# Patient Record
Sex: MALE | Race: BLACK OR AFRICAN AMERICAN | NOT HISPANIC OR LATINO | URBAN - METROPOLITAN AREA
[De-identification: names, ages, dates, MRNs, and addresses within clinical notes are randomized per-mention and may not be internally consistent; named-entity substitution may affect disease eponyms.]

---

## 2014-08-14 RX ORDER — FAMOTIDINE 10 MG/ML
1 INJECTION INTRAVENOUS
Qty: 0 | Refills: 0 | DISCHARGE
Start: 2014-08-14

## 2015-05-05 RX ORDER — ASPIRIN/CALCIUM CARB/MAGNESIUM 324 MG
1 TABLET ORAL
Qty: 0 | Refills: 0 | DISCHARGE
Start: 2015-05-05

## 2017-03-08 ENCOUNTER — EMERGENCY (EMERGENCY)
Facility: HOSPITAL | Age: 72
LOS: 1 days | Discharge: HOME | End: 2017-03-08
Attending: EMERGENCY MEDICINE
Payer: COMMERCIAL

## 2017-03-08 VITALS
RESPIRATION RATE: 16 BRPM | SYSTOLIC BLOOD PRESSURE: 158 MMHG | TEMPERATURE: 98 F | HEIGHT: 76 IN | DIASTOLIC BLOOD PRESSURE: 71 MMHG | HEART RATE: 81 BPM | WEIGHT: 190.04 LBS | OXYGEN SATURATION: 99 %

## 2017-03-08 VITALS
RESPIRATION RATE: 18 BRPM | HEART RATE: 88 BPM | OXYGEN SATURATION: 98 % | DIASTOLIC BLOOD PRESSURE: 87 MMHG | TEMPERATURE: 98 F | SYSTOLIC BLOOD PRESSURE: 162 MMHG

## 2017-03-08 DIAGNOSIS — Z86.711 PERSONAL HISTORY OF PULMONARY EMBOLISM: ICD-10-CM

## 2017-03-08 DIAGNOSIS — J44.9 CHRONIC OBSTRUCTIVE PULMONARY DISEASE, UNSPECIFIED: ICD-10-CM

## 2017-03-08 DIAGNOSIS — N18.6 END STAGE RENAL DISEASE: ICD-10-CM

## 2017-03-08 DIAGNOSIS — K21.0 GASTRO-ESOPHAGEAL REFLUX DISEASE WITH ESOPHAGITIS: ICD-10-CM

## 2017-03-08 DIAGNOSIS — Z87.891 PERSONAL HISTORY OF NICOTINE DEPENDENCE: ICD-10-CM

## 2017-03-08 DIAGNOSIS — E78.5 HYPERLIPIDEMIA, UNSPECIFIED: ICD-10-CM

## 2017-03-08 DIAGNOSIS — I12.0 HYPERTENSIVE CHRONIC KIDNEY DISEASE WITH STAGE 5 CHRONIC KIDNEY DISEASE OR END STAGE RENAL DISEASE: ICD-10-CM

## 2017-03-08 LAB
ALBUMIN SERPL ELPH-MCNC: 3.5 G/DL — SIGNIFICANT CHANGE UP (ref 3.5–5)
ALP SERPL-CCNC: 85 U/L — SIGNIFICANT CHANGE UP (ref 40–120)
ALT FLD-CCNC: 21 U/L DA — SIGNIFICANT CHANGE UP (ref 10–60)
ANION GAP SERPL CALC-SCNC: 7 MMOL/L — SIGNIFICANT CHANGE UP (ref 5–17)
AST SERPL-CCNC: 29 U/L — SIGNIFICANT CHANGE UP (ref 10–40)
BASOPHILS # BLD AUTO: 0 K/UL — SIGNIFICANT CHANGE UP (ref 0–0.2)
BASOPHILS NFR BLD AUTO: 1 % — SIGNIFICANT CHANGE UP (ref 0–2)
BILIRUB SERPL-MCNC: 0.4 MG/DL — SIGNIFICANT CHANGE UP (ref 0.2–1.2)
BUN SERPL-MCNC: 22 MG/DL — HIGH (ref 7–18)
CALCIUM SERPL-MCNC: 8.9 MG/DL — SIGNIFICANT CHANGE UP (ref 8.4–10.5)
CHLORIDE SERPL-SCNC: 101 MMOL/L — SIGNIFICANT CHANGE UP (ref 96–108)
CO2 SERPL-SCNC: 33 MMOL/L — HIGH (ref 22–31)
CREAT SERPL-MCNC: 9.49 MG/DL — HIGH (ref 0.5–1.3)
EOSINOPHIL # BLD AUTO: 0.2 K/UL — SIGNIFICANT CHANGE UP (ref 0–0.5)
EOSINOPHIL NFR BLD AUTO: 3.5 % — SIGNIFICANT CHANGE UP (ref 0–6)
GLUCOSE SERPL-MCNC: 73 MG/DL — SIGNIFICANT CHANGE UP (ref 70–99)
HCT VFR BLD CALC: 35 % — LOW (ref 39–50)
HGB BLD-MCNC: 10.7 G/DL — LOW (ref 13–17)
LIDOCAIN IGE QN: 85 U/L — SIGNIFICANT CHANGE UP (ref 73–393)
LYMPHOCYTES # BLD AUTO: 1.4 K/UL — SIGNIFICANT CHANGE UP (ref 1–3.3)
LYMPHOCYTES # BLD AUTO: 27.8 % — SIGNIFICANT CHANGE UP (ref 13–44)
MCHC RBC-ENTMCNC: 25 PG — LOW (ref 27–34)
MCHC RBC-ENTMCNC: 30.6 GM/DL — LOW (ref 32–36)
MCV RBC AUTO: 81.8 FL — SIGNIFICANT CHANGE UP (ref 80–100)
MONOCYTES # BLD AUTO: 0.8 K/UL — SIGNIFICANT CHANGE UP (ref 0–0.9)
MONOCYTES NFR BLD AUTO: 16.6 % — HIGH (ref 2–14)
NEUTROPHILS # BLD AUTO: 2.5 K/UL — SIGNIFICANT CHANGE UP (ref 1.8–7.4)
NEUTROPHILS NFR BLD AUTO: 51.2 % — SIGNIFICANT CHANGE UP (ref 43–77)
PLATELET # BLD AUTO: 138 K/UL — LOW (ref 150–400)
POTASSIUM SERPL-MCNC: 4.1 MMOL/L — SIGNIFICANT CHANGE UP (ref 3.5–5.3)
POTASSIUM SERPL-SCNC: 4.1 MMOL/L — SIGNIFICANT CHANGE UP (ref 3.5–5.3)
PROT SERPL-MCNC: 7.9 G/DL — SIGNIFICANT CHANGE UP (ref 6–8.3)
RBC # BLD: 4.27 M/UL — SIGNIFICANT CHANGE UP (ref 4.2–5.8)
RBC # FLD: 16.5 % — HIGH (ref 10.3–14.5)
SODIUM SERPL-SCNC: 141 MMOL/L — SIGNIFICANT CHANGE UP (ref 135–145)
WBC # BLD: 4.9 K/UL — SIGNIFICANT CHANGE UP (ref 3.8–10.5)
WBC # FLD AUTO: 4.9 K/UL — SIGNIFICANT CHANGE UP (ref 3.8–10.5)

## 2017-03-08 PROCEDURE — 83690 ASSAY OF LIPASE: CPT

## 2017-03-08 PROCEDURE — 99284 EMERGENCY DEPT VISIT MOD MDM: CPT

## 2017-03-08 PROCEDURE — 74176 CT ABD & PELVIS W/O CONTRAST: CPT

## 2017-03-08 PROCEDURE — 80053 COMPREHEN METABOLIC PANEL: CPT

## 2017-03-08 PROCEDURE — 74176 CT ABD & PELVIS W/O CONTRAST: CPT | Mod: 26

## 2017-03-08 PROCEDURE — 85027 COMPLETE CBC AUTOMATED: CPT

## 2017-03-08 PROCEDURE — 36000 PLACE NEEDLE IN VEIN: CPT

## 2017-03-08 RX ORDER — SODIUM CHLORIDE 9 MG/ML
1000 INJECTION INTRAMUSCULAR; INTRAVENOUS; SUBCUTANEOUS ONCE
Qty: 0 | Refills: 0 | Status: COMPLETED | OUTPATIENT
Start: 2017-03-08 | End: 2017-03-08

## 2017-03-08 RX ADMIN — SODIUM CHLORIDE 1000 MILLILITER(S): 9 INJECTION INTRAMUSCULAR; INTRAVENOUS; SUBCUTANEOUS at 14:52

## 2017-03-08 NOTE — ED ADULT NURSE REASSESSMENT NOTE - NS ED NURSE REASSESS COMMENT FT1
Pt. is a&ox3, stable, and in no acute distress. Pt. is awaiting discharge. Pt. offers no complaints. Pt. is interactive.

## 2017-03-08 NOTE — ED PROVIDER NOTE - ATTENDING CONTRIBUTION TO CARE
I, Sarah Padilla, performed the initial face to face bedside interview with this patient regarding history of present illness, review of symptoms and relevant past medical, social and family history.  I completed an independent physical examination.  I was the initial provider who evaluated this patient. I have signed out the follow up of any pending tests (i.e. labs, radiological studies) to the ACP.  I have communicated the patient’s plan of care and disposition with the ACP.  The history, relevant review of systems, past medical and surgical history, medical decision making, and physical examination was documented by the scribe in my presence and I attest to the accuracy of the documentation.

## 2017-03-08 NOTE — ED PROVIDER NOTE - PMH
Aortic valve disorder    Arterial insufficiency of lower extremity  RLE  AV fistula infection, sequela    BPH (benign prostatic hyperplasia)    COPD (chronic obstructive pulmonary disease)    Diverticulitis of colon    Diverticulitis of colon    Dizziness    Emphysema/COPD    ESRD (end stage renal disease)    History of smoking 2 pac for 25 yrs , quit 7 yrs ago    HLD (hyperlipidemia)    HTN (hypertension)    PE (pulmonary embolism)    Tobacco abuse  Quit in 2005, Smoked 2.5 PPD for 15 years

## 2017-03-08 NOTE — ED ADULT NURSE NOTE - OBJECTIVE STATEMENT
pt a&ox3, here with c/o abdominal pain. pt states epigastric pain when eating with vomiting. denies nausea, fever, chills, diarrhea, constipation. epigastric pain with vomiting x2 weeks only after eating. pt a&ox3, here with c/o abdominal pain. pt states epigastric pain when eating with vomiting. denies nausea, fever, chills, diarrhea, constipation. epigastric pain with vomiting x2 weeks only after eating. pt with right upper arm dialysis shunt, dialysis on tues/thurs/sat.

## 2017-03-08 NOTE — ED PROVIDER NOTE - OBJECTIVE STATEMENT
70 y/o M pt w/ PMHx of COPD, BPH, diverticulitis and ESRD presents to the ED c/o vomiting and diarrhea x2 weeks. Pt states that he has lost 15 pounds because he keeps on vomiting and having diarrhea. Pt denies fever, chills or any other complaints. NKDA.

## 2017-05-15 ENCOUNTER — RESULT REVIEW (OUTPATIENT)
Age: 72
End: 2017-05-15

## 2017-05-16 ENCOUNTER — EMERGENCY (EMERGENCY)
Facility: HOSPITAL | Age: 72
LOS: 1 days | Discharge: HOME | End: 2017-05-16
Attending: EMERGENCY MEDICINE
Payer: COMMERCIAL

## 2017-05-16 VITALS
DIASTOLIC BLOOD PRESSURE: 66 MMHG | OXYGEN SATURATION: 97 % | HEART RATE: 88 BPM | SYSTOLIC BLOOD PRESSURE: 139 MMHG | RESPIRATION RATE: 18 BRPM | HEIGHT: 76 IN | WEIGHT: 190.04 LBS | TEMPERATURE: 99 F

## 2017-05-16 VITALS
RESPIRATION RATE: 17 BRPM | SYSTOLIC BLOOD PRESSURE: 170 MMHG | HEART RATE: 87 BPM | DIASTOLIC BLOOD PRESSURE: 67 MMHG | TEMPERATURE: 98 F | OXYGEN SATURATION: 98 %

## 2017-05-16 DIAGNOSIS — I12.0 HYPERTENSIVE CHRONIC KIDNEY DISEASE WITH STAGE 5 CHRONIC KIDNEY DISEASE OR END STAGE RENAL DISEASE: ICD-10-CM

## 2017-05-16 DIAGNOSIS — N18.6 END STAGE RENAL DISEASE: ICD-10-CM

## 2017-05-16 DIAGNOSIS — R31.9 HEMATURIA, UNSPECIFIED: ICD-10-CM

## 2017-05-16 DIAGNOSIS — R42 DIZZINESS AND GIDDINESS: ICD-10-CM

## 2017-05-16 DIAGNOSIS — Z99.2 DEPENDENCE ON RENAL DIALYSIS: ICD-10-CM

## 2017-05-16 DIAGNOSIS — E78.5 HYPERLIPIDEMIA, UNSPECIFIED: ICD-10-CM

## 2017-05-16 LAB
ALBUMIN SERPL ELPH-MCNC: 3.6 G/DL — SIGNIFICANT CHANGE UP (ref 3.5–5)
ALP SERPL-CCNC: 99 U/L — SIGNIFICANT CHANGE UP (ref 40–120)
ALT FLD-CCNC: 24 U/L DA — SIGNIFICANT CHANGE UP (ref 10–60)
ANION GAP SERPL CALC-SCNC: 7 MMOL/L — SIGNIFICANT CHANGE UP (ref 5–17)
APPEARANCE UR: ABNORMAL
APTT BLD: 31.1 SEC — SIGNIFICANT CHANGE UP (ref 27.5–37.4)
AST SERPL-CCNC: 22 U/L — SIGNIFICANT CHANGE UP (ref 10–40)
BASOPHILS # BLD AUTO: 0.1 K/UL — SIGNIFICANT CHANGE UP (ref 0–0.2)
BASOPHILS NFR BLD AUTO: 1.3 % — SIGNIFICANT CHANGE UP (ref 0–2)
BILIRUB SERPL-MCNC: 0.5 MG/DL — SIGNIFICANT CHANGE UP (ref 0.2–1.2)
BILIRUB UR-MCNC: NEGATIVE — SIGNIFICANT CHANGE UP
BUN SERPL-MCNC: 33 MG/DL — HIGH (ref 7–18)
CALCIUM SERPL-MCNC: 9.6 MG/DL — SIGNIFICANT CHANGE UP (ref 8.4–10.5)
CHLORIDE SERPL-SCNC: 99 MMOL/L — SIGNIFICANT CHANGE UP (ref 96–108)
CO2 SERPL-SCNC: 33 MMOL/L — HIGH (ref 22–31)
COLOR SPEC: ABNORMAL
CREAT SERPL-MCNC: 7.62 MG/DL — HIGH (ref 0.5–1.3)
DIFF PNL FLD: ABNORMAL
EOSINOPHIL # BLD AUTO: 0.3 K/UL — SIGNIFICANT CHANGE UP (ref 0–0.5)
EOSINOPHIL NFR BLD AUTO: 4.7 % — SIGNIFICANT CHANGE UP (ref 0–6)
GLUCOSE SERPL-MCNC: 86 MG/DL — SIGNIFICANT CHANGE UP (ref 70–99)
GLUCOSE UR QL: NEGATIVE — SIGNIFICANT CHANGE UP
HCT VFR BLD CALC: 37.3 % — LOW (ref 39–50)
HGB BLD-MCNC: 11.4 G/DL — LOW (ref 13–17)
INR BLD: 1.01 RATIO — SIGNIFICANT CHANGE UP (ref 0.88–1.16)
KETONES UR-MCNC: NEGATIVE — SIGNIFICANT CHANGE UP
LEUKOCYTE ESTERASE UR-ACNC: NEGATIVE — SIGNIFICANT CHANGE UP
LYMPHOCYTES # BLD AUTO: 1.7 K/UL — SIGNIFICANT CHANGE UP (ref 1–3.3)
LYMPHOCYTES # BLD AUTO: 28.2 % — SIGNIFICANT CHANGE UP (ref 13–44)
MCHC RBC-ENTMCNC: 25.2 PG — LOW (ref 27–34)
MCHC RBC-ENTMCNC: 30.6 GM/DL — LOW (ref 32–36)
MCV RBC AUTO: 82.5 FL — SIGNIFICANT CHANGE UP (ref 80–100)
MONOCYTES # BLD AUTO: 1 K/UL — HIGH (ref 0–0.9)
MONOCYTES NFR BLD AUTO: 16.6 % — HIGH (ref 2–14)
NEUTROPHILS # BLD AUTO: 3 K/UL — SIGNIFICANT CHANGE UP (ref 1.8–7.4)
NEUTROPHILS NFR BLD AUTO: 49.2 % — SIGNIFICANT CHANGE UP (ref 43–77)
NITRITE UR-MCNC: NEGATIVE — SIGNIFICANT CHANGE UP
PH UR: 8 — SIGNIFICANT CHANGE UP (ref 5–8)
PLATELET # BLD AUTO: 98 K/UL — LOW (ref 150–400)
POTASSIUM SERPL-MCNC: 4.4 MMOL/L — SIGNIFICANT CHANGE UP (ref 3.5–5.3)
POTASSIUM SERPL-SCNC: 4.4 MMOL/L — SIGNIFICANT CHANGE UP (ref 3.5–5.3)
PROT SERPL-MCNC: 8.3 G/DL — SIGNIFICANT CHANGE UP (ref 6–8.3)
PROT UR-MCNC: 100
PROTHROM AB SERPL-ACNC: 11 SEC — SIGNIFICANT CHANGE UP (ref 9.8–12.7)
RBC # BLD: 4.52 M/UL — SIGNIFICANT CHANGE UP (ref 4.2–5.8)
RBC # FLD: 15.8 % — HIGH (ref 10.3–14.5)
SODIUM SERPL-SCNC: 139 MMOL/L — SIGNIFICANT CHANGE UP (ref 135–145)
SP GR SPEC: 1.01 — SIGNIFICANT CHANGE UP (ref 1.01–1.02)
UROBILINOGEN FLD QL: NEGATIVE — SIGNIFICANT CHANGE UP
WBC # BLD: 6 K/UL — SIGNIFICANT CHANGE UP (ref 3.8–10.5)
WBC # FLD AUTO: 6 K/UL — SIGNIFICANT CHANGE UP (ref 3.8–10.5)

## 2017-05-16 PROCEDURE — 86901 BLOOD TYPING SEROLOGIC RH(D): CPT

## 2017-05-16 PROCEDURE — 51703 INSERT BLADDER CATH COMPLEX: CPT

## 2017-05-16 PROCEDURE — 86900 BLOOD TYPING SEROLOGIC ABO: CPT

## 2017-05-16 PROCEDURE — 86850 RBC ANTIBODY SCREEN: CPT

## 2017-05-16 PROCEDURE — 85610 PROTHROMBIN TIME: CPT

## 2017-05-16 PROCEDURE — 36000 PLACE NEEDLE IN VEIN: CPT

## 2017-05-16 PROCEDURE — 87086 URINE CULTURE/COLONY COUNT: CPT

## 2017-05-16 PROCEDURE — 81001 URINALYSIS AUTO W/SCOPE: CPT

## 2017-05-16 PROCEDURE — 80053 COMPREHEN METABOLIC PANEL: CPT

## 2017-05-16 PROCEDURE — 99284 EMERGENCY DEPT VISIT MOD MDM: CPT | Mod: 25

## 2017-05-16 PROCEDURE — 85027 COMPLETE CBC AUTOMATED: CPT

## 2017-05-16 PROCEDURE — 85730 THROMBOPLASTIN TIME PARTIAL: CPT

## 2017-05-16 NOTE — ED PROVIDER NOTE - PHYSICAL EXAMINATION
PE: CONSTITUTIONAL: Well appearing, well nourished, in no apparent distress. ENMT: Airway patent, nasal mucosa clear, mouth with normal mucosa. HEAD: NCAT EYES: PERRL, EOMI CARDIAC: RRR, no m/r/g, no pedal edema RESPIRATORY: CTA b/l, no adventitious sounds GI: Abdomen non-distended, non-tender MSK: Spine appears normal, range of motion is not limited, no muscle/joint tenderness NEURO: CNII-XII grossly intact, 5/5 strength, full sensation all extremities, gait intact SKIN: No rash, R AV fistula intact

## 2017-05-16 NOTE — ED PROVIDER NOTE - OBJECTIVE STATEMENT
72m pmhx ESRD on HD tues/thurs/sat (still makes urine), BPH, COPD, HLD, HTN, PE (not on A/C per pt) p/w hematuria. started monday, was seen by PMD who rx'd Abx. pt was able to started first 2 doses today, states voiding more blood now, denies clots or retention. has never occurred in past, not on any A/C. a/w lightheadedness, denies fever/chills, CP/SOB, abdo pain, nausea/vomiting.

## 2017-05-16 NOTE — ED PROVIDER NOTE - PROGRESS NOTE DETAILS
s/p wheatley clamp for ~25-30 min, urine still clear. hgb stable, will d/c home. place name in [facility] for urology f/up.

## 2017-05-16 NOTE — ED ADULT TRIAGE NOTE - CHIEF COMPLAINT QUOTE
BIBA c/o blood in urine x 3 days, pt states he was prescribed cipro for uti even though its not confirmed yet by UA, pt is dialysis pt, had dialysis today

## 2017-05-16 NOTE — ED PROVIDER NOTE - NS ED ROS FT
ROS: GENERAL: no fevers, no chills HEENT: no epistaxis, no eye pain, no ear, no throat pain CARDIAC: no chest pain, no shortness of breath PULM: no cough, no shortness of breath GI: no nausea, no vomiting, no diarrhea,  no abdominal pain, no hematemesis, no bright red blood per rectum : no dysuria, no hematuria EXTREMITIES: no arm pain, no leg pain, no back pain SKIN: no purpura, no petechiae NEURO: no headache, no neck pain, no loss of strength/sensation, +lightheaded HEME: no easy bruising, no easy bleeding

## 2017-05-16 NOTE — ED ADULT NURSE NOTE - OBJECTIVE STATEMENT
pt. came in via ems with c/o blood in the urine pt states that he has blood in his urine x 3 days alreadypt states he is a dialysis pt denies any pain at this time last fialysis was this am

## 2017-05-16 NOTE — ED PROVIDER NOTE - MEDICAL DECISION MAKING DETAILS
72m pmhx ESRD on HD tues/thurs/sat (still makes urine), BPH, COPD, HLD, HTN, PE (not on A/C per pt) p/w hematuria. started monday, was seen by PMD who rx'd Abx. pt was able to started first 2 doses today, states voiding more blood now, denies clots or retention. has never occurred in past, not on any A/C. a/w lightheadedness, denies fever/chills, CP/SOB, abdo pain, nausea/vomiting. on PE, VSS, in no distress, RRR, CTA b/l, abdo soft, non-TTP. will obtain basics, T&S, UA/UCx

## 2017-05-17 ENCOUNTER — EMERGENCY (EMERGENCY)
Facility: HOSPITAL | Age: 72
LOS: 1 days | Discharge: HOME | End: 2017-05-17
Attending: EMERGENCY MEDICINE
Payer: COMMERCIAL

## 2017-05-17 VITALS
DIASTOLIC BLOOD PRESSURE: 59 MMHG | HEART RATE: 94 BPM | TEMPERATURE: 99 F | RESPIRATION RATE: 18 BRPM | OXYGEN SATURATION: 98 % | SYSTOLIC BLOOD PRESSURE: 193 MMHG

## 2017-05-17 VITALS
OXYGEN SATURATION: 98 % | HEART RATE: 106 BPM | DIASTOLIC BLOOD PRESSURE: 68 MMHG | WEIGHT: 190.04 LBS | SYSTOLIC BLOOD PRESSURE: 155 MMHG | HEIGHT: 76 IN | RESPIRATION RATE: 18 BRPM | TEMPERATURE: 98 F

## 2017-05-17 DIAGNOSIS — R31.9 HEMATURIA, UNSPECIFIED: ICD-10-CM

## 2017-05-17 DIAGNOSIS — I12.0 HYPERTENSIVE CHRONIC KIDNEY DISEASE WITH STAGE 5 CHRONIC KIDNEY DISEASE OR END STAGE RENAL DISEASE: ICD-10-CM

## 2017-05-17 DIAGNOSIS — Z98.890 OTHER SPECIFIED POSTPROCEDURAL STATES: ICD-10-CM

## 2017-05-17 DIAGNOSIS — N18.6 END STAGE RENAL DISEASE: ICD-10-CM

## 2017-05-17 DIAGNOSIS — J44.9 CHRONIC OBSTRUCTIVE PULMONARY DISEASE, UNSPECIFIED: ICD-10-CM

## 2017-05-17 DIAGNOSIS — E78.5 HYPERLIPIDEMIA, UNSPECIFIED: ICD-10-CM

## 2017-05-17 DIAGNOSIS — Z87.891 PERSONAL HISTORY OF NICOTINE DEPENDENCE: ICD-10-CM

## 2017-05-17 DIAGNOSIS — N40.0 BENIGN PROSTATIC HYPERPLASIA WITHOUT LOWER URINARY TRACT SYMPTOMS: ICD-10-CM

## 2017-05-17 LAB
HCT VFR BLD CALC: 38.5 % — LOW (ref 39–50)
HGB BLD-MCNC: 11.7 G/DL — LOW (ref 13–17)
MCHC RBC-ENTMCNC: 25.3 PG — LOW (ref 27–34)
MCHC RBC-ENTMCNC: 30.4 GM/DL — LOW (ref 32–36)
MCV RBC AUTO: 83.2 FL — SIGNIFICANT CHANGE UP (ref 80–100)
PLATELET # BLD AUTO: 118 K/UL — LOW (ref 150–400)
RBC # BLD: 4.63 M/UL — SIGNIFICANT CHANGE UP (ref 4.2–5.8)
RBC # FLD: 15.7 % — HIGH (ref 10.3–14.5)
WBC # BLD: 8.6 K/UL — SIGNIFICANT CHANGE UP (ref 3.8–10.5)
WBC # FLD AUTO: 8.6 K/UL — SIGNIFICANT CHANGE UP (ref 3.8–10.5)

## 2017-05-17 PROCEDURE — 99284 EMERGENCY DEPT VISIT MOD MDM: CPT | Mod: 25

## 2017-05-17 PROCEDURE — 72192 CT PELVIS W/O DYE: CPT | Mod: 26

## 2017-05-17 PROCEDURE — 85027 COMPLETE CBC AUTOMATED: CPT

## 2017-05-17 PROCEDURE — 72192 CT PELVIS W/O DYE: CPT

## 2017-05-17 PROCEDURE — 99285 EMERGENCY DEPT VISIT HI MDM: CPT | Mod: 25

## 2017-05-17 PROCEDURE — 96374 THER/PROPH/DIAG INJ IV PUSH: CPT

## 2017-05-17 RX ORDER — MORPHINE SULFATE 50 MG/1
4 CAPSULE, EXTENDED RELEASE ORAL ONCE
Qty: 0 | Refills: 0 | Status: DISCONTINUED | OUTPATIENT
Start: 2017-05-17 | End: 2017-05-17

## 2017-05-17 RX ORDER — HYDRALAZINE HCL 50 MG
25 TABLET ORAL ONCE
Qty: 0 | Refills: 0 | Status: COMPLETED | OUTPATIENT
Start: 2017-05-17 | End: 2017-05-17

## 2017-05-17 RX ADMIN — Medication 25 MILLIGRAM(S): at 16:14

## 2017-05-17 RX ADMIN — MORPHINE SULFATE 4 MILLIGRAM(S): 50 CAPSULE, EXTENDED RELEASE ORAL at 16:10

## 2017-05-17 RX ADMIN — MORPHINE SULFATE 4 MILLIGRAM(S): 50 CAPSULE, EXTENDED RELEASE ORAL at 12:16

## 2017-05-17 NOTE — ED PROVIDER NOTE - DATA REVIEWED, MDM
old records/nurses notes/vital signs
no redness or swelling noted; catheter intact/IV discontinued, cath removed intact

## 2017-05-17 NOTE — ED PROVIDER NOTE - PHYSICAL EXAMINATION
GENERAL: No acute distress, non toxic  HEAD: Atraumatic, normocephalic  EARS: Externally normal, atraumatic, TMs normal bilaterally  EYES: No jaundice, not injected, no rupture, no foreign bodies  MOUTH: Moist mucous membranes, no open lesion, uvula midline without edema, no exudates, no peritonsilar abscess bilaterally.  NECK: Supple, full range of motion, no swelling, no lymphadenopathy  HEART: Regular rate and rhythm, no murmurs, no rubs, no gallops  LUNGS: Clear to auscultation bilaterally without rhonci, rales, or wheezing  ABDOMEN: Soft and non tender in all 4 quadrants, no signs of trauma, no costovertebral tenderness bilaterally, no suprapubic masses  : Marc in place, draining blood in leg bag, no clots  BACK/SPINE: Non tender spine in cervical/thoracic/lumbar regions, no stepoffs palpable  EXTREMITIES: No gross deformities  VASCULAR: Pulses palpable in all extremities, no pitting edema, capillary refill <2 secs  SKIN: Grossly intact without rash or open wounds  PSYCH: Alert and oriented x 3  GAIT: Normal without need for assistance

## 2017-05-17 NOTE — CONSULT NOTE ADULT - SUBJECTIVE AND OBJECTIVE BOX
This is a 71 y/o male with PMHx of ESRD on HD (Tu-Th-Sat), s/p Kidney transplant, BPH, COPD, HLD, HTN & hx of a PE (not on AC) who presented to the ED for hematuria. He was here last night with hematuria that began 2-3 days ago. Today he began to have pain because of the hematuria. The patient normally doesn’t make urine, he states that if he makes any it is a very small amount. SO when he saw blood coming from his penis that was painful he came to the ED. At first a wheatley & CBI was placed and his urine cleared up and he was sent home with the wheatley. However, a few hours later the patient returned to the ED with painful hematuria again so CBI was restarted and the urine cleared up. He remained in the ED most of the day for observation, a CT pelvis was obtained, as was a repeat CBC to check the H&H. Around 1230pm the CBI stopped flowing and he needed to be irrigated once, one large clot was removed and CBI was restarted. It has remained clear since. The CBI was clamped. 1.5 hours later he was rechecked and there is no hematuria, and patient has no pain. Currently the patient is comfortable, he is complaining of some penile pain, denies CP, SOB, fevers, chills, nausea, and vomiting.     PMHx: as above  SurgHx: Colon resection for diverticulitis, renal trasnplant  Social Hx: 15-20 year smoking history of 2.5 packs per day, had not smoked for 10 years  Allergies: No Known Allergies    Vitals: T(F): 98, Max: 98 (05-17 @ 15:17)  HR: 103 (92 - 106)  BP: 202/90 (155/68 - 202/90)  RR: 16 (16 - 18)  SpO2: 99% (98% - 100%)  Wt(kg): --    Labs:                       11.7   8.6   )-----------( 118      ( 17 May 2017 09:56 )             38.5 (37.3)      139  |  99  |  33<H>  ----------------------------<  86  4.4   |  33<H>  |  7.62<H>    Ca    9.6      16 May 2017 21:16    TPro  8.3  /  Alb  3.6  /  TBili  0.5  /  DBili  x   /  AST  22  /  ALT  24  /  AlkPhos  99     CT: No contrast is noted in the blood vessels or collecting system   due to contrast extravasation.  Interval placement of a Wheatley catheter   which terminates in the urinary bladder. Evaluation of the urinary   bladder is limited by lack of excreted IV contrast and due to   underdistention. The prostateis enlarged measuring 6.2 x 7.4 x 7.5 cm.   Stable right lower quadrant transplant kidney. No evidence for a calculus   in the transplant kidney or ureter. Reflux of air in the transplant renal   pelvis and ureter. Mild pelviectasis of the transplantkidney. In the   renal pelvis of the transplant kidney, there is a dependent 1.4 x 0.7 cm   slightly hyperdense structure; this may represent a blood clot or a   mass/cancer.    Physical Exam  General: AAO x 3, no acute distress  Abdomen: soft, nontender, nondistended, no rebound, no guarding, no tympani, mild suprapubic tenderness  Back:   :  Wheatley: This is a 73 y/o male with PMHx of ESRD on HD (Tu-Th-Sat), s/p Kidney transplant, BPH, COPD, HLD, HTN & hx of a PE (not on AC) who presented to the ED for hematuria. He was here last night with hematuria that began 2-3 days ago. Today he began to have pain because of the hematuria. The patient normally doesn’t make urine, he states that if he makes any it is a very small amount. SO when he saw blood coming from his penis that was painful he came to the ED. At first a wheatley & CBI was placed and his urine cleared up and he was sent home with the wheatley. However, a few hours later the patient returned to the ED with painful hematuria again so CBI was restarted and the urine cleared up. He remained in the ED most of the day for observation, a CT pelvis was obtained, as was a repeat CBC to check the H&H. Around 1230pm the CBI stopped flowing and he needed to be irrigated once, one large clot was removed and CBI was restarted. It has remained clear since. The CBI was clamped. 1.5 hours later he was rechecked and there is no hematuria, and patient has no pain. Currently the patient is comfortable, he is complaining of some penile pain, denies CP, SOB, fevers, chills, nausea, and vomiting.     PMHx: as above  SurgHx: Colon resection for diverticulitis, renal trasnplant  Social Hx: 15-20 year smoking history of 2.5 packs per day, had not smoked for 10 years  Allergies: No Known Allergies    Vitals: T(F): 98, Max: 98 (05-17 @ 15:17)  HR: 103 (92 - 106)  BP: 202/90 (155/68 - 202/90)  RR: 16 (16 - 18)  SpO2: 99% (98% - 100%)  Wt(kg): --    Labs:                       11.7   8.6   )-----------( 118      ( 17 May 2017 09:56 )             38.5 (37.3)      139  |  99  |  33<H>  ----------------------------<  86  4.4   |  33<H>  |  7.62<H>    Ca    9.6      16 May 2017 21:16    TPro  8.3  /  Alb  3.6  /  TBili  0.5  /  DBili  x   /  AST  22  /  ALT  24  /  AlkPhos  99     CT: No contrast is noted in the blood vessels or collecting system   due to contrast extravasation.  Interval placement of a Wheatley catheter   which terminates in the urinary bladder. Evaluation of the urinary   bladder is limited by lack of excreted IV contrast and due to   underdistention. The prostateis enlarged measuring 6.2 x 7.4 x 7.5 cm.   Stable right lower quadrant transplant kidney. No evidence for a calculus   in the transplant kidney or ureter. Reflux of air in the transplant renal   pelvis and ureter. Mild pelviectasis of the transplantkidney. In the   renal pelvis of the transplant kidney, there is a dependent 1.4 x 0.7 cm   slightly hyperdense structure; this may represent a blood clot or a   mass/cancer.    Physical Exam  General: AAO x 3, no acute distress  Abdomen: soft, nontender, nondistended, no rebound, no guarding, no tympani, mild suprapubic tenderness  Back: no CVA tenderness bilaterally  : normal external genitalia, no penile or scrotal edema, both testicles are non tender, penis is tender but likely due to placement of wheatley catheter.  Wheatley: in place, CBI is clamped and wheatley is clear

## 2017-05-17 NOTE — CONSULT NOTE ADULT - ATTENDING COMMENTS
urine now clear   will d/c home and arrange for cystoscopy in office next week  return for continued hematuria

## 2017-05-17 NOTE — ED PROVIDER NOTE - OBJECTIVE STATEMENT
72m pmhx ESRD on HD T/Thu/Sat (still makes urine), BPH, COPD, HLD, HTN, PE (not on A/C per pt) p/w hematuria. started monday, was seen by PMD who rx'd Abx. pt was able to started first 2 doses today, states voiding more blood now, denies clots or retention. Seen earlier tonight with same symptoms but cleared with CBI and no pain. Sent home and now returns as having pain in suprapubic area and blood in wheatley. Not on AC. No trauma, could not sleep so came to ED for treatment. Still noticed draining of blood in urine and wheatley. No chest pain, palpitations, dizziness, paresthesias.

## 2017-05-17 NOTE — ED PROVIDER NOTE - MEDICAL DECISION MAKING DETAILS
71 yo M with multiple medical problems that was recently discharged from [facility] ED for hematuria and placed Marc and leg bag returns with hematuria and pain. Labs reviewed from recent ED visit and H/H stable and CBI showed urine cleared with CBI. Will CBI again and provide pain meds and reassess.

## 2017-05-17 NOTE — CONSULT NOTE ADULT - PROBLEM SELECTOR RECOMMENDATION 9
1. d/c wheatley catheter  2. d/c home  3. Patient will follow up with Dr. Lamas in the office as an outpatient.  4. Instructed to return to the ED if hematuria continues

## 2017-05-17 NOTE — ED PROVIDER NOTE - PSH
h/o  LUE A-V fistula    h/o AV fistula - left upper arm    H/O unilateral nephrectomy  9/2013  History of colon resection - April 2012    S/P kidney transplant  09/2013 in [place]

## 2017-05-17 NOTE — ED ADULT TRIAGE NOTE - CHIEF COMPLAINT QUOTE
pain catheter wheatley cath site,blood in the urine,was discharged last night in ed with wheatley catheter,dialysis pt last dialysis yesterday

## 2017-05-17 NOTE — ED ADULT NURSE NOTE - OBJECTIVE STATEMENT
Patient presents with wheatley catheter to leg bag. Bloody urine since yesterday. Last HD yesterday.

## 2017-05-17 NOTE — ED ADULT NURSE REASSESSMENT NOTE - NS ED NURSE REASSESS COMMENT FT1
1204 - pt hematuria resolved with bladder irrigation , pt tolerated procedure well denies any pain now. clear output noted pt stable no distress dc and cleared home by PMD. pt will go home by ambulette with v/s stable

## 2017-05-18 LAB
CULTURE RESULTS: SIGNIFICANT CHANGE UP
SPECIMEN SOURCE: SIGNIFICANT CHANGE UP

## 2017-05-24 ENCOUNTER — RESULT REVIEW (OUTPATIENT)
Age: 72
End: 2017-05-24

## 2017-07-26 ENCOUNTER — OUTPATIENT (OUTPATIENT)
Dept: OUTPATIENT SERVICES | Facility: HOSPITAL | Age: 72
LOS: 1 days | End: 2017-07-26
Payer: COMMERCIAL

## 2017-07-26 VITALS
HEART RATE: 76 BPM | OXYGEN SATURATION: 98 % | TEMPERATURE: 98 F | DIASTOLIC BLOOD PRESSURE: 68 MMHG | WEIGHT: 179.9 LBS | SYSTOLIC BLOOD PRESSURE: 151 MMHG | HEIGHT: 76 IN | RESPIRATION RATE: 16 BRPM

## 2017-07-26 DIAGNOSIS — Z01.818 ENCOUNTER FOR OTHER PREPROCEDURAL EXAMINATION: ICD-10-CM

## 2017-07-26 DIAGNOSIS — N40.0 BENIGN PROSTATIC HYPERPLASIA WITHOUT LOWER URINARY TRACT SYMPTOMS: ICD-10-CM

## 2017-07-26 PROCEDURE — G0463: CPT

## 2017-07-26 NOTE — H&P PST ADULT - PSH
h/o  LUE A-V fistula    h/o AV fistula - left upper arm    H/O unilateral nephrectomy  9/2013  History of colon resection - April 2012    S/P kidney transplant  09/2013 in [place]

## 2017-07-26 NOTE — H&P PST ADULT - NEGATIVE CARDIOVASCULAR SYMPTOMS
no orthopnea/no peripheral edema/no chest pain/no claudication/no paroxysmal nocturnal dyspnea/no palpitations

## 2017-07-26 NOTE — H&P PST ADULT - NEGATIVE GENERAL SYMPTOMS
no polyphagia/no sweating/no polyuria/no malaise/no chills/no anorexia/no weight gain/no polydipsia/no weight loss/no fever

## 2017-07-26 NOTE — H&P PST ADULT - NSANTHOSAYNRD_GEN_A_CORE
No. LEONIE screening performed.  STOP BANG Legend: 0-2 = LOW Risk; 3-4 = INTERMEDIATE Risk; 5-8 = HIGH Risk

## 2017-07-26 NOTE — H&P PST ADULT - PMH
Aortic valve disorder    Arterial insufficiency of lower extremity  RLE  AV fistula infection, sequela    BPH (benign prostatic hyperplasia)    COPD (chronic obstructive pulmonary disease)    Diverticulitis of colon    Diverticulitis of colon    Dizziness    Emphysema/COPD    Enlarged prostate without lower urinary tract symptoms (luts)    ESRD (end stage renal disease)    History of smoking 2 pac for 25 yrs , quit 7 yrs ago    HLD (hyperlipidemia)    HTN (hypertension)    PE (pulmonary embolism)    Tobacco abuse  Quit in 2005, Smoked 2.5 PPD for 15 years

## 2017-07-26 NOTE — H&P PST ADULT - HISTORY OF PRESENT ILLNESS
71 y/o AA male with PMH of hypertension, HLD, COPD, CKD, GERD, ESRD on hemodialysis (2005), s/p kidney transplant failure (2014) is here for presurgical evaluation prior to surgery. He was diagnosed with enlarged prostate without LUTS and is scheduled for cystoscopy, bladder biopsy with bilateral retrograde pyelogram on 7/31/2017 73 y/o AA male with PMH of hypertension, PE (2013, off coumadin), HLD, COPD, CKD, GERD, s/p kidney transplant failure (2014), ESRD on hemodialysis (2005) is here for presurgical evaluation prior to surgery. He was diagnosed with enlarged prostate without LUTS and is scheduled for cystoscopy, bladder biopsy with bilateral retrograde pyelogram on 7/31/2017

## 2017-07-26 NOTE — H&P PST ADULT - ASSESSMENT
71 y/o AA male with PMH of hypertension, HLD, CKD, GERD, COPD, ESRD on hemodialysis (2005), s/p kidney transplant failure (2014) is here for presurgical evaluation prior to surgery. He was diagnosed with enlarged prostate without LUTS and is scheduled for cystoscopy, bladder biopsy with bilateral retrograde pyelogram on 7/31/2017 71 y/o AA male with PMH of hypertension, PE (2013), HLD, CKD, GERD, COPD, s/p kidney transplant failure (2014), ESRD on hemodialysis (2005), diagnosed with enlarged prostate without LUTS and scheduled for cystoscopy, bladder biopsy with bilateral retrograde pyelogram on 7/31/2017. Patient is at moderate risk for planned surgery

## 2017-07-26 NOTE — H&P PST ADULT - PROBLEM SELECTOR PLAN 1
Scheduled for cystoscopy, bladder biopsy with bilateral retrograde pyelogram on 7/31/2017. Preoperative instructions discussed and given to patient. Verbalized understanding of same

## 2017-07-26 NOTE — H&P PST ADULT - LAST ECHOCARDIOGRAM
12/21/2015: LVEF 62%, grade 1 diastolic dysfunction, mild LA enlargement, mild aortic, mild mitral, mild tricuspid and pulmonic regurg, mild pulmonary hypertension - PA systolic pressure 34mmHg

## 2017-07-26 NOTE — H&P PST ADULT - RS GEN PE MLT RESP DETAILS PC
normal/no intercostal retractions/respirations non-labored/good air movement/no rales/airway patent/no wheezes/no chest wall tenderness/clear to auscultation bilaterally/no rhonchi/no subcutaneous emphysema

## 2017-07-28 DIAGNOSIS — N40.0 BENIGN PROSTATIC HYPERPLASIA WITHOUT LOWER URINARY TRACT SYMPTOMS: ICD-10-CM

## 2017-07-28 RX ORDER — SODIUM CHLORIDE 9 MG/ML
3 INJECTION INTRAMUSCULAR; INTRAVENOUS; SUBCUTANEOUS EVERY 8 HOURS
Qty: 0 | Refills: 0 | Status: DISCONTINUED | OUTPATIENT
Start: 2017-07-31 | End: 2017-08-08

## 2017-07-31 ENCOUNTER — RESULT REVIEW (OUTPATIENT)
Age: 72
End: 2017-07-31

## 2017-07-31 ENCOUNTER — TRANSCRIPTION ENCOUNTER (OUTPATIENT)
Age: 72
End: 2017-07-31

## 2017-07-31 ENCOUNTER — OUTPATIENT (OUTPATIENT)
Dept: OUTPATIENT SERVICES | Facility: HOSPITAL | Age: 72
LOS: 1 days | Discharge: HOME | End: 2017-07-31
Payer: COMMERCIAL

## 2017-07-31 VITALS
WEIGHT: 179.9 LBS | RESPIRATION RATE: 16 BRPM | SYSTOLIC BLOOD PRESSURE: 151 MMHG | TEMPERATURE: 98 F | HEIGHT: 76 IN | OXYGEN SATURATION: 100 % | HEART RATE: 85 BPM | DIASTOLIC BLOOD PRESSURE: 68 MMHG

## 2017-07-31 VITALS
TEMPERATURE: 97 F | DIASTOLIC BLOOD PRESSURE: 55 MMHG | HEART RATE: 91 BPM | OXYGEN SATURATION: 98 % | SYSTOLIC BLOOD PRESSURE: 156 MMHG | RESPIRATION RATE: 20 BRPM

## 2017-07-31 DIAGNOSIS — N40.0 BENIGN PROSTATIC HYPERPLASIA WITHOUT LOWER URINARY TRACT SYMPTOMS: ICD-10-CM

## 2017-07-31 DIAGNOSIS — Z01.818 ENCOUNTER FOR OTHER PREPROCEDURAL EXAMINATION: ICD-10-CM

## 2017-07-31 LAB
ANION GAP SERPL CALC-SCNC: 8 MMOL/L — SIGNIFICANT CHANGE UP (ref 5–17)
BUN SERPL-MCNC: 66 MG/DL — HIGH (ref 7–18)
CALCIUM SERPL-MCNC: 9.6 MG/DL — SIGNIFICANT CHANGE UP (ref 8.4–10.5)
CHLORIDE SERPL-SCNC: 101 MMOL/L — SIGNIFICANT CHANGE UP (ref 96–108)
CO2 SERPL-SCNC: 30 MMOL/L — SIGNIFICANT CHANGE UP (ref 22–31)
CREAT SERPL-MCNC: 10.4 MG/DL — HIGH (ref 0.5–1.3)
GLUCOSE SERPL-MCNC: 105 MG/DL — HIGH (ref 70–99)
POTASSIUM SERPL-MCNC: 5.1 MMOL/L — SIGNIFICANT CHANGE UP (ref 3.5–5.3)
POTASSIUM SERPL-SCNC: 5.1 MMOL/L — SIGNIFICANT CHANGE UP (ref 3.5–5.3)
SODIUM SERPL-SCNC: 139 MMOL/L — SIGNIFICANT CHANGE UP (ref 135–145)

## 2017-07-31 PROCEDURE — 36415 COLL VENOUS BLD VENIPUNCTURE: CPT

## 2017-07-31 PROCEDURE — 88305 TISSUE EXAM BY PATHOLOGIST: CPT | Mod: 26

## 2017-07-31 PROCEDURE — 80048 BASIC METABOLIC PNL TOTAL CA: CPT

## 2017-07-31 PROCEDURE — 88305 TISSUE EXAM BY PATHOLOGIST: CPT

## 2017-07-31 PROCEDURE — 76000 FLUOROSCOPY <1 HR PHYS/QHP: CPT

## 2017-07-31 PROCEDURE — 52204 CYSTOSCOPY W/BIOPSY(S): CPT

## 2017-07-31 RX ORDER — MOXIFLOXACIN HYDROCHLORIDE TABLETS, 400 MG 400 MG/1
1 TABLET, FILM COATED ORAL
Qty: 6 | Refills: 0 | OUTPATIENT
Start: 2017-07-31 | End: 2017-08-03

## 2017-07-31 RX ORDER — SODIUM CHLORIDE 9 MG/ML
1000 INJECTION INTRAMUSCULAR; INTRAVENOUS; SUBCUTANEOUS
Qty: 0 | Refills: 0 | Status: DISCONTINUED | OUTPATIENT
Start: 2017-07-31 | End: 2017-07-31

## 2017-07-31 RX ORDER — OXYCODONE AND ACETAMINOPHEN 5; 325 MG/1; MG/1
1 TABLET ORAL EVERY 6 HOURS
Qty: 0 | Refills: 0 | Status: DISCONTINUED | OUTPATIENT
Start: 2017-07-31 | End: 2017-07-31

## 2017-07-31 RX ORDER — HYDRALAZINE HCL 50 MG
10 TABLET ORAL ONCE
Qty: 0 | Refills: 0 | Status: DISCONTINUED | OUTPATIENT
Start: 2017-07-31 | End: 2017-07-31

## 2017-07-31 RX ORDER — OXYCODONE HYDROCHLORIDE 5 MG/1
1 TABLET ORAL
Qty: 12 | Refills: 0
Start: 2017-07-31 | End: 2017-08-03

## 2017-07-31 RX ORDER — MORPHINE SULFATE 50 MG/1
2 CAPSULE, EXTENDED RELEASE ORAL
Qty: 0 | Refills: 0 | Status: DISCONTINUED | OUTPATIENT
Start: 2017-07-31 | End: 2017-07-31

## 2017-07-31 RX ADMIN — MORPHINE SULFATE 2 MILLIGRAM(S): 50 CAPSULE, EXTENDED RELEASE ORAL at 09:30

## 2017-07-31 RX ADMIN — SODIUM CHLORIDE 3 MILLILITER(S): 9 INJECTION INTRAMUSCULAR; INTRAVENOUS; SUBCUTANEOUS at 06:54

## 2017-07-31 NOTE — ASU DISCHARGE PLAN (ADULT/PEDIATRIC). - MEDICATION SUMMARY - MEDICATIONS TO TAKE
I will START or STAY ON the medications listed below when I get home from the hospital:    aspirin 81 mg oral tablet, chewable  -- 1 tab(s) by mouth once a day  -- Indication: For as directed     acetaminophen-oxycodone 325 mg-5 mg oral tablet  -- 1 tab(s) by mouth every 6 hours, As needed, Moderate Pain (4 - 6) MDD:4  -- Indication: For pain     Symbicort 160 mcg-4.5 mcg/inh inhalation aerosol  -- 2 puff(s) inhaled 2 times a day  -- Indication: For as directed     famotidine 20 mg oral tablet  -- 1 tab(s) by mouth once a day  -- Indication: For as directed     docusate sodium 100 mg oral capsule  -- 1 cap(s) by mouth 2 times a day, As needed, Constipation  -- Indication: For as directed     Sensipar 30 mg oral tablet  -- 1 tab(s) by mouth once a day  -- Indication: For as directed     Cipro 500 mg oral tablet  -- 1 tab(s) by mouth every 12 hours  -- Avoid prolonged or excessive exposure to direct and/or artificial sunlight while taking this medication.  Check with your doctor before becoming pregnant.  Do not take dairy products, antacids, or iron preparations within one hour of this medication.  Finish all this medication unless otherwise directed by prescriber.  Medication should be taken with plenty of water.    -- Indication: For infection ppx

## 2017-07-31 NOTE — ASU PATIENT PROFILE, ADULT - PSH
h/o  LUE A-V fistula    h/o AV fistula - left upper arm    H/O unilateral nephrectomy  9/2013  History of colon resection - April 2012    S/P kidney transplant  09/2013 in [place]

## 2017-08-02 DIAGNOSIS — I26.99 OTHER PULMONARY EMBOLISM WITHOUT ACUTE COR PULMONALE: ICD-10-CM

## 2017-08-02 DIAGNOSIS — R31.9 HEMATURIA, UNSPECIFIED: ICD-10-CM

## 2017-08-02 DIAGNOSIS — N40.0 BENIGN PROSTATIC HYPERPLASIA WITHOUT LOWER URINARY TRACT SYMPTOMS: ICD-10-CM

## 2017-08-02 DIAGNOSIS — I77.1 STRICTURE OF ARTERY: ICD-10-CM

## 2017-08-02 DIAGNOSIS — J44.9 CHRONIC OBSTRUCTIVE PULMONARY DISEASE, UNSPECIFIED: ICD-10-CM

## 2017-08-02 DIAGNOSIS — Z94.0 KIDNEY TRANSPLANT STATUS: ICD-10-CM

## 2017-08-02 DIAGNOSIS — E78.5 HYPERLIPIDEMIA, UNSPECIFIED: ICD-10-CM

## 2017-08-02 DIAGNOSIS — Z79.82 LONG TERM (CURRENT) USE OF ASPIRIN: ICD-10-CM

## 2017-08-02 DIAGNOSIS — I10 ESSENTIAL (PRIMARY) HYPERTENSION: ICD-10-CM

## 2017-08-02 DIAGNOSIS — Z87.891 PERSONAL HISTORY OF NICOTINE DEPENDENCE: ICD-10-CM

## 2017-08-02 DIAGNOSIS — K21.9 GASTRO-ESOPHAGEAL REFLUX DISEASE WITHOUT ESOPHAGITIS: ICD-10-CM

## 2018-10-05 PROBLEM — N40.0 BENIGN PROSTATIC HYPERPLASIA WITHOUT LOWER URINARY TRACT SYMPTOMS: Chronic | Status: ACTIVE | Noted: 2017-07-26

## 2018-10-17 ENCOUNTER — APPOINTMENT (OUTPATIENT)
Dept: ORTHOPEDIC SURGERY | Facility: CLINIC | Age: 73
End: 2018-10-17
Payer: MEDICARE

## 2018-10-17 VITALS
HEIGHT: 76 IN | BODY MASS INDEX: 21.92 KG/M2 | WEIGHT: 180 LBS | SYSTOLIC BLOOD PRESSURE: 201 MMHG | DIASTOLIC BLOOD PRESSURE: 80 MMHG | HEART RATE: 114 BPM

## 2018-10-17 DIAGNOSIS — F17.200 NICOTINE DEPENDENCE, UNSPECIFIED, UNCOMPLICATED: ICD-10-CM

## 2018-10-17 DIAGNOSIS — M75.101 UNSPECIFIED ROTATOR CUFF TEAR OR RUPTURE OF RIGHT SHOULDER, NOT SPECIFIED AS TRAUMATIC: ICD-10-CM

## 2018-10-17 DIAGNOSIS — M75.102 UNSPECIFIED ROTATOR CUFF TEAR OR RUPTURE OF LEFT SHOULDER, NOT SPECIFIED AS TRAUMATIC: ICD-10-CM

## 2018-10-17 DIAGNOSIS — Z78.9 OTHER SPECIFIED HEALTH STATUS: ICD-10-CM

## 2018-10-17 PROCEDURE — 99203 OFFICE O/P NEW LOW 30 MIN: CPT

## 2018-10-17 PROCEDURE — 73030 X-RAY EXAM OF SHOULDER: CPT | Mod: RT

## 2018-10-22 ENCOUNTER — INPATIENT (INPATIENT)
Facility: HOSPITAL | Age: 73
LOS: 2 days | Discharge: HOME | End: 2018-10-25
Attending: INTERNAL MEDICINE | Admitting: INTERNAL MEDICINE
Payer: COMMERCIAL

## 2018-10-22 VITALS
WEIGHT: 179.9 LBS | DIASTOLIC BLOOD PRESSURE: 103 MMHG | HEART RATE: 98 BPM | OXYGEN SATURATION: 100 % | TEMPERATURE: 97 F | HEIGHT: 76 IN | SYSTOLIC BLOOD PRESSURE: 220 MMHG | RESPIRATION RATE: 18 BRPM

## 2018-10-22 DIAGNOSIS — N18.6 END STAGE RENAL DISEASE: ICD-10-CM

## 2018-10-22 DIAGNOSIS — I10 ESSENTIAL (PRIMARY) HYPERTENSION: ICD-10-CM

## 2018-10-22 DIAGNOSIS — J44.1 CHRONIC OBSTRUCTIVE PULMONARY DISEASE WITH (ACUTE) EXACERBATION: ICD-10-CM

## 2018-10-22 LAB
ALBUMIN SERPL ELPH-MCNC: 3.3 G/DL — SIGNIFICANT CHANGE UP (ref 3.3–5)
ALP SERPL-CCNC: 72 U/L — SIGNIFICANT CHANGE UP (ref 40–120)
ALT FLD-CCNC: 8 U/L — LOW (ref 12–78)
ANION GAP SERPL CALC-SCNC: 11 MMOL/L — SIGNIFICANT CHANGE UP (ref 5–17)
APTT BLD: 32.2 SEC — SIGNIFICANT CHANGE UP (ref 27.5–37.4)
AST SERPL-CCNC: 8 U/L — LOW (ref 15–37)
BILIRUB SERPL-MCNC: 0.5 MG/DL — SIGNIFICANT CHANGE UP (ref 0.2–1.2)
BUN SERPL-MCNC: 39 MG/DL — HIGH (ref 7–23)
CALCIUM SERPL-MCNC: 8.9 MG/DL — SIGNIFICANT CHANGE UP (ref 8.5–10.1)
CHLORIDE SERPL-SCNC: 101 MMOL/L — SIGNIFICANT CHANGE UP (ref 96–108)
CK SERPL-CCNC: 53 U/L — SIGNIFICANT CHANGE UP (ref 26–308)
CO2 SERPL-SCNC: 29 MMOL/L — SIGNIFICANT CHANGE UP (ref 22–31)
CREAT SERPL-MCNC: 9.3 MG/DL — HIGH (ref 0.5–1.3)
GLUCOSE SERPL-MCNC: 92 MG/DL — SIGNIFICANT CHANGE UP (ref 70–99)
HCT VFR BLD CALC: 36.7 % — LOW (ref 39–50)
HGB BLD-MCNC: 11 G/DL — LOW (ref 13–17)
INR BLD: 1.04 RATIO — SIGNIFICANT CHANGE UP (ref 0.88–1.16)
MCHC RBC-ENTMCNC: 24.2 PG — LOW (ref 27–34)
MCHC RBC-ENTMCNC: 30 GM/DL — LOW (ref 32–36)
MCV RBC AUTO: 80.8 FL — SIGNIFICANT CHANGE UP (ref 80–100)
NRBC # BLD: 0 /100 WBCS — SIGNIFICANT CHANGE UP (ref 0–0)
NT-PROBNP SERPL-SCNC: HIGH PG/ML (ref 0–125)
PLATELET # BLD AUTO: 113 K/UL — LOW (ref 150–400)
POTASSIUM SERPL-MCNC: 4 MMOL/L — SIGNIFICANT CHANGE UP (ref 3.5–5.3)
POTASSIUM SERPL-SCNC: 4 MMOL/L — SIGNIFICANT CHANGE UP (ref 3.5–5.3)
PROT SERPL-MCNC: 7.7 GM/DL — SIGNIFICANT CHANGE UP (ref 6–8.3)
PROTHROM AB SERPL-ACNC: 11.4 SEC — SIGNIFICANT CHANGE UP (ref 9.8–12.7)
RBC # BLD: 4.54 M/UL — SIGNIFICANT CHANGE UP (ref 4.2–5.8)
RBC # FLD: 16.4 % — HIGH (ref 10.3–14.5)
SODIUM SERPL-SCNC: 141 MMOL/L — SIGNIFICANT CHANGE UP (ref 135–145)
TROPONIN I SERPL-MCNC: 0.03 NG/ML — SIGNIFICANT CHANGE UP (ref 0.01–0.04)
WBC # BLD: 7.02 K/UL — SIGNIFICANT CHANGE UP (ref 3.8–10.5)
WBC # FLD AUTO: 7.02 K/UL — SIGNIFICANT CHANGE UP (ref 3.8–10.5)

## 2018-10-22 PROCEDURE — 99285 EMERGENCY DEPT VISIT HI MDM: CPT | Mod: 25

## 2018-10-22 PROCEDURE — 93010 ELECTROCARDIOGRAM REPORT: CPT

## 2018-10-22 PROCEDURE — 71275 CT ANGIOGRAPHY CHEST: CPT | Mod: 26

## 2018-10-22 PROCEDURE — 71045 X-RAY EXAM CHEST 1 VIEW: CPT | Mod: 26

## 2018-10-22 PROCEDURE — 99223 1ST HOSP IP/OBS HIGH 75: CPT

## 2018-10-22 RX ORDER — POLYETHYLENE GLYCOL 3350 17 G/17G
17 POWDER, FOR SOLUTION ORAL DAILY
Qty: 0 | Refills: 0 | Status: DISCONTINUED | OUTPATIENT
Start: 2018-10-22 | End: 2018-10-25

## 2018-10-22 RX ORDER — IPRATROPIUM/ALBUTEROL SULFATE 18-103MCG
3 AEROSOL WITH ADAPTER (GRAM) INHALATION EVERY 6 HOURS
Qty: 0 | Refills: 0 | Status: DISCONTINUED | OUTPATIENT
Start: 2018-10-22 | End: 2018-10-25

## 2018-10-22 RX ORDER — CINACALCET 30 MG/1
30 TABLET, FILM COATED ORAL DAILY
Qty: 0 | Refills: 0 | Status: DISCONTINUED | OUTPATIENT
Start: 2018-10-22 | End: 2018-10-25

## 2018-10-22 RX ORDER — FAMOTIDINE 10 MG/ML
20 INJECTION INTRAVENOUS
Qty: 0 | Refills: 0 | Status: DISCONTINUED | OUTPATIENT
Start: 2018-10-22 | End: 2018-10-25

## 2018-10-22 RX ORDER — DOCUSATE SODIUM 100 MG
100 CAPSULE ORAL
Qty: 0 | Refills: 0 | Status: DISCONTINUED | OUTPATIENT
Start: 2018-10-22 | End: 2018-10-22

## 2018-10-22 RX ORDER — AZITHROMYCIN 500 MG/1
50 TABLET, FILM COATED ORAL ONCE
Qty: 0 | Refills: 0 | Status: DISCONTINUED | OUTPATIENT
Start: 2018-10-22 | End: 2018-10-22

## 2018-10-22 RX ORDER — FAMOTIDINE 10 MG/ML
20 INJECTION INTRAVENOUS DAILY
Qty: 0 | Refills: 0 | Status: DISCONTINUED | OUTPATIENT
Start: 2018-10-22 | End: 2018-10-22

## 2018-10-22 RX ORDER — AZITHROMYCIN 500 MG/1
500 TABLET, FILM COATED ORAL ONCE
Qty: 0 | Refills: 0 | Status: COMPLETED | OUTPATIENT
Start: 2018-10-22 | End: 2018-10-22

## 2018-10-22 RX ORDER — IPRATROPIUM/ALBUTEROL SULFATE 18-103MCG
3 AEROSOL WITH ADAPTER (GRAM) INHALATION ONCE
Qty: 0 | Refills: 0 | Status: COMPLETED | OUTPATIENT
Start: 2018-10-22 | End: 2018-10-22

## 2018-10-22 RX ORDER — OXYCODONE AND ACETAMINOPHEN 5; 325 MG/1; MG/1
1 TABLET ORAL EVERY 6 HOURS
Qty: 0 | Refills: 0 | Status: DISCONTINUED | OUTPATIENT
Start: 2018-10-22 | End: 2018-10-25

## 2018-10-22 RX ORDER — DOCUSATE SODIUM 100 MG
100 CAPSULE ORAL
Qty: 0 | Refills: 0 | Status: DISCONTINUED | OUTPATIENT
Start: 2018-10-22 | End: 2018-10-25

## 2018-10-22 RX ORDER — ALBUTEROL 90 UG/1
2.5 AEROSOL, METERED ORAL ONCE
Qty: 0 | Refills: 0 | Status: COMPLETED | OUTPATIENT
Start: 2018-10-22 | End: 2018-10-22

## 2018-10-22 RX ORDER — ASPIRIN/CALCIUM CARB/MAGNESIUM 324 MG
81 TABLET ORAL DAILY
Qty: 0 | Refills: 0 | Status: DISCONTINUED | OUTPATIENT
Start: 2018-10-22 | End: 2018-10-25

## 2018-10-22 RX ORDER — CEFTRIAXONE 500 MG/1
2 INJECTION, POWDER, FOR SOLUTION INTRAMUSCULAR; INTRAVENOUS ONCE
Qty: 0 | Refills: 0 | Status: COMPLETED | OUTPATIENT
Start: 2018-10-22 | End: 2018-10-22

## 2018-10-22 RX ADMIN — Medication 40 MILLIGRAM(S): at 17:25

## 2018-10-22 RX ADMIN — CINACALCET 30 MILLIGRAM(S): 30 TABLET, FILM COATED ORAL at 17:02

## 2018-10-22 RX ADMIN — Medication 0.1 MILLIGRAM(S): at 17:25

## 2018-10-22 RX ADMIN — Medication 3 MILLILITER(S): at 11:00

## 2018-10-22 RX ADMIN — FAMOTIDINE 20 MILLIGRAM(S): 10 INJECTION INTRAVENOUS at 14:32

## 2018-10-22 RX ADMIN — Medication 40 MILLIGRAM(S): at 23:57

## 2018-10-22 RX ADMIN — Medication 125 MILLIGRAM(S): at 10:41

## 2018-10-22 RX ADMIN — OXYCODONE AND ACETAMINOPHEN 1 TABLET(S): 5; 325 TABLET ORAL at 16:57

## 2018-10-22 RX ADMIN — AZITHROMYCIN 255 MILLIGRAM(S): 500 TABLET, FILM COATED ORAL at 10:46

## 2018-10-22 RX ADMIN — Medication 3 MILLILITER(S): at 17:35

## 2018-10-22 RX ADMIN — Medication 100 MILLIGRAM(S): at 17:26

## 2018-10-22 RX ADMIN — CEFTRIAXONE 100 GRAM(S): 500 INJECTION, POWDER, FOR SOLUTION INTRAMUSCULAR; INTRAVENOUS at 14:31

## 2018-10-22 RX ADMIN — Medication 3 MILLILITER(S): at 11:17

## 2018-10-22 RX ADMIN — ALBUTEROL 2.5 MILLIGRAM(S): 90 AEROSOL, METERED ORAL at 10:42

## 2018-10-22 RX ADMIN — Medication 81 MILLIGRAM(S): at 14:37

## 2018-10-22 RX ADMIN — OXYCODONE AND ACETAMINOPHEN 1 TABLET(S): 5; 325 TABLET ORAL at 17:57

## 2018-10-22 NOTE — ED ADULT TRIAGE NOTE - NS ED NURSE DIRECT TO ROOM YN
No Patient admitted with progressive weakness and shortness of breath.  Initial Chest X-ray apparently clear but marked leukocytosis and subsequent staph in blood cultures indicated sepsis.  Initial EKG demonstrated ST depression and troponins were mildly positive.  He is currently intubated and appears reasonably comfortable - he denies chest pain.      PE-  /51  HR 67 regular  O2 sat on respirator 95%  NAD but intubated  Lungs decrease breath sounds   Heart soft S1, ST2  H. zoster across left chest at ~T5    Admission EKG deep ST depression in precordial leads suggesting ischemia - previous EKG mild ST depression only.  No significatn T inversions

## 2018-10-22 NOTE — H&P ADULT - NSHPREVIEWOFSYSTEMS_GEN_ALL_CORE

## 2018-10-22 NOTE — ED PROVIDER NOTE - OBJECTIVE STATEMENT
72yo male with pmh HTN, HL, ESRD on dialysis (TTS), COPD presents with sob and occasional cough x 2 weeks but worse last night. denies cp, fever, palpitaitons.     ROS: No fever/chills. No photophobia/eye pain/changes in vision, No ear pain/sore throat/dysphagia, No chest pain/palpitations. + SOB/cough, no stridor. No abdominal pain, N/V/D, no black/bloody bm. No dysuria/frequency/discharge, No headache. No Dizziness.  No rash.  No numbness/tingling/weakness.

## 2018-10-22 NOTE — ED PROVIDER NOTE - PSH
h/o  LUE A-V fistula    h/o AV fistula - left upper arm    H/O unilateral nephrectomy  9/2013  History of colon resection - April 2012    S/P kidney transplant  09/2013 in [place]

## 2018-10-22 NOTE — ED PROVIDER NOTE - MEDICAL DECISION MAKING DETAILS
Pt with copd exacerbation. ct with contrast wit no pe, but will need dialyisis. d/w dr. dietz foradmission.

## 2018-10-22 NOTE — H&P ADULT - PSH
h/o  LUE A-V fistula    h/o AV fistula - left upper arm    H/O unilateral nephrectomy  9/2013  History of colon resection - April 2012    S/P kidney transplant  09/2013 in [place]

## 2018-10-22 NOTE — ED ADULT NURSE NOTE - PSH
h/o  LUE A-V fistula    h/o AV fistula - left upper arm    H/O unilateral nephrectomy  9/2013  History of colon resection - April 2012    S/P kidney transplant  09/2013 in [place]

## 2018-10-22 NOTE — H&P ADULT - NSHPLABSRESULTS_GEN_ALL_CORE
11.0   7.02  )-----------( 113      ( 22 Oct 2018 08:01 )             36.7   10-22    141  |  101  |  39<H>  ----------------------------<  92  4.0   |  29  |  9.30<H>    Ca    8.9      22 Oct 2018 08:01    TPro  7.7  /  Alb  3.3  /  TBili  0.5  /  DBili  x   /  AST  8<L>  /  ALT  8<L>  /  AlkPhos  72  10-22  < from: CT Angio Chest w/ IV Cont (10.22.18 @ 10:16) >    Negative for pulmonary embolism.  Emphysema.    < from: Xray Chest 1 View AP/PA. (10.22.18 @ 08:47) >    IMPRESSION: Extensive right-sided vascular stenting as above. Possible   COPD.

## 2018-10-22 NOTE — ED ADULT NURSE NOTE - NSIMPLEMENTINTERV_GEN_ALL_ED
Implemented All Universal Safety Interventions:  [place] to call system. Call bell, personal items and telephone within reach. Instruct patient to call for assistance. Room bathroom lighting operational. Non-slip footwear when patient is off stretcher. Physically safe environment: no spills, clutter or unnecessary equipment. Stretcher in lowest position, wheels locked, appropriate side rails in place.

## 2018-10-22 NOTE — ED ADULT NURSE NOTE - NSSISCREENINGQ1_ED_A_ED
Progress Note    Client Name: Tami Rai  Date: 6/26/2018           Service Type: Individual      Session Start Time: 10:05am client arrived late  Session End Time: 10:55am      Session Length: 50 minutes     Session #: 10     Attendees: Client and Mother      PHQ-9 / FRANKLYN-7 : declined  Answers for HPI/ROS submitted by the patient on 5/14/2018   If you checked off any problems, how difficult have these problems made it for you to do your work, take care of things at home, or get along with other people?: Very difficult  PHQ9 TOTAL SCORE: 16     DATA      Progress Since Last Session (Related to Symptoms / Goals / Homework):   Symptoms: depression, anxiety    Homework: Achieved / completed to satisfaction      Episode of Care Goals: Satisfactory progress - ACTION (Actively working towards change); Intervened by reinforcing change plan / affirming steps taken     Current / Ongoing Stressors and Concerns:   academics, family, legal     Treatment Objective(s) Addressed in This Session:   identify at least 1-2 triggers for anxiety  Decrease frequency and intensity of feeling down, depressed, hopeless  Feel less tired and more energy during the day        Intervention:   CBT, solution focused  Client applied and received a job offer from [employer] this past week. They have promised her 20+ hours a week. She reports she gave notice at [employer] and her boss handled her resignation poorly. Processed related anxiety. Client and family were able to talk to sister about how her sleep schedule is effecting them, and sister is working on being more consciously aware of this. Client reports she had a good time at [employer] but feels she did not get to see and do everything she would like as one of her friends seemed to dictate what the group was doing and disregard their imput. Discussed how client may talk with friend about this. Client plans to talk with friend with sister.  Processed anxiety related to planning to move into an apartment in a year with this same friend, and friend wanting another friend of theirs to move in that client does not feel comfortable around. Encouraged client to talk with friend about this sticking to the facts of what makes her uncomfortable around this other friend. Discussed that client could choose to not live with her friend as well, and find another room mate. Client has been working on getting up in the morning rather than sleeping late, and has not been going outside much or using her happy light. Encouraged client to consistently use happy light 2 times a day.           ASSESSMENT: Current Emotional / Mental Status (status of significant symptoms):   Risk status (Self / Other harm or suicidal ideation)   Client denies current fears or concerns for personal safety.   Client denies current or recent suicidal ideation or behaviors.   Client denies current or recent homicidal ideation or behaviors.   Client denies current or recent self injurious behavior or ideation.   Client denies other safety concerns.   A safety and risk management plan has not been developed at this time, however client was given the after-hours number / 911 should there be a change in any of these risk factors.     Appearance:   Appropriate    Eye Contact:   Good    Psychomotor Behavior: Normal    Attitude:   Cooperative    Orientation:   All   Speech    Rate / Production: Normal     Volume:  Normal    Mood:    Anxious  Depressed    Affect:    Appropriate    Thought Content:  Clear    Thought Form:  Coherent  Logical    Insight:    Fair      Medication Review:   No changes to current psychiatric medication(s)   Current Outpatient Prescriptions   Medication     cholecalciferol (VITAMIN D) 400 UNITS tablet     ferrous gluconate (FERGON) 324 (38 FE) MG tablet     MELATONIN PO     norgestimate-ethinyl estradiol (ORTHO-CYCLEN, SPRINTEC) 0.25-35 MG-MCG per tablet     vortioxetine  (TRINTELLIX) 5 MG tablet     No current facility-administered medications for this visit.           Medication Compliance:   Yes     Changes in Health Issues:   None reported     Chemical Use Review:   Substance Use: Chemical use reviewed, no active concerns identified      Tobacco Use: No current tobacco use.       Collateral Reports Completed:   Not Applicable    PLAN: (Client Tasks / Therapist Tasks / Other)  Continue working on sleep hygiene, sleep training, use of melatonin, use of happy light, and using the following anxiety reduction techniques to improve sleep: journaling, progressive muscle relaxation, deep breathing, visualization/guided imagery.Talk with friend about concerns.     Dianna Reeves                                                    Treatment Plan    Client's Name: Tami Rai  YOB: 2002    Date: 4/5/2018    Diagnoses:            296.33 (F33.2) Major Depressive Disorder, Recurrent Episode, Severe  With melancholic features and With seasonal pattern   Rule out 300.23 (F40.10) Social Anxiety Disorder  300.02 (F41.1) Generalized Anxiety Disorder  Cluster C traits: Avoidant traits  Psychosocial & Contextual Factors: academics, family, legal    Referral / Collaboration:   Collaboration was initiated with PCP MD      Anticipated number of session or this episode of care: 16-20      MeasurableTreatment Goal(s) related to diagnosis / functional impairment(s)  Goal 1: Client will decrease depressed mood and anxiety as evidenced by PHQ9 and GAD7 scores 4 and Under within the next 3 months. Initial scoring: 3/22/2018:  GAD7:14,  PHQ9: 17.     I will know I've met my goal when my depression and anxiety symptoms are more managed.        Objective #A (Client Action)   Continued - Date(s): 6/26/2018  Client will identify 1-2 initial signs or symptoms of anxiety and utilize anxiety reduction techniques daily to decrease anxiety over the next month.Current Baseline is sporadic use.  Client will ID triggers for depression and anxiety and work towards decreasing reactivity to or eliminating stressor if possible. Client will develop more effective coping skills to manage depression and anxiety symptoms, will develop healthy cognitive patterns and beliefs, will increase ability to function adaptively and will continue to take medications as prescribed / participate in supportive activities. Client will improve communication with family, and share thoughts, feelings, and wants with others. Client will be able to maintain school attendance.    Intervention(s)   Therapist will teach client how to ID body cues for anxiety, anxiety reduction techniques, how to ID triggers for depression and anxiety- decrease reactivity/eliminate, lifestyle changes to reduce depression and anxiety, communication skills, family counseling, explore cognitive beliefs and help client to develop healthy cognitive patterns and beliefs.    Client and Parent / Guardian has reviewed and agreed to the above plan.      Dianna Reeves  April 5, 2018                        No

## 2018-10-22 NOTE — H&P ADULT - ASSESSMENT
73m with emphysema and end stage renal disease with short of breath and cough       IMPROVE VTE Individual Risk Assessment          RISK                                                          Points  [  ] Previous VTE                                                3  [  ] Thrombophilia                                             2  [  ] Lower limb paralysis                                   2        (unable to hold up >15 seconds)    [  ] Current Cancer                                             2         (within 6 months)  [  ] Immobilization > 24 hrs                              1  [  ] ICU/CCU stay > 24 hours                             1  [  ] Age > 60                                                         1    IMPROVE VTE Score: 2

## 2018-10-22 NOTE — CONSULT NOTE ADULT - SUBJECTIVE AND OBJECTIVE BOX
Information from chart:  "Patient is a 73y old  Male who presents with a chief complaint of short of breath (22 Oct 2018 11:13)    HPI:  72yo male with pmh HTN, HL, ESRD on dialysis (TTS), COPD presents with sob and occasional cough x 2 weeks but worse last night. denies cp, fever, palpitaitons. - patient last had hemodialysis yesterday but no help with the short of breath - asociated with productive cough whitish in color with  no chest or fever (22 Oct 2018 11:13)   "  Patient feeling better, in bed no distress;       PAST MEDICAL & SURGICAL HISTORY:  Enlarged prostate without lower urinary tract symptoms (luts)  Emphysema/COPD  Diverticulitis of colon  Aortic valve disorder  AV fistula infection, sequela  Arterial insufficiency of lower extremity: RLE  Dizziness  HLD (hyperlipidemia)  Tobacco abuse: Quit in 2005, Smoked 2.5 PPD for 15 years  COPD (chronic obstructive pulmonary disease)  ESRD (end stage renal disease)  BPH (benign prostatic hyperplasia)  PE (pulmonary embolism)  History of smoking 2 pac for 25 yrs , quit 7 yrs ago  Diverticulitis of colon  HTN (hypertension)  H/O unilateral nephrectomy: 9/2013  S/P kidney transplant: 09/2013 in [place]  h/o AV fistula - left upper arm  History of colon resection - April 2012  h/o  LUE A-V fistula    FAMILY HISTORY:  No pertinent family history in first degree relatives    Allergies    No Known Allergies    Intolerances      Home Medications:  aspirin 81 mg oral tablet, chewable: 1 tab(s) orally once a day (31 Jul 2017 07:00)  docusate sodium 100 mg oral capsule: 1 cap(s) orally 2 times a day, As needed, Constipation (31 Jul 2017 07:00)  famotidine 20 mg oral tablet: 1 tab(s) orally once a day (31 Jul 2017 07:00)  Sensipar 30 mg oral tablet: 1 tab(s) orally once a day (31 Jul 2017 07:00)  Symbicort 160 mcg-4.5 mcg/inh inhalation aerosol: 2 puff(s) inhaled 2 times a day (31 Jul 2017 07:00)    MEDICATIONS  (STANDING):  ALBUTerol/ipratropium for Nebulization 3 milliLiter(s) Nebulizer every 6 hours  aspirin  chewable 81 milliGRAM(s) Oral daily  cinacalcet 30 milliGRAM(s) Oral daily  cloNIDine 0.1 milliGRAM(s) Oral every 12 hours  docusate sodium 100 milliGRAM(s) Oral two times a day  famotidine    Tablet 20 milliGRAM(s) Oral every 48 hours  methylPREDNISolone sodium succinate Injectable 40 milliGRAM(s) IV Push every 6 hours    MEDICATIONS  (PRN):  oxyCODONE    5 mG/acetaminophen 325 mG 1 Tablet(s) Oral every 6 hours PRN Moderate Pain (4 - 6)  polyethylene glycol 3350 17 Gram(s) Oral daily PRN Constipation    Vital Signs Last 24 Hrs  T(C): 35.8 (22 Oct 2018 14:20), Max: 36.9 (22 Oct 2018 07:41)  T(F): 96.5 (22 Oct 2018 14:20), Max: 98.4 (22 Oct 2018 07:41)  HR: 96 (22 Oct 2018 14:20) (93 - 100)  BP: 122/75 (22 Oct 2018 14:20) (122/75 - 220/103)  BP(mean): --  RR: 18 (22 Oct 2018 14:20) (18 - 23)  SpO2: 95% (22 Oct 2018 14:20) (95% - 100%)    Daily Height in cm: 193.04 (22 Oct 2018 14:20)    Daily     10-22-18 @ 07:01  -  10-22-18 @ 16:33  --------------------------------------------------------  IN: 50 mL / OUT: 0 mL / NET: 50 mL      CAPILLARY BLOOD GLUCOSE        PHYSICAL EXAM:      T(C): 35.8 (10-22-18 @ 14:20), Max: 36.9 (10-22-18 @ 07:41)  HR: 96 (10-22-18 @ 14:20) (93 - 100)  BP: 122/75 (10-22-18 @ 14:20) (122/75 - 220/103)  RR: 18 (10-22-18 @ 14:20) (18 - 23)  SpO2: 95% (10-22-18 @ 14:20) (95% - 100%)  Wt(kg): --  Respiratory: clear anteriorly, decreased BS at bases right base  Cardiovascular: S1 S2  Gastrointestinal: soft NT ND +BS  Extremities:  1 edema              10-22    141  |  101  |  39<H>  ----------------------------<  92  4.0   |  29  |  9.30<H>    Ca    8.9      22 Oct 2018 08:01    TPro  7.7  /  Alb  3.3  /  TBili  0.5  /  DBili  x   /  AST  8<L>  /  ALT  8<L>  /  AlkPhos  72  10-22                          11.0   7.02  )-----------( 113      ( 22 Oct 2018 08:01 )             36.7       Assessment and Plan:    Suspected COPD exacerbation; clinically stable;   Will arrange HD for AM.

## 2018-10-22 NOTE — ED PROVIDER NOTE - PHYSICAL EXAMINATION
Gen: Alert, Well appearing. NAD    Head: NC, AT, PERRL, EOMI, normal lids/conjunctiva   ENT: Bilateral TM WNL, normal hearing, patent oropharynx without erythema/exudate, uvula midline  Neck: supple, no tenderness/meningismus/JVD   Pulm: Bilateral clear BS, normal resp effort, no wheeze/stridor/retractions  CV: RRR, no M/R/G, +dist pulses   Abd: soft, NT/ND, +BS, no guarding/rebound tenderness  Mskel: no edema/erythema/cyanosis   Skin: no rash   Neuro: AAOx3, no sensory/motor deficits, CN 2-12 intact Gen: Alert, Well appearing. NAD    Head: NC, AT, PERRL, EOMI, normal lids/conjunctiva   ENT: Bilateral TM WNL, normal hearing, patent oropharynx without erythema/exudate, uvula midline  Neck: supple, no tenderness/meningismus/JVD   Pulm: diminished  CV: RRR, no M/R/G, +dist pulses   Abd: soft, NT/ND, +BS, no guarding/rebound tenderness  Mskel: no edema/erythema/cyanosis   Skin: no rash   Neuro: AAOx3, no sensory/motor deficits, CN 2-12 intact

## 2018-10-22 NOTE — H&P ADULT - HISTORY OF PRESENT ILLNESS
72yo male with pmh HTN, HL, ESRD on dialysis (TTS), COPD presents with sob and occasional cough x 2 weeks but worse last night. denies cp, fever, palpitaitons. 74yo male with pmh HTN, HL, ESRD on dialysis (TTS), COPD presents with sob and occasional cough x 2 weeks but worse last night. denies cp, fever, palpitaitons. - patient last had hemodialysis yesterday but no help with the short of breath - asociated with productive cough whitish in color with  no chest o 74yo male with pmh HTN, HL, ESRD on dialysis (TTS), COPD presents with sob and occasional cough x 2 weeks but worse last night. denies cp, fever, palpitaitons. - patient last had hemodialysis yesterday but no help with the short of breath - asociated with productive cough whitish in color with  no chest or fever

## 2018-10-23 LAB
ANION GAP SERPL CALC-SCNC: 14 MMOL/L — SIGNIFICANT CHANGE UP (ref 5–17)
BUN SERPL-MCNC: 67 MG/DL — HIGH (ref 7–23)
CALCIUM SERPL-MCNC: 9 MG/DL — SIGNIFICANT CHANGE UP (ref 8.5–10.1)
CHLORIDE SERPL-SCNC: 95 MMOL/L — LOW (ref 96–108)
CO2 SERPL-SCNC: 26 MMOL/L — SIGNIFICANT CHANGE UP (ref 22–31)
CREAT SERPL-MCNC: 11.8 MG/DL — HIGH (ref 0.5–1.3)
GLUCOSE SERPL-MCNC: 143 MG/DL — HIGH (ref 70–99)
HBV CORE AB SER-ACNC: SIGNIFICANT CHANGE UP
HBV SURFACE AB SER-ACNC: <3 MIU/ML — LOW
HBV SURFACE AG SER-ACNC: SIGNIFICANT CHANGE UP
HCT VFR BLD CALC: 32.7 % — LOW (ref 39–50)
HCV AB S/CO SERPL IA: 0.13 S/CO — SIGNIFICANT CHANGE UP
HCV AB SERPL-IMP: SIGNIFICANT CHANGE UP
HGB BLD-MCNC: 10 G/DL — LOW (ref 13–17)
MCHC RBC-ENTMCNC: 24 PG — LOW (ref 27–34)
MCHC RBC-ENTMCNC: 30.6 GM/DL — LOW (ref 32–36)
MCV RBC AUTO: 78.4 FL — LOW (ref 80–100)
NRBC # BLD: 0 /100 WBCS — SIGNIFICANT CHANGE UP (ref 0–0)
PLATELET # BLD AUTO: 141 K/UL — LOW (ref 150–400)
POTASSIUM SERPL-MCNC: 5.1 MMOL/L — SIGNIFICANT CHANGE UP (ref 3.5–5.3)
POTASSIUM SERPL-SCNC: 5.1 MMOL/L — SIGNIFICANT CHANGE UP (ref 3.5–5.3)
RBC # BLD: 4.17 M/UL — LOW (ref 4.2–5.8)
RBC # FLD: 16.4 % — HIGH (ref 10.3–14.5)
SODIUM SERPL-SCNC: 135 MMOL/L — SIGNIFICANT CHANGE UP (ref 135–145)
WBC # BLD: 7.05 K/UL — SIGNIFICANT CHANGE UP (ref 3.8–10.5)
WBC # FLD AUTO: 7.05 K/UL — SIGNIFICANT CHANGE UP (ref 3.8–10.5)

## 2018-10-23 PROCEDURE — 99233 SBSQ HOSP IP/OBS HIGH 50: CPT

## 2018-10-23 RX ORDER — HEPARIN SODIUM 5000 [USP'U]/ML
5000 INJECTION INTRAVENOUS; SUBCUTANEOUS EVERY 12 HOURS
Qty: 0 | Refills: 0 | Status: DISCONTINUED | OUTPATIENT
Start: 2018-10-23 | End: 2018-10-23

## 2018-10-23 RX ORDER — HEPARIN SODIUM 5000 [USP'U]/ML
5000 INJECTION INTRAVENOUS; SUBCUTANEOUS EVERY 8 HOURS
Qty: 0 | Refills: 0 | Status: DISCONTINUED | OUTPATIENT
Start: 2018-10-23 | End: 2018-10-25

## 2018-10-23 RX ORDER — BUDESONIDE AND FORMOTEROL FUMARATE DIHYDRATE 160; 4.5 UG/1; UG/1
2 AEROSOL RESPIRATORY (INHALATION)
Qty: 0 | Refills: 0 | Status: DISCONTINUED | OUTPATIENT
Start: 2018-10-23 | End: 2018-10-25

## 2018-10-23 RX ADMIN — Medication 0.1 MILLIGRAM(S): at 06:29

## 2018-10-23 RX ADMIN — HEPARIN SODIUM 5000 UNIT(S): 5000 INJECTION INTRAVENOUS; SUBCUTANEOUS at 22:25

## 2018-10-23 RX ADMIN — BUDESONIDE AND FORMOTEROL FUMARATE DIHYDRATE 2 PUFF(S): 160; 4.5 AEROSOL RESPIRATORY (INHALATION) at 18:04

## 2018-10-23 RX ADMIN — Medication 40 MILLIGRAM(S): at 22:26

## 2018-10-23 RX ADMIN — Medication 81 MILLIGRAM(S): at 16:01

## 2018-10-23 RX ADMIN — Medication 40 MILLIGRAM(S): at 16:00

## 2018-10-23 RX ADMIN — CINACALCET 30 MILLIGRAM(S): 30 TABLET, FILM COATED ORAL at 16:02

## 2018-10-23 RX ADMIN — Medication 100 MILLIGRAM(S): at 18:04

## 2018-10-23 RX ADMIN — Medication 0.1 MILLIGRAM(S): at 18:05

## 2018-10-23 RX ADMIN — Medication 3 MILLILITER(S): at 05:37

## 2018-10-23 RX ADMIN — Medication 3 MILLILITER(S): at 00:57

## 2018-10-23 RX ADMIN — Medication 40 MILLIGRAM(S): at 06:29

## 2018-10-23 RX ADMIN — Medication 100 MILLIGRAM(S): at 06:29

## 2018-10-23 RX ADMIN — Medication 3 MILLILITER(S): at 17:44

## 2018-10-23 RX ADMIN — HEPARIN SODIUM 5000 UNIT(S): 5000 INJECTION INTRAVENOUS; SUBCUTANEOUS at 16:01

## 2018-10-23 NOTE — PROGRESS NOTE ADULT - SUBJECTIVE AND OBJECTIVE BOX
Patient is a 73y old  Male who presents with a chief complaint of short of breath (22 Oct 2018 16:33)      INTERVAL HPI/OVERNIGHT EVENTS: no evens     MEDICATIONS  (STANDING):  ALBUTerol/ipratropium for Nebulization 3 milliLiter(s) Nebulizer every 6 hours  aspirin  chewable 81 milliGRAM(s) Oral daily  cinacalcet 30 milliGRAM(s) Oral daily  cloNIDine 0.1 milliGRAM(s) Oral every 12 hours  docusate sodium 100 milliGRAM(s) Oral two times a day  famotidine    Tablet 20 milliGRAM(s) Oral every 48 hours  heparin  Injectable 5000 Unit(s) SubCutaneous every 8 hours  methylPREDNISolone sodium succinate Injectable 40 milliGRAM(s) IV Push every 6 hours    MEDICATIONS  (PRN):  oxyCODONE    5 mG/acetaminophen 325 mG 1 Tablet(s) Oral every 6 hours PRN Moderate Pain (4 - 6)  polyethylene glycol 3350 17 Gram(s) Oral daily PRN Constipation      Allergies    No Known Allergies    Intolerances        REVIEW OF SYSTEMS:  CONSTITUTIONAL: No fever, weight loss, or fatigue  EYES: No eye pain, visual disturbances, or discharge  ENMT:  No difficulty hearing, tinnitus, vertigo; No sinus or throat pain  NECK: No pain or stiffness  BREASTS: No pain, masses, or nipple discharge  RESPIRATORY: No cough, wheezing, chills or hemoptysis; No shortness of breath  CARDIOVASCULAR: No chest pain, palpitations, dizziness, or leg swelling  GASTROINTESTINAL: No abdominal or epigastric pain. No nausea, vomiting, or hematemesis; No diarrhea or constipation. No melena or hematochezia.  GENITOURINARY: No dysuria, frequency, hematuria, or incontinence  NEUROLOGICAL: No headaches, memory loss, loss of strength, numbness, or tremors  SKIN: No itching, burning, rashes, or lesions   LYMPH NODES: No enlarged glands  ENDOCRINE: No heat or cold intolerance; No hair loss  MUSCULOSKELETAL: No joint pain or swelling; No muscle, back, or extremity pain  PSYCHIATRIC: No depression, anxiety, mood swings, or difficulty sleeping  HEME/LYMPH: No easy bruising, or bleeding gums  ALLERGY AND IMMUNOLOGIC: No hives or eczema    Vital Signs Last 24 Hrs  T(C): 35 (23 Oct 2018 09:32), Max: 36.5 (22 Oct 2018 17:00)  T(F): 95 (23 Oct 2018 09:32), Max: 97.7 (22 Oct 2018 17:00)  HR: 98 (23 Oct 2018 09:32) (78 - 103)  BP: 165/82 (23 Oct 2018 09:32) (122/75 - 177/86)  BP(mean): --  RR: 19 (23 Oct 2018 09:32) (18 - 22)  SpO2: 99% (23 Oct 2018 09:32) (95% - 99%)    PHYSICAL EXAM:  GENERAL: NAD, well-groomed, well-developed  HEAD:  Atraumatic, Normocephalic  EYES: EOMI, PERRLA, conjunctiva and sclera clear  ENMT: No tonsillar erythema, exudates, or enlargement; Moist mucous membranes, Good dentition, No lesions  NECK: Supple, No JVD, Normal thyroid  NERVOUS SYSTEM:  Alert & Oriented X3, Good concentration; Motor Strength 5/5 B/L upper and lower extremities; DTRs 2+ intact and symmetric  CHEST/LUNG:  b/l rhonci scattered wheezes   HEART: Regular rate and rhythm; No murmurs, rubs, or gallops  ABDOMEN: Soft, Nontender, Nondistended; Bowel sounds present  EXTREMITIES:  2+ Peripheral Pulses, No clubbing, cyanosis, or edema  LYMPH: No lymphadenopathy noted  SKIN: No rashes or lesions    LABS:                        10.0   7.05  )-----------( 141      ( 23 Oct 2018 11:32 )             32.7     10-23    135  |  95<L>  |  67<H>  ----------------------------<  143<H>  5.1   |  26  |  11.80<H>    Ca    9.0      23 Oct 2018 11:32    TPro  7.7  /  Alb  3.3  /  TBili  0.5  /  DBili  x   /  AST  8<L>  /  ALT  8<L>  /  AlkPhos  72  10-22    PT/INR - ( 22 Oct 2018 08:01 )   PT: 11.4 sec;   INR: 1.04 ratio         PTT - ( 22 Oct 2018 08:01 )  PTT:32.2 sec    CAPILLARY BLOOD GLUCOSE          RADIOLOGY & ADDITIONAL TESTS:    Imaging Personally Reviewed:  [ X] YES  [ ] NO    Consultant(s) Notes Reviewed:  [ X] YES  [ ] NO    Care Discussed with Consultants/Other Providers [X ] YES  [ ] NO

## 2018-10-23 NOTE — CONSULT NOTE ADULT - SUBJECTIVE AND OBJECTIVE BOX
Patient is a 73y old  Male who presents with a chief complaint of short of breath (23 Oct 2018 15:25)    HPI:  72yo male with  HTN, HLD, ESRD on dialysis (TTS), COPD and BPH,  presents with sob and occasional cough x 2 weeks but worse last night. denies cp, fever, palpitaitons. - patient last had hemodialysis yesterday but no help with the short of breath - asociated with productive cough whitish in color with  no chest or fever . Admitted with COPD exacerbation.  Smoked close to 40 years, quit 20 years ago.    PAST MEDICAL & SURGICAL HISTORY:  Enlarged prostate without lower urinary tract symptoms (luts)  Emphysema/COPD  Diverticulitis of colon  Aortic valve disorder  AV fistula infection, sequela  Arterial insufficiency of lower extremity: RLE  Dizziness  HLD (hyperlipidemia)  Tobacco abuse: Quit in 2005, Smoked 2.5 PPD for 15 years  COPD (chronic obstructive pulmonary disease)  ESRD (end stage renal disease)  BPH (benign prostatic hyperplasia)  PE (pulmonary embolism)  History of smoking 2 pac for 25 yrs , quit 7 yrs ago  Diverticulitis of colon  HTN (hypertension)  H/O unilateral nephrectomy: 9/2013  S/P kidney transplant: 09/2013 in [place]  h/o AV fistula - left upper arm  History of colon resection - April 2012  h/o  LUE A-V fistula    FAMILY HISTORY:  No pertinent family history in first degree relatives    SOCIAL HISTORY: smoked 40 years, quit 20 years ago.    Allergies  No Known Allergies    MEDICATIONS  (STANDING):  ALBUTerol/ipratropium for Nebulization 3 milliLiter(s) Nebulizer every 6 hours  aspirin  chewable 81 milliGRAM(s) Oral daily  cinacalcet 30 milliGRAM(s) Oral daily  cloNIDine 0.1 milliGRAM(s) Oral every 12 hours  docusate sodium 100 milliGRAM(s) Oral two times a day  famotidine    Tablet 20 milliGRAM(s) Oral every 48 hours  heparin  Injectable 5000 Unit(s) SubCutaneous every 8 hours  methylPREDNISolone sodium succinate Injectable 40 milliGRAM(s) IV Push every 6 hours    MEDICATIONS  (PRN):  oxyCODONE    5 mG/acetaminophen 325 mG 1 Tablet(s) Oral every 6 hours PRN Moderate Pain (4 - 6)  polyethylene glycol 3350 17 Gram(s) Oral daily PRN Constipation    REVIEW OF SYSTEMS:    Constitutional:            No fever, weight loss or fatigue  HEENT:                      No difficulty hearing, tinnitus, vertigo; No sinus or throat pain  Respiratory:               Cough, SOB.  Cardiovascular:           No chest pain, palpitations  Gastrointestinal:        No abdominal or epigastric pain. No N/V/diarrhea or hematemesis  Genitourinary:            No dysuria, frequency, hematuria or incontinence  SKIN:                         no rash  Musculoskeletal:        No joint pain or swelling  Extremities:                No swelling  Neurological:              No headaches  Psychiatric:                 No depression, anxiety    MACRA & MIPS:   Vaccines - Influenza: yes and Pneumovax: no  BMI:  normal  Tobacco:  ex smoker  Blood Pressure Screening / Control of: 177/86  Current Medications Reviewed:  yes    Vital Signs Last 24 Hrs  T(C): 36.7 (23 Oct 2018 14:51), Max: 36.7 (23 Oct 2018 14:51)  T(F): 98 (23 Oct 2018 14:51), Max: 98 (23 Oct 2018 14:51)  HR: 92 (23 Oct 2018 14:51) (78 - 103)  BP: 147/67 (23 Oct 2018 14:51) (147/67 - 177/86)  BP(mean): --  RR: 18 (23 Oct 2018 10:10) (18 - 22)  SpO2: 100% (23 Oct 2018 10:10) (95% - 100%)    PHYSICAL EXAM:  GEN:         Awake, responsive and comfortable.  HEENT:    Normal.    RESP:       decreased air entry  CVS:          Regular rate and rhythm.   ABD:         Soft, non-tender, non-distended;   :             No costovertebral angle tenderness  SKIN:           Warm and dry.  EXTR:            No clubbing, cyanosis or edema  CNS:              Intact sensory and motor function.  PSYCH:        cooperative, no anxiety or depression    LABS:                        10.0   7.05  )-----------( 141      ( 23 Oct 2018 11:32 )             32.7     10-23    135  |  95<L>  |  67<H>  ----------------------------<  143<H>  5.1   |  26  |  11.80<H>    Ca    9.0      23 Oct 2018 11:32    TPro  7.7  /  Alb  3.3  /  TBili  0.5  /  DBili  x   /  AST  8<L>  /  ALT  8<L>  /  AlkPhos  72  10-22    PT/INR - ( 22 Oct 2018 08:01 )   PT: 11.4 sec;   INR: 1.04 ratio      PTT - ( 22 Oct 2018 08:01 )  PTT:32.2 sec    EKG: sinus rhythm    RADIOLOGY & ADDITIONAL STUDIES:  < from: CT Angio Chest w/ IV Cont (10.22.18 @ 10:16) >    EXAM:  CT ANGIO CHEST (W)AW IC                          PROCEDURE DATE:  10/22/2018      INTERPRETATION:  CLINICAL INFORMATION: Shortness of breath    COMPARISON: Abdominal CT dated 3/8/2017    PROCEDURE:   CT Angiography of the Chest.  75 mlof Omnipaque 350 was injected intravenously. 25 ml were discarded.  Sagittal and coronal reformats were performed as well as 3D (MIP)   reconstructions.    FINDINGS:    CHEST:     LUNGS AND LARGE AIRWAYS: Patent central airways.  Emphysema. Bilateral   dependent and basilar atelectasis.  PLEURA: Trace bilateral pleural effusions.  VESSELS: Atherosclerotic changes of thoracic aorta and coronary arteries.   No thoracic aortic aneurysm or dissection. Superior vena cava, right   subclavian/axillaryand right upper extremity venous stents. Multiple   images are degraded by respiratory motion. Excluding regions with motion   artifact, there are no pulmonary arterial filling defects identified.  HEART: Enlarged. No pericardial effusion.  MEDIASTINUM AND SEBASTIAN: No lymphadenopathy.  CHEST WALL AND LOWER NECK: Fluid distended bursa in the right shoulder.  VISUALIZED UPPER ABDOMEN: Hiatal hernia. Bilateral renal cysts. Adrenal   thickening.  BONES: Spinal degenerative changes.    IMPRESSION:     Negative for pulmonary embolism.  Emphysema.    SONIA OSPINA M.D., ATTENDING RADIOLOGIST  This document has been electronically signed. Oct 22 2018 10:35AM      ASSESSMENT AND PLAN:  ·	SOB.  ·	Acute COPD exacerbation.  ·	ESRD.  ·	HTN  ·	Anemia.  ·	HLD.  ·	BPH.    Continue O2, nebulizer.  Will reduce steroids, add Symbicort.  PFT out pt.

## 2018-10-23 NOTE — PROGRESS NOTE ADULT - SUBJECTIVE AND OBJECTIVE BOX
Patient feels well, SOB earlier responded to medications;     MEDICATIONS  (STANDING):  ALBUTerol/ipratropium for Nebulization 3 milliLiter(s) Nebulizer every 6 hours  aspirin  chewable 81 milliGRAM(s) Oral daily  cinacalcet 30 milliGRAM(s) Oral daily  cloNIDine 0.1 milliGRAM(s) Oral every 12 hours  docusate sodium 100 milliGRAM(s) Oral two times a day  famotidine    Tablet 20 milliGRAM(s) Oral every 48 hours  heparin  Injectable 5000 Unit(s) SubCutaneous every 8 hours  methylPREDNISolone sodium succinate Injectable 40 milliGRAM(s) IV Push every 6 hours    MEDICATIONS  (PRN):  oxyCODONE    5 mG/acetaminophen 325 mG 1 Tablet(s) Oral every 6 hours PRN Moderate Pain (4 - 6)  polyethylene glycol 3350 17 Gram(s) Oral daily PRN Constipation      10-22-18 @ 07:01  -  10-23-18 @ 07:00  --------------------------------------------------------  IN: 50 mL / OUT: 0 mL / NET: 50 mL      PHYSICAL EXAM:      T(C): 36.7 (10-23-18 @ 14:51), Max: 36.7 (10-23-18 @ 14:51)  HR: 92 (10-23-18 @ 14:51) (78 - 103)  BP: 147/67 (10-23-18 @ 14:51) (147/67 - 177/86)  RR: 18 (10-23-18 @ 10:10) (18 - 22)  SpO2: 100% (10-23-18 @ 10:10) (95% - 100%)  Wt(kg): --  Respiratory: clear anteriorly, decreased BS at bases no rales  Cardiovascular: S1 S2  Gastrointestinal: soft NT ND +BS  Extremities:  tr  edema                                    10.0   7.05  )-----------( 141      ( 23 Oct 2018 11:32 )             32.7     10-23    135  |  95<L>  |  67<H>  ----------------------------<  143<H>  5.1   |  26  |  11.80<H>    Ca    9.0      23 Oct 2018 11:32    TPro  7.7  /  Alb  3.3  /  TBili  0.5  /  DBili  x   /  AST  8<L>  /  ALT  8<L>  /  AlkPhos  72  10-22      LIVER FUNCTIONS - ( 22 Oct 2018 08:01 )  Alb: 3.3 g/dL / Pro: 7.7 gm/dL / ALK PHOS: 72 U/L / ALT: 8 U/L / AST: 8 U/L / GGT: x             Assessment and Plan:  Maintenance HD  UF as tolerated;   Will follow.

## 2018-10-23 NOTE — PHYSICAL THERAPY INITIAL EVALUATION ADULT - PERTINENT HX OF CURRENT PROBLEM, REHAB EVAL
Patient admitted with SOB and occasional cough x 2 weeks, worst last night, found to have COPD exacerbation. CT angio negative for PE, shows emphysema.

## 2018-10-23 NOTE — PHYSICAL THERAPY INITIAL EVALUATION ADULT - ADDITIONAL COMMENTS
Patient reports that he lives alone in an apartment with no stairs to get in, +elevator. He reports he owns a cane but does not use it.

## 2018-10-24 ENCOUNTER — TRANSCRIPTION ENCOUNTER (OUTPATIENT)
Age: 73
End: 2018-10-24

## 2018-10-24 DIAGNOSIS — Z71.89 OTHER SPECIFIED COUNSELING: ICD-10-CM

## 2018-10-24 LAB
ANION GAP SERPL CALC-SCNC: 11 MMOL/L — SIGNIFICANT CHANGE UP (ref 5–17)
BUN SERPL-MCNC: 41 MG/DL — HIGH (ref 7–23)
CALCIUM SERPL-MCNC: 8.9 MG/DL — SIGNIFICANT CHANGE UP (ref 8.5–10.1)
CHLORIDE SERPL-SCNC: 103 MMOL/L — SIGNIFICANT CHANGE UP (ref 96–108)
CO2 SERPL-SCNC: 27 MMOL/L — SIGNIFICANT CHANGE UP (ref 22–31)
CREAT SERPL-MCNC: 7.8 MG/DL — HIGH (ref 0.5–1.3)
GLUCOSE SERPL-MCNC: 116 MG/DL — HIGH (ref 70–99)
HCT VFR BLD CALC: 33.7 % — LOW (ref 39–50)
HGB BLD-MCNC: 10 G/DL — LOW (ref 13–17)
MCHC RBC-ENTMCNC: 23.6 PG — LOW (ref 27–34)
MCHC RBC-ENTMCNC: 29.7 GM/DL — LOW (ref 32–36)
MCV RBC AUTO: 79.7 FL — LOW (ref 80–100)
NRBC # BLD: 0 /100 WBCS — SIGNIFICANT CHANGE UP (ref 0–0)
PLATELET # BLD AUTO: 152 K/UL — SIGNIFICANT CHANGE UP (ref 150–400)
POTASSIUM SERPL-MCNC: 4.7 MMOL/L — SIGNIFICANT CHANGE UP (ref 3.5–5.3)
POTASSIUM SERPL-SCNC: 4.7 MMOL/L — SIGNIFICANT CHANGE UP (ref 3.5–5.3)
RBC # BLD: 4.23 M/UL — SIGNIFICANT CHANGE UP (ref 4.2–5.8)
RBC # FLD: 16.3 % — HIGH (ref 10.3–14.5)
SODIUM SERPL-SCNC: 141 MMOL/L — SIGNIFICANT CHANGE UP (ref 135–145)
WBC # BLD: 6.52 K/UL — SIGNIFICANT CHANGE UP (ref 3.8–10.5)
WBC # FLD AUTO: 6.52 K/UL — SIGNIFICANT CHANGE UP (ref 3.8–10.5)

## 2018-10-24 PROCEDURE — 99233 SBSQ HOSP IP/OBS HIGH 50: CPT

## 2018-10-24 PROCEDURE — 99498 ADVNCD CARE PLAN ADDL 30 MIN: CPT

## 2018-10-24 PROCEDURE — 99497 ADVNCD CARE PLAN 30 MIN: CPT

## 2018-10-24 RX ADMIN — Medication 3 MILLILITER(S): at 23:16

## 2018-10-24 RX ADMIN — Medication 0.1 MILLIGRAM(S): at 05:57

## 2018-10-24 RX ADMIN — Medication 20 MILLIGRAM(S): at 22:13

## 2018-10-24 RX ADMIN — Medication 3 MILLILITER(S): at 11:54

## 2018-10-24 RX ADMIN — Medication 3 MILLILITER(S): at 06:05

## 2018-10-24 RX ADMIN — CINACALCET 30 MILLIGRAM(S): 30 TABLET, FILM COATED ORAL at 11:36

## 2018-10-24 RX ADMIN — Medication 0.1 MILLIGRAM(S): at 17:32

## 2018-10-24 RX ADMIN — BUDESONIDE AND FORMOTEROL FUMARATE DIHYDRATE 2 PUFF(S): 160; 4.5 AEROSOL RESPIRATORY (INHALATION) at 05:57

## 2018-10-24 RX ADMIN — Medication 40 MILLIGRAM(S): at 13:21

## 2018-10-24 RX ADMIN — Medication 3 MILLILITER(S): at 18:01

## 2018-10-24 RX ADMIN — Medication 40 MILLIGRAM(S): at 05:55

## 2018-10-24 RX ADMIN — HEPARIN SODIUM 5000 UNIT(S): 5000 INJECTION INTRAVENOUS; SUBCUTANEOUS at 05:57

## 2018-10-24 RX ADMIN — HEPARIN SODIUM 5000 UNIT(S): 5000 INJECTION INTRAVENOUS; SUBCUTANEOUS at 22:13

## 2018-10-24 RX ADMIN — Medication 100 MILLIGRAM(S): at 17:32

## 2018-10-24 RX ADMIN — Medication 100 MILLIGRAM(S): at 05:57

## 2018-10-24 RX ADMIN — HEPARIN SODIUM 5000 UNIT(S): 5000 INJECTION INTRAVENOUS; SUBCUTANEOUS at 13:21

## 2018-10-24 RX ADMIN — Medication 3 MILLILITER(S): at 00:05

## 2018-10-24 RX ADMIN — Medication 81 MILLIGRAM(S): at 11:24

## 2018-10-24 RX ADMIN — FAMOTIDINE 20 MILLIGRAM(S): 10 INJECTION INTRAVENOUS at 14:34

## 2018-10-24 NOTE — DISCHARGE NOTE ADULT - MEDICATION SUMMARY - MEDICATIONS TO TAKE
I will START or STAY ON the medications listed below when I get home from the hospital:    Physical Therapy  -- Evaluate and Treat TIW    R26.81 Unsteadiness on feet  -- Indication: For preventive    acetaminophen-oxycodone 325 mg-5 mg oral tablet  -- 1 tab(s) by mouth every 6 hours, As needed, Moderate Pain (4 - 6) MDD:4  -- Indication: For preventive    aspirin 81 mg oral tablet, chewable  -- 1 tab(s) by mouth once a day  -- Indication: For preventive    cloNIDine 0.1 mg oral tablet  -- 1 tab(s) by mouth every 12 hours  -- Indication: For Essential hypertension    Symbicort 160 mcg-4.5 mcg/inh inhalation aerosol  -- 2 puff(s) inhaled 2 times a day  -- Indication: For COPD EXACERBATION    albuterol 90 mcg/inh inhalation aerosol  -- 2 puff(s) inhaled every 6 hours  -- Indication: For COPD EXACERBATION    Sensipar 30 mg oral tablet  -- 1 tab(s) by mouth once a day  -- Indication: For preventive    famotidine 20 mg oral tablet  -- 1 tab(s) by mouth once a day  -- Indication: For preventive    docusate sodium 100 mg oral capsule  -- 1 cap(s) by mouth 2 times a day, As needed, Constipation  -- Indication: For preventive

## 2018-10-24 NOTE — DISCHARGE NOTE ADULT - PATIENT PORTAL LINK FT
You can access the [facility], offered by [facility], by registering with the following website: [email or web address]

## 2018-10-24 NOTE — PHARMACOTHERAPY INTERVENTION NOTE - COMMENTS
RECCOMMENDED TO RENALLY ADJUST DOSE OF FAMOTIDINE 20MG QD TO 20MG Q48H BASED ON PTS CR.CLEARANCE OF 8.

## 2018-10-24 NOTE — CONSULT NOTE ADULT - ASSESSMENT
Family meeting on: DATE: 10/24/18 Met and spoke to patient who is alert and oriented x3 and discussed goals of care and advance care planning. Educated on MOLST Form and patient stated that he don't wanted to be resuscitated but reluctant to sign the MOLST form. He stated that his grand daughter is his HCP (Yemi yemi White). Plan is to continue aggressive treatment and when medically stable D/C back home.  RECOMMENDATIONS: CONSIDER DNR/ DNI.

## 2018-10-24 NOTE — DISCHARGE NOTE ADULT - CARE PLAN
Principal Discharge DX:	COPD exacerbation  Goal:	Avoid future exacerbations  Assessment and plan of treatment:	Take medications as prescribed  Follow up with PMD, pulmonologist  Secondary Diagnosis:	ESRD (end stage renal disease)  Assessment and plan of treatment:	Continue maintenance HD  Follow up with nephrologist  Secondary Diagnosis:	Essential hypertension  Assessment and plan of treatment:	Continue medications as prescribed  Follow up with PMD  Low-sodium diet  AHA Recipes - [email or web address]  Secondary Diagnosis:	BPH (benign prostatic hyperplasia)  Assessment and plan of treatment:	Stable  Continue medications as prescribed

## 2018-10-24 NOTE — CONSULT NOTE ADULT - SUBJECTIVE AND OBJECTIVE BOX
HPI:  72yo male with pmh HTN, HL, ESRD on dialysis (TTS), COPD presents with sob and occasional cough x 2 weeks but worse last night. denies cp, fever, palpitaitons. - patient last had hemodialysis yesterday but no help with the short of breath - associated with productive cough whitish in color with  no chest or fever. Palliative care consult called for goals of care conversation and advance care planning.    PERTINENT PMH REVIEWED:  [x ] YES [ ] NO           SOCIAL HISTORY:  Significant other/partner:  [ ] YES  [ ] NO            Children:  [x ] YES  [ ] NO                   Restorationist/Spirituality:  Substance hx:  [ ] YES   [ ] NO           Tobacco hx:  [x ] YES  [ ] NO             Alcohol hx: [ ] YES  [ ] NO        Home Opioid hx:  [ ] YES  [ ] NO   Living Situation: [X ] Home  [ ] Long term care  [ ] Rehab    FAMILY HISTORY:  No pertinent family history in first degree relatives    [x ] Family history non contributory     BASELINE ADLs (prior to admission):  Independent [X ] moderately [ ] fully   Dependent   [ ] moderately [ ] fully    Code Status:   FULL CODE                   MOLST  [ ] YES [x ] NO    Living Will  [ ] YES [x ] NO    Health Care Proxy [ ] YES  [x ] NO      [ ] Surrogate  [X ] HCP  [ ] Guardian:    Yemi Bragg White                                                               Phone#:    Allergies  No Known Allergies    Intolerances    MEDICATIONS  (STANDING):  ALBUTerol/ipratropium for Nebulization 3 milliLiter(s) Nebulizer every 6 hours  aspirin  chewable 81 milliGRAM(s) Oral daily  buDESOnide 160 MICROgram(s)/formoterol 4.5 MICROgram(s) Inhaler 2 Puff(s) Inhalation two times a day  cinacalcet 30 milliGRAM(s) Oral daily  cloNIDine 0.1 milliGRAM(s) Oral every 12 hours  docusate sodium 100 milliGRAM(s) Oral two times a day  famotidine    Tablet 20 milliGRAM(s) Oral every 48 hours  heparin  Injectable 5000 Unit(s) SubCutaneous every 8 hours  methylPREDNISolone sodium succinate Injectable 40 milliGRAM(s) IV Push every 8 hours    MEDICATIONS  (PRN):  oxyCODONE    5 mG/acetaminophen 325 mG 1 Tablet(s) Oral every 6 hours PRN Moderate Pain (4 - 6)  polyethylene glycol 3350 17 Gram(s) Oral daily PRN Constipation    PRESENT SYMPTOMS:  Source: [ ] Patient   [X ] Family   [ ] Team     Pain: [ ] YES [X ] NO  Onset:                    Location:                          Duration:                 Character:            Aggravating factors:                        Relieving factors:    Radiation:              Timing:                             Severity:      Dyspnea: [X ] YES [ ] NO - Mild [ ]  Moderate [ ]  Severe [ ]    Anxiety: [X ] YES [ ] NO  Fatigue: [X ] YES [ ] NO   Nausea: [ ] YES [X ] NO  Loss of appetite: [ ] YES [X ] NO   Constipation: [ ] YES [X ] NO     Other Symptoms:  [X ] All other review of systems negative   [ ] Unable to obtain due to poor mentation     Does patient meet criteria for Severe Protein Calorie Malnutrition?  Yes [ ]  No [X ]  PPSV 30% or below [X ]  Anasarca [ ]  Albumin < 2 [ ] Catabolic State [ ] Poor nutritional intake [ ] Significant weight loss [ ]      Palliative Performance Status Version 2:  30 %  ECOG - 4     Vital Signs Last 24 Hrs  T(C): 36 (24 Oct 2018 11:12), Max: 36.7 (23 Oct 2018 13:45)  T(F): 96.8 (24 Oct 2018 11:12), Max: 98 (23 Oct 2018 13:45)  HR: 91 (24 Oct 2018 11:12) (82 - 98)  BP: 160/72 (24 Oct 2018 11:12) (144/75 - 160/72)  BP(mean): --  RR: 22 (24 Oct 2018 11:12) (15 - 22)  SpO2: 98% (24 Oct 2018 11:12) (94% - 100%)    Physical Exam:    General: [ X] Alert,  A&O x 3    [X ] lethargic   [ ] Agitated   [ ] Cachexia   HEENT: [ ] Normal   [X ] Dry mouth   [ ] ET Tube    [ ] Trach   Lungs: [ ] Clear [X ] Rhonchi  [ ] Crackles [ ] Wheezing [ ] Tachypnea  [ ] Audible excessive secretions    Cardiovascular:  [ ] Regular rate and rhythm  [ ] Irregular [X ] Tachycardia   [ ] Bradycardia   Abdomen: [ X] Soft  [ ] Distended  [X ] +BS  [X ] Non tender [ ] Tender  [ ]PEG   [ ] NGT   Last BM:     Genitourinary: [X ] Normal [ ] Incontinent   [ ] Oliguria/Anuria   [ ] Marc  Musculoskeletal:  [ ] Normal   [X ] Generalized weakness  [ ] Bedbound  [ ] Edema   Neurological: [X ] No focal deficits  [ ] Cognitive impairment     Skin: [X ] Normal   [ ] Pressure ulcers     LABS:                        10.0   6.52  )-----------( 152      ( 24 Oct 2018 06:37 )             33.7     10-24    141  |  103  |  41<H>  ----------------------------<  116<H>  4.7   |  27  |  7.80<H>    Ca    8.9      24 Oct 2018 06:37    I&O's Summary    23 Oct 2018 07:01  -  24 Oct 2018 07:00  --------------------------------------------------------  IN: 240 mL / OUT: 2500 mL / NET: -2260 mL    RADIOLOGY & ADDITIONAL STUDIES:    Referrals:  Hospice [ ]   Chaplaincy [X ]    Child Life [ ]   Social Work [X ]   Case Management [ ]   Holistic Therapy [ ]

## 2018-10-24 NOTE — PROGRESS NOTE ADULT - SUBJECTIVE AND OBJECTIVE BOX
Patient is a 73y old  Male who presents with a chief complaint of short of breath (24 Oct 2018 11:32)      INTERVAL HPI/OVERNIGHT EVENTS: no events, feels better    MEDICATIONS  (STANDING):  ALBUTerol/ipratropium for Nebulization 3 milliLiter(s) Nebulizer every 6 hours  aspirin  chewable 81 milliGRAM(s) Oral daily  buDESOnide 160 MICROgram(s)/formoterol 4.5 MICROgram(s) Inhaler 2 Puff(s) Inhalation two times a day  cinacalcet 30 milliGRAM(s) Oral daily  cloNIDine 0.1 milliGRAM(s) Oral every 12 hours  docusate sodium 100 milliGRAM(s) Oral two times a day  famotidine    Tablet 20 milliGRAM(s) Oral every 48 hours  heparin  Injectable 5000 Unit(s) SubCutaneous every 8 hours  methylPREDNISolone sodium succinate Injectable 40 milliGRAM(s) IV Push every 8 hours    MEDICATIONS  (PRN):  oxyCODONE    5 mG/acetaminophen 325 mG 1 Tablet(s) Oral every 6 hours PRN Moderate Pain (4 - 6)  polyethylene glycol 3350 17 Gram(s) Oral daily PRN Constipation      Allergies    No Known Allergies    Intolerances        REVIEW OF SYSTEMS:  CONSTITUTIONAL: No fever, weight loss, or fatigue  EYES: No eye pain, visual disturbances, or discharge  ENMT:  No difficulty hearing, tinnitus, vertigo; No sinus or throat pain  NECK: No pain or stiffness  BREASTS: No pain, masses, or nipple discharge  RESPIRATORY: No cough, wheezing, chills or hemoptysis; No shortness of breath  CARDIOVASCULAR: No chest pain, palpitations, dizziness, or leg swelling  GASTROINTESTINAL: No abdominal or epigastric pain. No nausea, vomiting, or hematemesis; No diarrhea or constipation. No melena or hematochezia.  GENITOURINARY: No dysuria, frequency, hematuria, or incontinence  NEUROLOGICAL: No headaches, memory loss, loss of strength, numbness, or tremors  SKIN: No itching, burning, rashes, or lesions   LYMPH NODES: No enlarged glands  ENDOCRINE: No heat or cold intolerance; No hair loss  MUSCULOSKELETAL: No joint pain or swelling; No muscle, back, or extremity pain  PSYCHIATRIC: No depression, anxiety, mood swings, or difficulty sleeping  HEME/LYMPH: No easy bruising, or bleeding gums  ALLERGY AND IMMUNOLOGIC: No hives or eczema    Vital Signs Last 24 Hrs  T(C): 36 (24 Oct 2018 11:12), Max: 36.7 (23 Oct 2018 13:45)  T(F): 96.8 (24 Oct 2018 11:12), Max: 98 (23 Oct 2018 13:45)  HR: 85 (24 Oct 2018 11:57) (82 - 98)  BP: 160/72 (24 Oct 2018 11:12) (144/75 - 160/72)  BP(mean): --  RR: 22 (24 Oct 2018 11:12) (15 - 22)  SpO2: 96% (24 Oct 2018 11:57) (94% - 100%)    PHYSICAL EXAM:  GENERAL: NAD, well-groomed, well-developed  HEAD:  Atraumatic, Normocephalic  EYES: EOMI, PERRLA, conjunctiva and sclera clear  ENMT: No tonsillar erythema, exudates, or enlargement; Moist mucous membranes, Good dentition, No lesions  NECK: Supple, No JVD, Normal thyroid  NERVOUS SYSTEM:  Alert & Oriented X3, Good concentration; Motor Strength 5/5 B/L upper and lower extremities; DTRs 2+ intact and symmetric  CHEST/LUNG: Clear to percussion bilaterally; No rales, rhonchi, wheezing, or rubs  HEART: Regular rate and rhythm; No murmurs, rubs, or gallops  ABDOMEN: Soft, Nontender, Nondistended; Bowel sounds present  EXTREMITIES:  2+ Peripheral Pulses, No clubbing, cyanosis, or edema  LYMPH: No lymphadenopathy noted  SKIN: No rashes or lesions    LABS:                        10.0   6.52  )-----------( 152      ( 24 Oct 2018 06:37 )             33.7     10-24    141  |  103  |  41<H>  ----------------------------<  116<H>  4.7   |  27  |  7.80<H>    Ca    8.9      24 Oct 2018 06:37          CAPILLARY BLOOD GLUCOSE          RADIOLOGY & ADDITIONAL TESTS:    Imaging Personally Reviewed:  [ X] YES  [ ] NO    Consultant(s) Notes Reviewed:  [ X] YES  [ ] NO    Care Discussed with Consultants/Other Providers [X ] YES  [ ] NO

## 2018-10-24 NOTE — DISCHARGE NOTE ADULT - PLAN OF CARE
Avoid future exacerbations Take medications as prescribed  Follow up with PMD, pulmonologist Continue maintenance HD  Follow up with nephrologist Continue medications as prescribed  Follow up with PMD  Low-sodium diet  AHA Recipes - [email or web address] Stable  Continue medications as prescribed

## 2018-10-24 NOTE — PROGRESS NOTE ADULT - SUBJECTIVE AND OBJECTIVE BOX
No SOB or chest pain;     MEDICATIONS  (STANDING):  ALBUTerol/ipratropium for Nebulization 3 milliLiter(s) Nebulizer every 6 hours  aspirin  chewable 81 milliGRAM(s) Oral daily  buDESOnide 160 MICROgram(s)/formoterol 4.5 MICROgram(s) Inhaler 2 Puff(s) Inhalation two times a day  cinacalcet 30 milliGRAM(s) Oral daily  cloNIDine 0.1 milliGRAM(s) Oral every 12 hours  docusate sodium 100 milliGRAM(s) Oral two times a day  famotidine    Tablet 20 milliGRAM(s) Oral every 48 hours  heparin  Injectable 5000 Unit(s) SubCutaneous every 8 hours  methylPREDNISolone sodium succinate Injectable 40 milliGRAM(s) IV Push every 8 hours    MEDICATIONS  (PRN):  oxyCODONE    5 mG/acetaminophen 325 mG 1 Tablet(s) Oral every 6 hours PRN Moderate Pain (4 - 6)  polyethylene glycol 3350 17 Gram(s) Oral daily PRN Constipation      10-23-18 @ 07:01  -  10-24-18 @ 07:00  --------------------------------------------------------  IN: 240 mL / OUT: 2500 mL / NET: -2260 mL      PHYSICAL EXAM:      T(C): 36 (10-24-18 @ 11:12), Max: 36.1 (10-23-18 @ 23:40)  HR: 85 (10-24-18 @ 11:57) (85 - 98)  BP: 160/72 (10-24-18 @ 11:12) (144/75 - 160/72)  RR: 22 (10-24-18 @ 11:12) (15 - 22)  SpO2: 96% (10-24-18 @ 11:57) (94% - 99%)  Wt(kg): --  Respiratory: clear anteriorly, decreased BS at bases  Cardiovascular: S1 S2  Gastrointestinal: soft NT ND +BS  Extremities:   1 edema                              10.0   6.52  )-----------( 152      ( 24 Oct 2018 06:37 )             33.7     10-24    141  |  103  |  41<H>  ----------------------------<  116<H>  4.7   |  27  |  7.80<H>    Ca    8.9      24 Oct 2018 06:37            Assessment and Plan:    ESRD; respiratory status stable;  HD for tomorrow;

## 2018-10-24 NOTE — DISCHARGE NOTE ADULT - CARE PROVIDER_API CALL
Dr. Allen,   PMD  Phone: (   )    -  Fax: (   )    -    Maria Luisa Walker (MD), Medicine  [address] [facility]  [address] [place] [address]  Phone: [phone]  Fax: [phone]

## 2018-10-25 VITALS
SYSTOLIC BLOOD PRESSURE: 160 MMHG | DIASTOLIC BLOOD PRESSURE: 69 MMHG | RESPIRATION RATE: 16 BRPM | TEMPERATURE: 98 F | OXYGEN SATURATION: 95 % | HEART RATE: 87 BPM

## 2018-10-25 LAB
ANION GAP SERPL CALC-SCNC: 14 MMOL/L — SIGNIFICANT CHANGE UP (ref 5–17)
BUN SERPL-MCNC: 68 MG/DL — HIGH (ref 7–23)
CALCIUM SERPL-MCNC: 8.1 MG/DL — LOW (ref 8.5–10.1)
CHLORIDE SERPL-SCNC: 99 MMOL/L — SIGNIFICANT CHANGE UP (ref 96–108)
CO2 SERPL-SCNC: 26 MMOL/L — SIGNIFICANT CHANGE UP (ref 22–31)
CREAT SERPL-MCNC: 10.5 MG/DL — HIGH (ref 0.5–1.3)
GLUCOSE SERPL-MCNC: 177 MG/DL — HIGH (ref 70–99)
HCT VFR BLD CALC: 31.5 % — LOW (ref 39–50)
HGB BLD-MCNC: 9.5 G/DL — LOW (ref 13–17)
MCHC RBC-ENTMCNC: 23.8 PG — LOW (ref 27–34)
MCHC RBC-ENTMCNC: 30.2 GM/DL — LOW (ref 32–36)
MCV RBC AUTO: 78.9 FL — LOW (ref 80–100)
NRBC # BLD: 0 /100 WBCS — SIGNIFICANT CHANGE UP (ref 0–0)
PLATELET # BLD AUTO: 161 K/UL — SIGNIFICANT CHANGE UP (ref 150–400)
POTASSIUM SERPL-MCNC: 4.1 MMOL/L — SIGNIFICANT CHANGE UP (ref 3.5–5.3)
POTASSIUM SERPL-SCNC: 4.1 MMOL/L — SIGNIFICANT CHANGE UP (ref 3.5–5.3)
RBC # BLD: 3.99 M/UL — LOW (ref 4.2–5.8)
RBC # FLD: 16.4 % — HIGH (ref 10.3–14.5)
SODIUM SERPL-SCNC: 139 MMOL/L — SIGNIFICANT CHANGE UP (ref 135–145)
WBC # BLD: 5.49 K/UL — SIGNIFICANT CHANGE UP (ref 3.8–10.5)
WBC # FLD AUTO: 5.49 K/UL — SIGNIFICANT CHANGE UP (ref 3.8–10.5)

## 2018-10-25 PROCEDURE — 99239 HOSP IP/OBS DSCHRG MGMT >30: CPT

## 2018-10-25 RX ORDER — ALBUTEROL 90 UG/1
2 AEROSOL, METERED ORAL
Qty: 1 | Refills: 0
Start: 2018-10-25 | End: 2018-11-23

## 2018-10-25 RX ORDER — ALBUTEROL 90 UG/1
2 AEROSOL, METERED ORAL EVERY 6 HOURS
Qty: 0 | Refills: 0 | Status: COMPLETED | OUTPATIENT
Start: 2018-10-25 | End: 2019-09-23

## 2018-10-25 RX ORDER — TIOTROPIUM BROMIDE 18 UG/1
1 CAPSULE ORAL; RESPIRATORY (INHALATION) DAILY
Qty: 0 | Refills: 0 | Status: DISCONTINUED | OUTPATIENT
Start: 2018-10-25 | End: 2018-10-25

## 2018-10-25 RX ORDER — ALBUTEROL 90 UG/1
2 AEROSOL, METERED ORAL EVERY 6 HOURS
Qty: 0 | Refills: 0 | Status: DISCONTINUED | OUTPATIENT
Start: 2018-10-25 | End: 2018-10-25

## 2018-10-25 RX ADMIN — HEPARIN SODIUM 5000 UNIT(S): 5000 INJECTION INTRAVENOUS; SUBCUTANEOUS at 05:56

## 2018-10-25 RX ADMIN — Medication 100 MILLIGRAM(S): at 05:57

## 2018-10-25 RX ADMIN — Medication 20 MILLIGRAM(S): at 05:56

## 2018-10-25 RX ADMIN — Medication 3 MILLILITER(S): at 05:07

## 2018-10-25 RX ADMIN — BUDESONIDE AND FORMOTEROL FUMARATE DIHYDRATE 2 PUFF(S): 160; 4.5 AEROSOL RESPIRATORY (INHALATION) at 05:57

## 2018-10-25 NOTE — PROGRESS NOTE ADULT - REASON FOR ADMISSION
short of breath

## 2018-10-25 NOTE — PROGRESS NOTE ADULT - SUBJECTIVE AND OBJECTIVE BOX
· Subjective and Objective: 	  HPI:  74yo male with pmh HTN, HL, ESRD on dialysis (TTS), COPD presents with sob and occasional cough x 2 weeks but worse last night. denies cp, fever, palpitaitons. - patient last had hemodialysis yesterday but no help with the short of breath - associated with productive cough whitish in color with  no chest or fever. Palliative care consult called for goals of care conversation and advance care planning.    PERTINENT PMH REVIEWED:  [x ] YES [ ] NO           SOCIAL HISTORY:  Significant other/partner:  [ ] YES  [ ] NO            Children:  [x ] YES  [ ] NO                   Alevism/Spirituality:  Substance hx:  [ ] YES   [ ] NO           Tobacco hx:  [x ] YES  [ ] NO             Alcohol hx: [ ] YES  [ ] NO        Home Opioid hx:  [ ] YES  [ ] NO   Living Situation: [X ] Home  [ ] Long term care  [ ] Rehab    FAMILY HISTORY:  No pertinent family history in first degree relatives    [x ] Family history non contributory     BASELINE ADLs (prior to admission):  Independent [X ] moderately [ ] fully   Dependent   [ ] moderately [ ] fully    Code Status:   FULL CODE                   MOLST  [ ] YES [x ] NO    Living Will  [ ] YES [x ] NO    Health Care Proxy [ ] YES  [x ] NO      [ ] Surrogate  [X ] HCP  [ ] Guardian:    Yemi Bragg White                                                               Phone#:    Allergies  No Known Allergies    Intolerances    MEDICATIONS  (STANDING):  ALBUTerol/ipratropium for Nebulization 3 milliLiter(s) Nebulizer every 6 hours  aspirin  chewable 81 milliGRAM(s) Oral daily  buDESOnide 160 MICROgram(s)/formoterol 4.5 MICROgram(s) Inhaler 2 Puff(s) Inhalation two times a day  cinacalcet 30 milliGRAM(s) Oral daily  cloNIDine 0.1 milliGRAM(s) Oral every 12 hours  docusate sodium 100 milliGRAM(s) Oral two times a day  famotidine    Tablet 20 milliGRAM(s) Oral every 48 hours  heparin  Injectable 5000 Unit(s) SubCutaneous every 8 hours  methylPREDNISolone sodium succinate Injectable 40 milliGRAM(s) IV Push every 8 hours    MEDICATIONS  (PRN):  oxyCODONE    5 mG/acetaminophen 325 mG 1 Tablet(s) Oral every 6 hours PRN Moderate Pain (4 - 6)  polyethylene glycol 3350 17 Gram(s) Oral daily PRN Constipation    PRESENT SYMPTOMS:  Source: [ ] Patient   [X ] Family   [ ] Team     Pain: [ ] YES [X ] NO  Onset:                    Location:                          Duration:                 Character:            Aggravating factors:                        Relieving factors:    Radiation:              Timing:                             Severity:      Dyspnea: [X ] YES [ ] NO - Mild [ ]  Moderate [ ]  Severe [ ]    Anxiety: [X ] YES [ ] NO  Fatigue: [X ] YES [ ] NO   Nausea: [ ] YES [X ] NO  Loss of appetite: [ ] YES [X ] NO   Constipation: [ ] YES [X ] NO     Other Symptoms:  [X ] All other review of systems negative   [ ] Unable to obtain due to poor mentation     Does patient meet criteria for Severe Protein Calorie Malnutrition?  Yes [ ]  No [X ]  PPSV 30% or below [X ]  Anasarca [ ]  Albumin < 2 [ ] Catabolic State [ ] Poor nutritional intake [ ] Significant weight loss [ ]      Palliative Performance Status Version 2:  30 %  ECOG - 4     Vital Signs Last 24 Hrs  T(C): 36 (24 Oct 2018 11:12), Max: 36.7 (23 Oct 2018 13:45)  T(F): 96.8 (24 Oct 2018 11:12), Max: 98 (23 Oct 2018 13:45)  HR: 91 (24 Oct 2018 11:12) (82 - 98)  BP: 160/72 (24 Oct 2018 11:12) (144/75 - 160/72)  BP(mean): --  RR: 22 (24 Oct 2018 11:12) (15 - 22)  SpO2: 98% (24 Oct 2018 11:12) (94% - 100%)    Physical Exam:    General: [ X] Alert,  A&O x 3    [X ] lethargic   [ ] Agitated   [ ] Cachexia   HEENT: [ ] Normal   [X ] Dry mouth   [ ] ET Tube    [ ] Trach   Lungs: [ ] Clear [X ] Rhonchi  [ ] Crackles [ ] Wheezing [ ] Tachypnea  [ ] Audible excessive secretions    Cardiovascular:  [ ] Regular rate and rhythm  [ ] Irregular [X ] Tachycardia   [ ] Bradycardia   Abdomen: [ X] Soft  [ ] Distended  [X ] +BS  [X ] Non tender [ ] Tender  [ ]PEG   [ ] NGT   Last BM:     Genitourinary: [X ] Normal [ ] Incontinent   [ ] Oliguria/Anuria   [ ] Marc  Musculoskeletal:  [ ] Normal   [X ] Generalized weakness  [ ] Bedbound  [ ] Edema   Neurological: [X ] No focal deficits  [ ] Cognitive impairment     Skin: [X ] Normal   [ ] Pressure ulcers     LABS:                        10.0   6.52  )-----------( 152      ( 24 Oct 2018 06:37 )             33.7     10-24    141  |  103  |  41<H>  ----------------------------<  116<H>  4.7   |  27  |  7.80<H>    Ca    8.9      24 Oct 2018 06:37    I&O's Summary    23 Oct 2018 07:01  -  24 Oct 2018 07:00  --------------------------------------------------------  IN: 240 mL / OUT: 2500 mL / NET: -2260 mL    RADIOLOGY & ADDITIONAL STUDIES:    Referrals:  Hospice [ ]   Chaplaincy [X ]    Child Life [ ]   Social Work [X ]   Case Management [ ]   Holistic Therapy [ ]     a/p suggest dnr/dni and sx bisi   son disagrees

## 2018-10-25 NOTE — PROGRESS NOTE ADULT - SUBJECTIVE AND OBJECTIVE BOX
Subjective: seen on HD. No complaints.       MEDICATIONS  (STANDING):  ALBUTerol    90 MICROgram(s) HFA Inhaler 2 Puff(s) Inhalation every 6 hours  aspirin  chewable 81 milliGRAM(s) Oral daily  buDESOnide 160 MICROgram(s)/formoterol 4.5 MICROgram(s) Inhaler 2 Puff(s) Inhalation two times a day  cinacalcet 30 milliGRAM(s) Oral daily  cloNIDine 0.1 milliGRAM(s) Oral every 12 hours  docusate sodium 100 milliGRAM(s) Oral two times a day  famotidine    Tablet 20 milliGRAM(s) Oral every 48 hours  heparin  Injectable 5000 Unit(s) SubCutaneous every 8 hours  tiotropium 18 MICROgram(s) Capsule 1 Capsule(s) Inhalation daily    MEDICATIONS  (PRN):  oxyCODONE    5 mG/acetaminophen 325 mG 1 Tablet(s) Oral every 6 hours PRN Moderate Pain (4 - 6)  polyethylene glycol 3350 17 Gram(s) Oral daily PRN Constipation          T(C): 36.7 (10-25-18 @ 09:30), Max: 36.7 (10-25-18 @ 09:30)  HR: 92 (10-25-18 @ 09:30) (83 - 98)  BP: 125/55 (10-25-18 @ 09:30) (114/57 - 154/80)  RR: 16 (10-25-18 @ 09:30) (15 - 18)  SpO2: 99% (10-25-18 @ 09:30) (93% - 99%)  Wt(kg): --        I&O's Detail           PHYSICAL EXAM:    GENERAL: comfortable  EYES: EOMI, PERRLA, conjunctiva and sclera clear  NECK: Supple, no inc in JVP  CHEST/LUNG: Clear  HEART: S1S2  ABDOMEN: Soft, Nontender, Nondistended; Bowel sounds present  EXTREMITIES:  no edema  NEURO: no asterixis      LABS:  CBC Full  -  ( 25 Oct 2018 09:55 )  WBC Count : 5.49 K/uL  Hemoglobin : 9.5 g/dL  Hematocrit : 31.5 %  Platelet Count - Automated : 161 K/uL  Mean Cell Volume : 78.9 fl  Mean Cell Hemoglobin : 23.8 pg  Mean Cell Hemoglobin Concentration : 30.2 gm/dL  Auto Neutrophil # : x  Auto Lymphocyte # : x  Auto Monocyte # : x  Auto Eosinophil # : x  Auto Basophil # : x  Auto Neutrophil % : x  Auto Lymphocyte % : x  Auto Monocyte % : x  Auto Eosinophil % : x  Auto Basophil % : x    10-25    139  |  99  |  68<H>  ----------------------------<  177<H>  4.1   |  26  |  10.50<H>    Ca    8.1<L>      25 Oct 2018 09:55            Impression:  * HD dependent ESRD  * COPD exacerbation      Recommendations:   Stable HD as Rxed. UF goal 2.5-3kg.

## 2018-10-25 NOTE — PROGRESS NOTE ADULT - SUBJECTIVE AND OBJECTIVE BOX
INTERVAL HPI:   74yo male with  HTN, HLD, ESRD on dialysis (TTS), COPD and BPH,  presents with sob and occasional cough x 2 weeks but worse last night. denies cp, fever, palpitaitons. - patient last had hemodialysis yesterday but no help with the short of breath - asociated with productive cough whitish in color with  no chest or fever . Admitted with COPD exacerbation.  Smoked close to 40 years, quit 20 years ago.    OVERNIGHT EVENTS:  Seen in dialysis, awake and comfortable.    Vital Signs Last 24 Hrs  T(C): 36.4 (25 Oct 2018 05:06), Max: 36.4 (25 Oct 2018 05:06)  T(F): 97.5 (25 Oct 2018 05:06), Max: 97.5 (25 Oct 2018 05:06)  HR: 83 (25 Oct 2018 05:09) (83 - 98)  BP: 114/57 (25 Oct 2018 05:06) (114/57 - 160/72)  BP(mean): --  RR: 15 (25 Oct 2018 05:06) (15 - 22)  SpO2: 97% (25 Oct 2018 05:09) (93% - 99%)    PHYSICAL EXAM:  GEN:         Awake, responsive and comfortable.  HEENT:    Normal.    RESP:       no wheezing.  CVS:          Regular rate and rhythm.   ABD:         Soft, non-tender, non-distended;     MEDICATIONS  (STANDING):  ALBUTerol    90 MICROgram(s) HFA Inhaler 2 Puff(s) Inhalation every 6 hours  aspirin  chewable 81 milliGRAM(s) Oral daily  buDESOnide 160 MICROgram(s)/formoterol 4.5 MICROgram(s) Inhaler 2 Puff(s) Inhalation two times a day  cinacalcet 30 milliGRAM(s) Oral daily  cloNIDine 0.1 milliGRAM(s) Oral every 12 hours  docusate sodium 100 milliGRAM(s) Oral two times a day  famotidine    Tablet 20 milliGRAM(s) Oral every 48 hours  heparin  Injectable 5000 Unit(s) SubCutaneous every 8 hours  methylPREDNISolone sodium succinate Injectable 20 milliGRAM(s) IV Push every 8 hours  tiotropium 18 MICROgram(s) Capsule 1 Capsule(s) Inhalation daily    MEDICATIONS  (PRN):  oxyCODONE    5 mG/acetaminophen 325 mG 1 Tablet(s) Oral every 6 hours PRN Moderate Pain (4 - 6)  polyethylene glycol 3350 17 Gram(s) Oral daily PRN Constipation    LABS:                        9.5    5.49  )-----------( 161      ( 25 Oct 2018 09:55 )             31.5     10-25    139  |  99  |  68<H>  ----------------------------<  177<H>  4.1   |  26  |  10.50<H>    Ca    8.1<L>      25 Oct 2018 09:55    ASSESSMENT AND PLAN:  ·	SOB.  ·	Acute COPD exacerbation.  ·	ESRD.  ·	HTN  ·	Anemia.  ·	HLD.  ·	BPH.    Clinically better, will dc steroids.  Continue nebulizer and Symbicort.

## 2018-10-30 DIAGNOSIS — Z51.5 ENCOUNTER FOR PALLIATIVE CARE: ICD-10-CM

## 2018-10-30 DIAGNOSIS — Z79.891 LONG TERM (CURRENT) USE OF OPIATE ANALGESIC: ICD-10-CM

## 2018-10-30 DIAGNOSIS — D64.9 ANEMIA, UNSPECIFIED: ICD-10-CM

## 2018-10-30 DIAGNOSIS — Z79.2 LONG TERM (CURRENT) USE OF ANTIBIOTICS: ICD-10-CM

## 2018-10-30 DIAGNOSIS — Z94.0 KIDNEY TRANSPLANT STATUS: ICD-10-CM

## 2018-10-30 DIAGNOSIS — Z99.2 DEPENDENCE ON RENAL DIALYSIS: ICD-10-CM

## 2018-10-30 DIAGNOSIS — I12.0 HYPERTENSIVE CHRONIC KIDNEY DISEASE WITH STAGE 5 CHRONIC KIDNEY DISEASE OR END STAGE RENAL DISEASE: ICD-10-CM

## 2018-10-30 DIAGNOSIS — R06.02 SHORTNESS OF BREATH: ICD-10-CM

## 2018-10-30 DIAGNOSIS — Z79.51 LONG TERM (CURRENT) USE OF INHALED STEROIDS: ICD-10-CM

## 2018-10-30 DIAGNOSIS — Z28.21 IMMUNIZATION NOT CARRIED OUT BECAUSE OF PATIENT REFUSAL: ICD-10-CM

## 2018-10-30 DIAGNOSIS — Z79.82 LONG TERM (CURRENT) USE OF ASPIRIN: ICD-10-CM

## 2018-10-30 DIAGNOSIS — N40.0 BENIGN PROSTATIC HYPERPLASIA WITHOUT LOWER URINARY TRACT SYMPTOMS: ICD-10-CM

## 2018-10-30 DIAGNOSIS — Z90.49 ACQUIRED ABSENCE OF OTHER SPECIFIED PARTS OF DIGESTIVE TRACT: ICD-10-CM

## 2018-10-30 DIAGNOSIS — J44.1 CHRONIC OBSTRUCTIVE PULMONARY DISEASE WITH (ACUTE) EXACERBATION: ICD-10-CM

## 2018-10-30 DIAGNOSIS — Z79.899 OTHER LONG TERM (CURRENT) DRUG THERAPY: ICD-10-CM

## 2018-10-30 DIAGNOSIS — N18.6 END STAGE RENAL DISEASE: ICD-10-CM

## 2018-10-30 DIAGNOSIS — Z87.891 PERSONAL HISTORY OF NICOTINE DEPENDENCE: ICD-10-CM

## 2018-10-30 DIAGNOSIS — Z79.1 LONG TERM (CURRENT) USE OF NON-STEROIDAL ANTI-INFLAMMATORIES (NSAID): ICD-10-CM

## 2018-10-30 DIAGNOSIS — E78.5 HYPERLIPIDEMIA, UNSPECIFIED: ICD-10-CM

## 2018-11-14 ENCOUNTER — APPOINTMENT (OUTPATIENT)
Dept: PULMONOLOGY | Facility: CLINIC | Age: 73
End: 2018-11-14
Payer: MEDICARE

## 2018-11-14 VITALS
RESPIRATION RATE: 16 BRPM | HEIGHT: 74.02 IN | OXYGEN SATURATION: 100 % | WEIGHT: 170 LBS | HEART RATE: 93 BPM | TEMPERATURE: 98 F | BODY MASS INDEX: 21.82 KG/M2 | SYSTOLIC BLOOD PRESSURE: 179 MMHG | DIASTOLIC BLOOD PRESSURE: 83 MMHG

## 2018-11-14 DIAGNOSIS — I10 ESSENTIAL (PRIMARY) HYPERTENSION: ICD-10-CM

## 2018-11-14 DIAGNOSIS — N18.6 END STAGE RENAL DISEASE: ICD-10-CM

## 2018-11-14 DIAGNOSIS — Z99.2 END STAGE RENAL DISEASE: ICD-10-CM

## 2018-11-14 DIAGNOSIS — J44.9 CHRONIC OBSTRUCTIVE PULMONARY DISEASE, UNSPECIFIED: ICD-10-CM

## 2018-11-14 DIAGNOSIS — E78.5 HYPERLIPIDEMIA, UNSPECIFIED: ICD-10-CM

## 2018-11-14 PROCEDURE — 94726 PLETHYSMOGRAPHY LUNG VOLUMES: CPT

## 2018-11-14 PROCEDURE — 94729 DIFFUSING CAPACITY: CPT

## 2018-11-14 PROCEDURE — 99204 OFFICE O/P NEW MOD 45 MIN: CPT | Mod: 25

## 2018-11-14 PROCEDURE — 94060 EVALUATION OF WHEEZING: CPT

## 2018-11-14 RX ORDER — ALBUTEROL SULFATE 2.5 MG/3ML
(2.5 MG/3ML) SOLUTION RESPIRATORY (INHALATION)
Refills: 0 | Status: ACTIVE | COMMUNITY

## 2018-11-16 RX ORDER — BUDESONIDE AND FORMOTEROL FUMARATE DIHYDRATE 160; 4.5 UG/1; UG/1
160-4.5 AEROSOL RESPIRATORY (INHALATION)
Refills: 0 | Status: DISCONTINUED | COMMUNITY
End: 2018-11-16

## 2018-11-16 RX ORDER — TIOTROPIUM BROMIDE 18 UG/1
18 CAPSULE ORAL; RESPIRATORY (INHALATION) DAILY
Qty: 30 | Refills: 11 | Status: ACTIVE | COMMUNITY
Start: 2018-11-16 | End: 1900-01-01

## 2018-11-16 RX ORDER — BUDESONIDE AND FORMOTEROL FUMARATE DIHYDRATE 160; 4.5 UG/1; UG/1
160-4.5 AEROSOL RESPIRATORY (INHALATION)
Qty: 1 | Refills: 11 | Status: ACTIVE | COMMUNITY
Start: 2018-11-16 | End: 1900-01-01

## 2018-11-16 RX ORDER — FLUTICASONE FUROATE, UMECLIDINIUM BROMIDE AND VILANTEROL TRIFENATATE 100; 62.5; 25 UG/1; UG/1; UG/1
100-62.5-25 POWDER RESPIRATORY (INHALATION) DAILY
Qty: 1 | Refills: 11 | Status: DISCONTINUED | COMMUNITY
Start: 2018-11-14 | End: 2018-11-16

## 2018-11-20 ENCOUNTER — APPOINTMENT (OUTPATIENT)
Dept: PULMONOLOGY | Facility: CLINIC | Age: 73
End: 2018-11-20

## 2019-04-05 NOTE — ED ADULT NURSE NOTE - GASTROINTESTINAL WDL
"Pt reports SI to RN and to MD. States he has a plan to jump off a bridge, and that he climbed up to the top of one and then got down, but still has a plan to follow through. Also reports feeling \"more suicidal than ever.\" Legal Hold initiated. Pt is high on SI scale, 1:1 sitter outside room. Belongings in bags, checked by security. In locker #3    $72, debit card, [place] license and ID (x2 cards) checked into safekeeping by security.  " Abdomen soft, nontender, nondistended, bowel sounds present in all 4 quadrants.

## 2019-06-17 ENCOUNTER — EMERGENCY (EMERGENCY)
Facility: HOSPITAL | Age: 74
LOS: 0 days | Discharge: HOME | End: 2019-06-17
Attending: EMERGENCY MEDICINE
Payer: COMMERCIAL

## 2019-06-17 VITALS
RESPIRATION RATE: 18 BRPM | WEIGHT: 179.9 LBS | SYSTOLIC BLOOD PRESSURE: 145 MMHG | OXYGEN SATURATION: 99 % | TEMPERATURE: 98 F | HEART RATE: 94 BPM | DIASTOLIC BLOOD PRESSURE: 68 MMHG | HEIGHT: 73 IN

## 2019-06-17 VITALS
HEART RATE: 95 BPM | RESPIRATION RATE: 19 BRPM | DIASTOLIC BLOOD PRESSURE: 65 MMHG | TEMPERATURE: 97 F | SYSTOLIC BLOOD PRESSURE: 134 MMHG | OXYGEN SATURATION: 98 %

## 2019-06-17 DIAGNOSIS — R06.02 SHORTNESS OF BREATH: ICD-10-CM

## 2019-06-17 DIAGNOSIS — K57.32 DIVERTICULITIS OF LARGE INTESTINE WITHOUT PERFORATION OR ABSCESS WITHOUT BLEEDING: ICD-10-CM

## 2019-06-17 DIAGNOSIS — R42 DIZZINESS AND GIDDINESS: ICD-10-CM

## 2019-06-17 DIAGNOSIS — N40.0 BENIGN PROSTATIC HYPERPLASIA WITHOUT LOWER URINARY TRACT SYMPTOMS: ICD-10-CM

## 2019-06-17 DIAGNOSIS — Z94.0 KIDNEY TRANSPLANT STATUS: ICD-10-CM

## 2019-06-17 DIAGNOSIS — I12.0 HYPERTENSIVE CHRONIC KIDNEY DISEASE WITH STAGE 5 CHRONIC KIDNEY DISEASE OR END STAGE RENAL DISEASE: ICD-10-CM

## 2019-06-17 DIAGNOSIS — Z79.82 LONG TERM (CURRENT) USE OF ASPIRIN: ICD-10-CM

## 2019-06-17 DIAGNOSIS — I35.9 NONRHEUMATIC AORTIC VALVE DISORDER, UNSPECIFIED: ICD-10-CM

## 2019-06-17 DIAGNOSIS — R05 COUGH: ICD-10-CM

## 2019-06-17 DIAGNOSIS — E78.5 HYPERLIPIDEMIA, UNSPECIFIED: ICD-10-CM

## 2019-06-17 DIAGNOSIS — J44.9 CHRONIC OBSTRUCTIVE PULMONARY DISEASE, UNSPECIFIED: ICD-10-CM

## 2019-06-17 DIAGNOSIS — Z86.711 PERSONAL HISTORY OF PULMONARY EMBOLISM: ICD-10-CM

## 2019-06-17 DIAGNOSIS — N18.6 END STAGE RENAL DISEASE: ICD-10-CM

## 2019-06-17 DIAGNOSIS — Z87.891 PERSONAL HISTORY OF NICOTINE DEPENDENCE: ICD-10-CM

## 2019-06-17 LAB
ALBUMIN SERPL ELPH-MCNC: 3.2 G/DL — LOW (ref 3.3–5)
ALP SERPL-CCNC: 71 U/L — SIGNIFICANT CHANGE UP (ref 40–120)
ALT FLD-CCNC: 8 U/L — LOW (ref 12–78)
ANION GAP SERPL CALC-SCNC: 6 MMOL/L — SIGNIFICANT CHANGE UP (ref 5–17)
APTT BLD: 28.9 SEC — SIGNIFICANT CHANGE UP (ref 27.5–36.3)
AST SERPL-CCNC: 5 U/L — LOW (ref 15–37)
BILIRUB SERPL-MCNC: 0.3 MG/DL — SIGNIFICANT CHANGE UP (ref 0.2–1.2)
BUN SERPL-MCNC: 27 MG/DL — HIGH (ref 7–23)
CALCIUM SERPL-MCNC: 10.3 MG/DL — HIGH (ref 8.5–10.1)
CHLORIDE SERPL-SCNC: 103 MMOL/L — SIGNIFICANT CHANGE UP (ref 96–108)
CO2 SERPL-SCNC: 33 MMOL/L — HIGH (ref 22–31)
CREAT SERPL-MCNC: 9.01 MG/DL — HIGH (ref 0.5–1.3)
D DIMER BLD IA.RAPID-MCNC: 1020 NG/ML DDU — HIGH
GLUCOSE SERPL-MCNC: 99 MG/DL — SIGNIFICANT CHANGE UP (ref 70–99)
HCT VFR BLD CALC: 28.5 % — LOW (ref 39–50)
HGB BLD-MCNC: 8.4 G/DL — LOW (ref 13–17)
INR BLD: 0.98 RATIO — SIGNIFICANT CHANGE UP (ref 0.88–1.16)
LIDOCAIN IGE QN: 49 U/L — LOW (ref 73–393)
MAGNESIUM SERPL-MCNC: 3.4 MG/DL — HIGH (ref 1.6–2.6)
MCHC RBC-ENTMCNC: 25.1 PG — LOW (ref 27–34)
MCHC RBC-ENTMCNC: 29.5 GM/DL — LOW (ref 32–36)
MCV RBC AUTO: 85.1 FL — SIGNIFICANT CHANGE UP (ref 80–100)
NRBC # BLD: 0 /100 WBCS — SIGNIFICANT CHANGE UP (ref 0–0)
NT-PROBNP SERPL-SCNC: HIGH PG/ML (ref 0–450)
PLATELET # BLD AUTO: 166 K/UL — SIGNIFICANT CHANGE UP (ref 150–400)
POTASSIUM SERPL-MCNC: 4.4 MMOL/L — SIGNIFICANT CHANGE UP (ref 3.5–5.3)
POTASSIUM SERPL-SCNC: 4.4 MMOL/L — SIGNIFICANT CHANGE UP (ref 3.5–5.3)
PROT SERPL-MCNC: 7.3 GM/DL — SIGNIFICANT CHANGE UP (ref 6–8.3)
PROTHROM AB SERPL-ACNC: 11 SEC — SIGNIFICANT CHANGE UP (ref 10–12.9)
RBC # BLD: 3.35 M/UL — LOW (ref 4.2–5.8)
RBC # FLD: 17.3 % — HIGH (ref 10.3–14.5)
SODIUM SERPL-SCNC: 142 MMOL/L — SIGNIFICANT CHANGE UP (ref 135–145)
TROPONIN I SERPL-MCNC: 0.02 NG/ML — SIGNIFICANT CHANGE UP (ref 0.01–0.04)
WBC # BLD: 7.08 K/UL — SIGNIFICANT CHANGE UP (ref 3.8–10.5)
WBC # FLD AUTO: 7.08 K/UL — SIGNIFICANT CHANGE UP (ref 3.8–10.5)

## 2019-06-17 PROCEDURE — 99284 EMERGENCY DEPT VISIT MOD MDM: CPT

## 2019-06-17 PROCEDURE — 93010 ELECTROCARDIOGRAM REPORT: CPT

## 2019-06-17 PROCEDURE — 71045 X-RAY EXAM CHEST 1 VIEW: CPT | Mod: 26

## 2019-06-17 PROCEDURE — 71275 CT ANGIOGRAPHY CHEST: CPT | Mod: 26

## 2019-06-17 RX ORDER — FUROSEMIDE 40 MG
40 TABLET ORAL ONCE
Refills: 0 | Status: COMPLETED | OUTPATIENT
Start: 2019-06-17 | End: 2019-06-17

## 2019-06-17 RX ORDER — DIAZEPAM 5 MG
1 TABLET ORAL
Qty: 5 | Refills: 0
Start: 2019-06-17 | End: 2019-06-21

## 2019-06-17 RX ADMIN — Medication 40 MILLIGRAM(S): at 16:09

## 2019-06-17 NOTE — ED ADULT TRIAGE NOTE - CHIEF COMPLAINT QUOTE
pt complaining of shortness of breath times few days. history of copd and chf. History of ESRD on HD tue, thur, sat. pt complaining of shortness of breath times few days. history of copd and chf. History of ESRD on HD tue, thdemertius, sat. duoneb times 2 given by ems.

## 2019-06-17 NOTE — ED ADULT NURSE NOTE - ED STAT RN HANDOFF DETAILS
report received from QUENTIN saleh. no acute distress noted. waiting for ambulette. will continue to monitor.

## 2019-06-17 NOTE — ED ADULT NURSE NOTE - CHIEF COMPLAINT QUOTE
pt complaining of shortness of breath times few days. history of copd and chf. History of ESRD on HD tue, thur, sat.

## 2019-06-17 NOTE — ED PROVIDER NOTE - CARE PLAN
Principal Discharge DX:	SOB (shortness of breath)  Secondary Diagnosis:	ESRD (end stage renal disease)

## 2019-06-17 NOTE — ED PROVIDER NOTE - OBJECTIVE STATEMENT
73 yo male with ESRD on dialysis (Tues, Thurs, sat) presents with sob, and cough for three days. Patient denies chest pain, fever, abdominal pain. Compliant with dialysis.

## 2019-06-17 NOTE — ED ADULT NURSE NOTE - OBJECTIVE STATEMENT
pt a&ox3 complaining of SOB for a few days. history of COPD, HTN, CHF, ESRD( TTHSAT) with right AVF with positive Bruit/Thrill

## 2019-06-17 NOTE — ED PROVIDER NOTE - PSH
h/o  LUE A-V fistula    h/o AV fistula - left upper arm    H/O unilateral nephrectomy  9/2013  History of colon resection - April 2012    S/P kidney transplant  09/2013 in [place]

## 2019-06-17 NOTE — ED ADULT NURSE NOTE - NSIMPLEMENTINTERV_GEN_ALL_ED
Implemented All Universal Safety Interventions:  [place] to call system. Call bell, personal items and telephone within reach. Instruct patient to call for assistance. Room bathroom lighting operational. Non-slip footwear when patient is off stretcher. Physically safe environment: no spills, clutter or unnecessary equipment. Stretcher in lowest position, wheels locked, appropriate side rails in place.

## 2019-06-17 NOTE — ED PROVIDER NOTE - CLINICAL SUMMARY MEDICAL DECISION MAKING FREE TEXT BOX
Ct chest without PE or effusion, patient not hypoxic, will dc with dialysis tomorrow; return instructions provided, results provided to patient; case discussed with Dr. rosas

## 2020-01-08 NOTE — ED PROVIDER NOTE - PROGRESS NOTE DETAILS
Nemo Isbell  [address] [place] [id number]  Phone: [phone]  Fax: (   )    -  Established Patient  Follow Up Time: 2 weeks
Seen and cleared by Urology Dr. Lamas after CBI for many hours, wheatley removed, will f/u in the office. BP improved. Per PA, there was infiltration of the contrast into the arm, will apply warm compress, counselled on elevation.

## 2020-09-21 NOTE — H&P ADULT - NSHPPOAURINARYCATHETER_GEN_ALL_CORE
Message sent to Shawna to follow up on results. Awaiting response. Kezia Arnett RN on 9/21/2020 at 10:17 AM    
no

## 2020-11-17 ENCOUNTER — INPATIENT (INPATIENT)
Facility: HOSPITAL | Age: 75
LOS: 13 days | Discharge: NURSING, REHAB OR LONG-TERM CARE | End: 2020-12-01
Attending: STUDENT IN AN ORGANIZED HEALTH CARE EDUCATION/TRAINING PROGRAM | Admitting: STUDENT IN AN ORGANIZED HEALTH CARE EDUCATION/TRAINING PROGRAM
Payer: MEDICARE

## 2020-11-17 VITALS — HEIGHT: 73 IN

## 2020-11-17 LAB
ALBUMIN SERPL ELPH-MCNC: 3.6 G/DL — SIGNIFICANT CHANGE UP (ref 3.3–5)
ALP SERPL-CCNC: 91 U/L — SIGNIFICANT CHANGE UP (ref 40–120)
ALT FLD-CCNC: 14 U/L — SIGNIFICANT CHANGE UP (ref 4–41)
ANION GAP SERPL CALC-SCNC: 28 MMO/L — HIGH (ref 7–14)
ANISOCYTOSIS BLD QL: SIGNIFICANT CHANGE UP
APTT BLD: 28.4 SEC — SIGNIFICANT CHANGE UP (ref 27–36.3)
AST SERPL-CCNC: 28 U/L — SIGNIFICANT CHANGE UP (ref 4–40)
B PERT DNA SPEC QL NAA+PROBE: SIGNIFICANT CHANGE UP
BASE EXCESS BLDV CALC-SCNC: -0.7 MMOL/L — SIGNIFICANT CHANGE UP
BASOPHILS # BLD AUTO: 0.02 K/UL — SIGNIFICANT CHANGE UP (ref 0–0.2)
BASOPHILS NFR BLD AUTO: 0.2 % — SIGNIFICANT CHANGE UP (ref 0–2)
BASOPHILS NFR SPEC: 0 % — SIGNIFICANT CHANGE UP (ref 0–2)
BILIRUB SERPL-MCNC: 0.5 MG/DL — SIGNIFICANT CHANGE UP (ref 0.2–1.2)
BLOOD GAS VENOUS - CREATININE: 10.7 MG/DL — HIGH (ref 0.5–1.3)
BLOOD GAS VENOUS - FIO2: 21 — SIGNIFICANT CHANGE UP
BUN SERPL-MCNC: 70 MG/DL — HIGH (ref 7–23)
C PNEUM DNA SPEC QL NAA+PROBE: SIGNIFICANT CHANGE UP
CALCIUM SERPL-MCNC: 10.3 MG/DL — SIGNIFICANT CHANGE UP (ref 8.4–10.5)
CHLORIDE BLDV-SCNC: 98 MMOL/L — SIGNIFICANT CHANGE UP (ref 96–108)
CHLORIDE SERPL-SCNC: 90 MMOL/L — LOW (ref 98–107)
CO2 SERPL-SCNC: 21 MMOL/L — LOW (ref 22–31)
CREAT SERPL-MCNC: 9.92 MG/DL — HIGH (ref 0.5–1.3)
ELLIPTOCYTES BLD QL SMEAR: SLIGHT — SIGNIFICANT CHANGE UP
EOSINOPHIL # BLD AUTO: 0.01 K/UL — SIGNIFICANT CHANGE UP (ref 0–0.5)
EOSINOPHIL NFR BLD AUTO: 0.1 % — SIGNIFICANT CHANGE UP (ref 0–6)
EOSINOPHIL NFR FLD: 0 % — SIGNIFICANT CHANGE UP (ref 0–6)
FLUAV H1 2009 PAND RNA SPEC QL NAA+PROBE: SIGNIFICANT CHANGE UP
FLUAV H1 RNA SPEC QL NAA+PROBE: SIGNIFICANT CHANGE UP
FLUAV H3 RNA SPEC QL NAA+PROBE: SIGNIFICANT CHANGE UP
FLUAV SUBTYP SPEC NAA+PROBE: SIGNIFICANT CHANGE UP
FLUBV RNA SPEC QL NAA+PROBE: SIGNIFICANT CHANGE UP
GAS PNL BLDV: 138 MMOL/L — SIGNIFICANT CHANGE UP (ref 136–146)
GIANT PLATELETS BLD QL SMEAR: PRESENT — SIGNIFICANT CHANGE UP
GLUCOSE BLDV-MCNC: 154 MG/DL — HIGH (ref 70–99)
GLUCOSE SERPL-MCNC: 163 MG/DL — HIGH (ref 70–99)
HADV DNA SPEC QL NAA+PROBE: SIGNIFICANT CHANGE UP
HCO3 BLDV-SCNC: 22 MMOL/L — SIGNIFICANT CHANGE UP (ref 20–27)
HCOV PNL SPEC NAA+PROBE: SIGNIFICANT CHANGE UP
HCT VFR BLD CALC: 30.6 % — LOW (ref 39–50)
HCT VFR BLDV CALC: 29.2 % — LOW (ref 39–51)
HGB BLD-MCNC: 8.4 G/DL — LOW (ref 13–17)
HGB BLDV-MCNC: 9.4 G/DL — LOW (ref 13–17)
HMPV RNA SPEC QL NAA+PROBE: SIGNIFICANT CHANGE UP
HPIV1 RNA SPEC QL NAA+PROBE: SIGNIFICANT CHANGE UP
HPIV2 RNA SPEC QL NAA+PROBE: SIGNIFICANT CHANGE UP
HPIV3 RNA SPEC QL NAA+PROBE: SIGNIFICANT CHANGE UP
HPIV4 RNA SPEC QL NAA+PROBE: SIGNIFICANT CHANGE UP
HYPOCHROMIA BLD QL: SIGNIFICANT CHANGE UP
IMM GRANULOCYTES NFR BLD AUTO: 0.6 % — SIGNIFICANT CHANGE UP (ref 0–1.5)
INR BLD: 1.26 — HIGH (ref 0.88–1.16)
LACTATE BLDV-MCNC: 8 MMOL/L — CRITICAL HIGH (ref 0.5–2)
LYMPHOCYTES # BLD AUTO: 0.88 K/UL — LOW (ref 1–3.3)
LYMPHOCYTES # BLD AUTO: 9.5 % — LOW (ref 13–44)
LYMPHOCYTES NFR SPEC AUTO: 6.1 % — LOW (ref 13–44)
MACROCYTES BLD QL: SLIGHT — SIGNIFICANT CHANGE UP
MCHC RBC-ENTMCNC: 23.1 PG — LOW (ref 27–34)
MCHC RBC-ENTMCNC: 27.5 % — LOW (ref 32–36)
MCV RBC AUTO: 84.1 FL — SIGNIFICANT CHANGE UP (ref 80–100)
MONOCYTES # BLD AUTO: 0.8 K/UL — SIGNIFICANT CHANGE UP (ref 0–0.9)
MONOCYTES NFR BLD AUTO: 8.6 % — SIGNIFICANT CHANGE UP (ref 2–14)
MONOCYTES NFR BLD: 11.3 % — HIGH (ref 2–9)
NEUTROPHIL AB SER-ACNC: 78.3 % — HIGH (ref 43–77)
NEUTROPHILS # BLD AUTO: 7.49 K/UL — HIGH (ref 1.8–7.4)
NEUTROPHILS NFR BLD AUTO: 81 % — HIGH (ref 43–77)
NRBC # FLD: 0 K/UL — SIGNIFICANT CHANGE UP (ref 0–0)
OVALOCYTES BLD QL SMEAR: SLIGHT — SIGNIFICANT CHANGE UP
PCO2 BLDV: 54 MMHG — HIGH (ref 41–51)
PH BLDV: 7.29 PH — LOW (ref 7.32–7.43)
PLATELET # BLD AUTO: 221 K/UL — SIGNIFICANT CHANGE UP (ref 150–400)
PLATELET COUNT - ESTIMATE: NORMAL — SIGNIFICANT CHANGE UP
PMV BLD: 10.6 FL — SIGNIFICANT CHANGE UP (ref 7–13)
PO2 BLDV: 15 MMHG — LOW (ref 35–40)
POIKILOCYTOSIS BLD QL AUTO: SLIGHT — SIGNIFICANT CHANGE UP
POLYCHROMASIA BLD QL SMEAR: SLIGHT — SIGNIFICANT CHANGE UP
POTASSIUM BLDV-SCNC: 5.4 MMOL/L — HIGH (ref 3.4–4.5)
POTASSIUM SERPL-MCNC: 6.1 MMOL/L — HIGH (ref 3.5–5.3)
POTASSIUM SERPL-SCNC: 6.1 MMOL/L — HIGH (ref 3.5–5.3)
PROT SERPL-MCNC: 8.1 G/DL — SIGNIFICANT CHANGE UP (ref 6–8.3)
PROTHROM AB SERPL-ACNC: 14.2 SEC — HIGH (ref 10.6–13.6)
RAPID RVP RESULT: SIGNIFICANT CHANGE UP
RBC # BLD: 3.64 M/UL — LOW (ref 4.2–5.8)
RBC # FLD: 24.3 % — HIGH (ref 10.3–14.5)
RSV RNA SPEC QL NAA+PROBE: SIGNIFICANT CHANGE UP
RV+EV RNA SPEC QL NAA+PROBE: SIGNIFICANT CHANGE UP
SAO2 % BLDV: 9.5 % — LOW (ref 60–85)
SARS-COV-2 RNA SPEC QL NAA+PROBE: SIGNIFICANT CHANGE UP
SODIUM SERPL-SCNC: 139 MMOL/L — SIGNIFICANT CHANGE UP (ref 135–145)
TROPONIN T, HIGH SENSITIVITY: 187 NG/L — CRITICAL HIGH (ref ?–14)
VARIANT LYMPHS # BLD: 4.3 % — SIGNIFICANT CHANGE UP
WBC # BLD: 9.26 K/UL — SIGNIFICANT CHANGE UP (ref 3.8–10.5)
WBC # FLD AUTO: 9.26 K/UL — SIGNIFICANT CHANGE UP (ref 3.8–10.5)

## 2020-11-17 PROCEDURE — 74177 CT ABD & PELVIS W/CONTRAST: CPT | Mod: 26

## 2020-11-17 PROCEDURE — 99285 EMERGENCY DEPT VISIT HI MDM: CPT

## 2020-11-17 PROCEDURE — 71045 X-RAY EXAM CHEST 1 VIEW: CPT | Mod: 26

## 2020-11-17 RX ORDER — SODIUM CHLORIDE 9 MG/ML
1000 INJECTION INTRAMUSCULAR; INTRAVENOUS; SUBCUTANEOUS ONCE
Refills: 0 | Status: COMPLETED | OUTPATIENT
Start: 2020-11-17 | End: 2020-11-17

## 2020-11-17 RX ORDER — DEXTROSE 50 % IN WATER 50 %
50 SYRINGE (ML) INTRAVENOUS ONCE
Refills: 0 | Status: COMPLETED | OUTPATIENT
Start: 2020-11-17 | End: 2020-11-17

## 2020-11-17 RX ORDER — INSULIN HUMAN 100 [IU]/ML
4 INJECTION, SOLUTION SUBCUTANEOUS ONCE
Refills: 0 | Status: COMPLETED | OUTPATIENT
Start: 2020-11-17 | End: 2020-11-17

## 2020-11-17 RX ORDER — ACETAMINOPHEN 500 MG
650 TABLET ORAL ONCE
Refills: 0 | Status: COMPLETED | OUTPATIENT
Start: 2020-11-17 | End: 2020-11-17

## 2020-11-17 RX ORDER — SODIUM CHLORIDE 9 MG/ML
1000 INJECTION INTRAMUSCULAR; INTRAVENOUS; SUBCUTANEOUS
Refills: 0 | Status: DISCONTINUED | OUTPATIENT
Start: 2020-11-17 | End: 2020-11-18

## 2020-11-17 RX ORDER — PIPERACILLIN AND TAZOBACTAM 4; .5 G/20ML; G/20ML
3.38 INJECTION, POWDER, LYOPHILIZED, FOR SOLUTION INTRAVENOUS ONCE
Refills: 0 | Status: COMPLETED | OUTPATIENT
Start: 2020-11-17 | End: 2020-11-17

## 2020-11-17 RX ADMIN — SODIUM CHLORIDE 125 MILLILITER(S): 9 INJECTION INTRAMUSCULAR; INTRAVENOUS; SUBCUTANEOUS at 19:28

## 2020-11-17 RX ADMIN — Medication 50 MILLILITER(S): at 21:59

## 2020-11-17 RX ADMIN — PIPERACILLIN AND TAZOBACTAM 3.38 GRAM(S): 4; .5 INJECTION, POWDER, LYOPHILIZED, FOR SOLUTION INTRAVENOUS at 23:39

## 2020-11-17 RX ADMIN — INSULIN HUMAN 4 UNIT(S): 100 INJECTION, SOLUTION SUBCUTANEOUS at 22:00

## 2020-11-17 RX ADMIN — Medication 650 MILLIGRAM(S): at 19:28

## 2020-11-17 RX ADMIN — SODIUM CHLORIDE 1000 MILLILITER(S): 9 INJECTION INTRAMUSCULAR; INTRAVENOUS; SUBCUTANEOUS at 19:57

## 2020-11-17 RX ADMIN — PIPERACILLIN AND TAZOBACTAM 200 GRAM(S): 4; .5 INJECTION, POWDER, LYOPHILIZED, FOR SOLUTION INTRAVENOUS at 19:28

## 2020-11-17 NOTE — ED ADULT NURSE NOTE - NSIMPLEMENTINTERV_GEN_ALL_ED
Pt present in milieu for meals and groups  Reads book to cope  Rates anxiety 7/10 and depression 4/10  Pt Denies SI/HI/AH/VH  Pt states having severe stomach pain this morning and that breakfast upset stomach more  After lunch pain decreased and is "mild" List of acidic food to avoid offered to pt  Dr Dennis Dunbar consulted and aware of consult  Med compliant  No acute behaviors noted  Q 7 min safety checks maintained  Implemented All Fall Risk Interventions:  [place] to call system. Call bell, personal items and telephone within reach. Instruct patient to call for assistance. Room bathroom lighting operational. Non-slip footwear when patient is off stretcher. Physically safe environment: no spills, clutter or unnecessary equipment. Stretcher in lowest position, wheels locked, appropriate side rails in place. Provide visual cue, wrist band, yellow gown, etc. Monitor gait and stability. Monitor for mental status changes and reorient to person, place, and time. Review medications for side effects contributing to fall risk. Reinforce activity limits and safety measures with patient and family.

## 2020-11-17 NOTE — ED PROVIDER NOTE - ATTENDING CONTRIBUTION TO CARE
Attending Statement: I have reviewed and agree with all pertinent clinical information, including history and physical exam and agree with treatment plan of the PA, except as noted.  75 year old man PMHx ESRD on HD (TTS), emphysema, BIBEMS from home after family did not speak w him x one day and he missed HD. Per triage note EMS found pt sitting on couch, unkept, soiled and bedbugs in the home. Pt denies any complaints. no cp/sob no n/v/d no focal weakness or numbness no trauma no sick contact. pt poor historian. On arrival pt take to decon  Vital signs noted. thin male, no sign of head/facial trauma supple neck. mmm. non icteric. no juandice. slight tachycardia   regular, poor inspiratory effort no retractions pulse ox 98-99 RA soft nondistended tender lower abdomen charly on left no guarding or rebound no cvat. no leg swelling no calf pain. Alert to self, place, following commands, poor historian.   plan labs, urine if possible pt states he doesn't urinate but triage ntoed found sitting in urine, cxr, ct head ct abdomen-pelvis, IVF, pain med, covid, tba

## 2020-11-17 NOTE — ED PROVIDER NOTE - PROGRESS NOTE DETAILS
Pt found to be hyperkalemic. Will treat with insulin and D50. Nephrology paged. No indication for emergent dialysis at this time. VSS. Trop 187. Pt ESRD and sepsis which is likely contributing to elevated troponin. No chest pain or SOB. Low suspicion for ACS. Will repeat trop, give asa.

## 2020-11-17 NOTE — ED ADULT TRIAGE NOTE - CHIEF COMPLAINT QUOTE
Pt found by family sitting on his couch, after not hearing from him after a few days. Pt noted to be sitting in urine and covered in bed bugs, Pt directed to [facility] room. RT AV fistula. Missed dialysis today (Tues, TH, Sat)

## 2020-11-17 NOTE — ED PROVIDER NOTE - PSH
h/o  LUE A-V fistula    h/o AV fistula - left upper arm    H/O unilateral nephrectomy  9/2013  History of colon resection - April 2012    S/P kidney transplant  09/2013 in [place]

## 2020-11-17 NOTE — ED PROVIDER NOTE - CLINICAL SUMMARY MEDICAL DECISION MAKING FREE TEXT BOX
74 yo male with ESRD on dialysis ([place], [facility] alej[facility], sat), missed dialysis today presents to ED febrile and tachycardic.   plan   - labs  - blood cx  - cxr  - ctap   - abx  - ivf  - admit

## 2020-11-17 NOTE — ED PROVIDER NOTE - OBJECTIVE STATEMENT
76 yo male with ESRD on dialysis (Tues, Thurs, sat), COPD, HTN, HLD presents to ED after being found by family sitting on couch covered in urine and bed bugs. Pt missed dialysis today. Family states they had not heard from him in a couple of days. Pt states last dialysis was Saturday. Pt has no complaints. Pt found to be febrile, tachycardic in triage. Pt denies chest pain, sob, cough, abd pain, n/v/d, headache, dizziness. Pt makes no urine. No recent travel or known sick contacts.

## 2020-11-17 NOTE — ED ADULT NURSE NOTE - OBJECTIVE STATEMENT
75 male, hx of CKD (dialysis t, ur, sat- last session Saturday, AV fistula to R arm), Kidney CA, HTN, COPD, brought in by EMS after family called for not hearing from pt for several days. As per EMS, family states pt was found sitting on couch covered in bedbugs and urine (pt states he does not make urine, pt was brought to decon room before entering exam room). pt able to answer questions appropriately (name, , place), however confused on exactly why he is here. pt states his family was concerned bc they have not heard from him in several days and that he has not been compliant with meds over last week related to decreased appetite. pt poor historian. pt states abdominal pain x 1 week (tender to LLQ), denies n/v/d, dizziness x 1 week. pt febrile on exam. pt reports chronic cough. pt denies headache, chest pain, SOB. no swelling noted to LE. 20g iv insert to L upper arm. 75 male, hx of CKD (dialysis t, thur, sat- last session Saturday, AV fistula to R arm), Kidney CA, HTN, COPD, brought in by EMS after family called for not hearing from pt for several days. As per EMS, family states pt was found sitting on couch covered in bedbugs and urine (pt states he does not make urine, pt was brought to decon room before entering exam room). pt able to answer questions appropriately (name, , place), however confused on exactly why he is here. pt states his family was concerned bc they have not heard from him in several days and that he has not been compliant with meds over last week related to decreased appetite. pt poor historian. pt states abdominal pain x 1 week (tender to LLQ), denies n/v/d, dizziness x 1 week. pt febrile on exam. pt reports chronic cough. pt denies headache, chest pain, SOB. no swelling noted to LE. 20g iv insert to L upper arm. pt noted to be tremulous

## 2020-11-17 NOTE — ED PROVIDER NOTE - CARE PLAN
Principal Discharge DX:	Renal cell carcinoma of right kidney  Secondary Diagnosis:	Weakness generalized

## 2020-11-17 NOTE — ED ADULT NURSE REASSESSMENT NOTE - NS ED NURSE REASSESS COMMENT FT1
Received report from day shift RN. Pt A&Ox4. Resting comfortably in bed and offers no acute complaints at this time. Fall precautions in place. Will continue to monitor

## 2020-11-17 NOTE — ED ADULT NURSE NOTE - CHIEF COMPLAINT QUOTE
Pt found by family sitting on his couch, after not hearing from him after a few days. Pt noted to be sitting in urine and covered in bed bugs, Pt directed to [facility] room. RT AV fistula. Missed dialysis today (Tues, TH, Sat)

## 2020-11-18 DIAGNOSIS — C64.1 MALIGNANT NEOPLASM OF RIGHT KIDNEY, EXCEPT RENAL PELVIS: ICD-10-CM

## 2020-11-18 DIAGNOSIS — R53.1 WEAKNESS: ICD-10-CM

## 2020-11-18 DIAGNOSIS — Z79.899 OTHER LONG TERM (CURRENT) DRUG THERAPY: ICD-10-CM

## 2020-11-18 DIAGNOSIS — N18.6 END STAGE RENAL DISEASE: ICD-10-CM

## 2020-11-18 DIAGNOSIS — R65.10 SYSTEMIC INFLAMMATORY RESPONSE SYNDROME (SIRS) OF NON-INFECTIOUS ORIGIN WITHOUT ACUTE ORGAN DYSFUNCTION: ICD-10-CM

## 2020-11-18 DIAGNOSIS — Z02.9 ENCOUNTER FOR ADMINISTRATIVE EXAMINATIONS, UNSPECIFIED: ICD-10-CM

## 2020-11-18 DIAGNOSIS — R10.9 UNSPECIFIED ABDOMINAL PAIN: ICD-10-CM

## 2020-11-18 DIAGNOSIS — D64.9 ANEMIA, UNSPECIFIED: ICD-10-CM

## 2020-11-18 DIAGNOSIS — Z29.9 ENCOUNTER FOR PROPHYLACTIC MEASURES, UNSPECIFIED: ICD-10-CM

## 2020-11-18 DIAGNOSIS — G93.40 ENCEPHALOPATHY, UNSPECIFIED: ICD-10-CM

## 2020-11-18 DIAGNOSIS — Z51.5 ENCOUNTER FOR PALLIATIVE CARE: ICD-10-CM

## 2020-11-18 DIAGNOSIS — I10 ESSENTIAL (PRIMARY) HYPERTENSION: ICD-10-CM

## 2020-11-18 LAB
AMMONIA BLD-MCNC: 19 UMOL/L — SIGNIFICANT CHANGE UP (ref 11–55)
ANION GAP SERPL CALC-SCNC: 18 MMO/L — HIGH (ref 7–14)
ANION GAP SERPL CALC-SCNC: 19 MMO/L — HIGH (ref 7–14)
BASE EXCESS BLDV CALC-SCNC: 1.5 MMOL/L — SIGNIFICANT CHANGE UP
BASOPHILS # BLD AUTO: 0 K/UL — SIGNIFICANT CHANGE UP (ref 0–0.2)
BASOPHILS NFR BLD AUTO: 0 % — SIGNIFICANT CHANGE UP (ref 0–2)
BLD GP AB SCN SERPL QL: NEGATIVE — SIGNIFICANT CHANGE UP
BLOOD GAS VENOUS - CREATININE: SIGNIFICANT CHANGE UP MG/DL (ref 0.5–1.3)
BLOOD GAS VENOUS - FIO2: 21 — SIGNIFICANT CHANGE UP
BUN SERPL-MCNC: 70 MG/DL — HIGH (ref 7–23)
BUN SERPL-MCNC: 83 MG/DL — HIGH (ref 7–23)
CALCIUM SERPL-MCNC: 8.8 MG/DL — SIGNIFICANT CHANGE UP (ref 8.4–10.5)
CALCIUM SERPL-MCNC: 9.3 MG/DL — SIGNIFICANT CHANGE UP (ref 8.4–10.5)
CHLORIDE BLDV-SCNC: 97 MMOL/L — SIGNIFICANT CHANGE UP (ref 96–108)
CHLORIDE SERPL-SCNC: 95 MMOL/L — LOW (ref 98–107)
CHLORIDE SERPL-SCNC: 95 MMOL/L — LOW (ref 98–107)
CO2 SERPL-SCNC: 22 MMOL/L — SIGNIFICANT CHANGE UP (ref 22–31)
CO2 SERPL-SCNC: 23 MMOL/L — SIGNIFICANT CHANGE UP (ref 22–31)
CREAT SERPL-MCNC: 10.3 MG/DL — HIGH (ref 0.5–1.3)
CREAT SERPL-MCNC: 9.86 MG/DL — HIGH (ref 0.5–1.3)
EOSINOPHIL # BLD AUTO: 0 K/UL — SIGNIFICANT CHANGE UP (ref 0–0.5)
EOSINOPHIL NFR BLD AUTO: 0 % — SIGNIFICANT CHANGE UP (ref 0–6)
GAS PNL BLDV: 134 MMOL/L — LOW (ref 136–146)
GLUCOSE BLDC GLUCOMTR-MCNC: 93 MG/DL — SIGNIFICANT CHANGE UP (ref 70–99)
GLUCOSE BLDV-MCNC: 89 MG/DL — SIGNIFICANT CHANGE UP (ref 70–99)
GLUCOSE SERPL-MCNC: 127 MG/DL — HIGH (ref 70–99)
GLUCOSE SERPL-MCNC: 87 MG/DL — SIGNIFICANT CHANGE UP (ref 70–99)
HBV SURFACE AG SER-ACNC: NEGATIVE — SIGNIFICANT CHANGE UP
HCO3 BLDV-SCNC: 26 MMOL/L — SIGNIFICANT CHANGE UP (ref 20–27)
HCT VFR BLD CALC: 24.4 % — LOW (ref 39–50)
HCT VFR BLDV CALC: 20.3 % — CRITICAL LOW (ref 39–51)
HGB BLD-MCNC: 7.2 G/DL — LOW (ref 13–17)
HGB BLDV-MCNC: 6.5 G/DL — CRITICAL LOW (ref 13–17)
IMM GRANULOCYTES NFR BLD AUTO: 0.8 % — SIGNIFICANT CHANGE UP (ref 0–1.5)
LACTATE BLDV-MCNC: 1.7 MMOL/L — SIGNIFICANT CHANGE UP (ref 0.5–2)
LYMPHOCYTES # BLD AUTO: 0.8 K/UL — LOW (ref 1–3.3)
LYMPHOCYTES # BLD AUTO: 12.5 % — LOW (ref 13–44)
MAGNESIUM SERPL-MCNC: 2.4 MG/DL — SIGNIFICANT CHANGE UP (ref 1.6–2.6)
MCHC RBC-ENTMCNC: 24.2 PG — LOW (ref 27–34)
MCHC RBC-ENTMCNC: 29.5 % — LOW (ref 32–36)
MCV RBC AUTO: 82.2 FL — SIGNIFICANT CHANGE UP (ref 80–100)
MONOCYTES # BLD AUTO: 0.6 K/UL — SIGNIFICANT CHANGE UP (ref 0–0.9)
MONOCYTES NFR BLD AUTO: 9.3 % — SIGNIFICANT CHANGE UP (ref 2–14)
NEUTROPHILS # BLD AUTO: 4.97 K/UL — SIGNIFICANT CHANGE UP (ref 1.8–7.4)
NEUTROPHILS NFR BLD AUTO: 77.4 % — HIGH (ref 43–77)
NRBC # FLD: 0 K/UL — SIGNIFICANT CHANGE UP (ref 0–0)
PCO2 BLDV: 43 MMHG — SIGNIFICANT CHANGE UP (ref 41–51)
PH BLDV: 7.4 PH — SIGNIFICANT CHANGE UP (ref 7.32–7.43)
PHOSPHATE SERPL-MCNC: 5.5 MG/DL — HIGH (ref 2.5–4.5)
PLATELET # BLD AUTO: 129 K/UL — LOW (ref 150–400)
PMV BLD: 9.2 FL — SIGNIFICANT CHANGE UP (ref 7–13)
PO2 BLDV: 43 MMHG — HIGH (ref 35–40)
POTASSIUM BLDV-SCNC: 4.9 MMOL/L — HIGH (ref 3.4–4.5)
POTASSIUM SERPL-MCNC: 4.9 MMOL/L — SIGNIFICANT CHANGE UP (ref 3.5–5.3)
POTASSIUM SERPL-MCNC: 5.3 MMOL/L — SIGNIFICANT CHANGE UP (ref 3.5–5.3)
POTASSIUM SERPL-SCNC: 4.9 MMOL/L — SIGNIFICANT CHANGE UP (ref 3.5–5.3)
POTASSIUM SERPL-SCNC: 5.3 MMOL/L — SIGNIFICANT CHANGE UP (ref 3.5–5.3)
RBC # BLD: 2.97 M/UL — LOW (ref 4.2–5.8)
RBC # FLD: 23.8 % — HIGH (ref 10.3–14.5)
RH IG SCN BLD-IMP: POSITIVE — SIGNIFICANT CHANGE UP
SAO2 % BLDV: 78.9 % — SIGNIFICANT CHANGE UP (ref 60–85)
SODIUM SERPL-SCNC: 136 MMOL/L — SIGNIFICANT CHANGE UP (ref 135–145)
SODIUM SERPL-SCNC: 136 MMOL/L — SIGNIFICANT CHANGE UP (ref 135–145)
TROPONIN T, HIGH SENSITIVITY: 160 NG/L — CRITICAL HIGH (ref ?–14)
WBC # BLD: 6.42 K/UL — SIGNIFICANT CHANGE UP (ref 3.8–10.5)
WBC # FLD AUTO: 6.42 K/UL — SIGNIFICANT CHANGE UP (ref 3.8–10.5)

## 2020-11-18 PROCEDURE — 99223 1ST HOSP IP/OBS HIGH 75: CPT | Mod: GC

## 2020-11-18 RX ORDER — CHLORHEXIDINE GLUCONATE 213 G/1000ML
1 SOLUTION TOPICAL
Refills: 0 | Status: DISCONTINUED | OUTPATIENT
Start: 2020-11-18 | End: 2020-12-01

## 2020-11-18 RX ORDER — SOD,AMMONIUM,POTASSIUM LACTATE
1 CREAM (GRAM) TOPICAL
Qty: 0 | Refills: 0 | DISCHARGE

## 2020-11-18 RX ORDER — ALBUTEROL 90 UG/1
2 AEROSOL, METERED ORAL
Qty: 0 | Refills: 0 | DISCHARGE

## 2020-11-18 RX ORDER — ERYTHROPOIETIN 10000 [IU]/ML
10000 INJECTION, SOLUTION INTRAVENOUS; SUBCUTANEOUS
Refills: 0 | Status: DISCONTINUED | OUTPATIENT
Start: 2020-11-18 | End: 2020-11-30

## 2020-11-18 RX ORDER — PIPERACILLIN AND TAZOBACTAM 4; .5 G/20ML; G/20ML
3.38 INJECTION, POWDER, LYOPHILIZED, FOR SOLUTION INTRAVENOUS EVERY 12 HOURS
Refills: 0 | Status: DISCONTINUED | OUTPATIENT
Start: 2020-11-18 | End: 2020-11-23

## 2020-11-18 RX ORDER — ACETAMINOPHEN 500 MG
650 TABLET ORAL EVERY 6 HOURS
Refills: 0 | Status: DISCONTINUED | OUTPATIENT
Start: 2020-11-18 | End: 2020-11-19

## 2020-11-18 RX ORDER — ASPIRIN/CALCIUM CARB/MAGNESIUM 324 MG
162 TABLET ORAL ONCE
Refills: 0 | Status: COMPLETED | OUTPATIENT
Start: 2020-11-18 | End: 2020-11-18

## 2020-11-18 RX ORDER — ERGOCALCIFEROL 1.25 MG/1
1 CAPSULE ORAL
Qty: 0 | Refills: 0 | DISCHARGE

## 2020-11-18 RX ORDER — TIOTROPIUM BROMIDE 18 UG/1
1 CAPSULE ORAL; RESPIRATORY (INHALATION)
Qty: 0 | Refills: 0 | DISCHARGE

## 2020-11-18 RX ADMIN — PIPERACILLIN AND TAZOBACTAM 25 GRAM(S): 4; .5 INJECTION, POWDER, LYOPHILIZED, FOR SOLUTION INTRAVENOUS at 18:04

## 2020-11-18 RX ADMIN — ERYTHROPOIETIN 10000 UNIT(S): 10000 INJECTION, SOLUTION INTRAVENOUS; SUBCUTANEOUS at 21:39

## 2020-11-18 RX ADMIN — SODIUM CHLORIDE 1000 MILLILITER(S): 9 INJECTION INTRAMUSCULAR; INTRAVENOUS; SUBCUTANEOUS at 00:15

## 2020-11-18 RX ADMIN — SODIUM CHLORIDE 125 MILLILITER(S): 9 INJECTION INTRAMUSCULAR; INTRAVENOUS; SUBCUTANEOUS at 00:46

## 2020-11-18 RX ADMIN — Medication 162 MILLIGRAM(S): at 00:46

## 2020-11-18 NOTE — H&P ADULT - NSICDXPASTSURGICALHX_GEN_ALL_CORE_FT
PAST SURGICAL HISTORY:  h/o  LUE A-V fistula     h/o AV fistula - left upper arm     H/O unilateral nephrectomy 9/2013    History of colon resection - April 2012     S/P kidney transplant 09/2013 in [place]

## 2020-11-18 NOTE — PROGRESS NOTE ADULT - PROBLEM SELECTOR PLAN 8
Hgb 8.4-> 7.2. Likely anemia of chronic disease given renal failure and malignancy vs blood loss. Noted recent bleeding from penis, s/p cystoscopy with no source. No active bleeding noted  - Maintain active type and screen  - Monitor H/H and transfuse to keep Hgb > 7  - Check iron studies, folate, vitamin B12

## 2020-11-18 NOTE — PROGRESS NOTE ADULT - PROBLEM SELECTOR PLAN 1
T 101.5, , Lactate 8 on admission. Possible source is intraabdominal given severe pain. Possible necrotic lymph node seen on CT  - C/w Zosyn  - F/u blood cultures

## 2020-11-18 NOTE — CONSULT NOTE ADULT - PROBLEM SELECTOR RECOMMENDATION 4
spoke with pt sister Paolo who is his HCP along with pt's granddaughter Emily  sister understands overall medical condition  will readdress goals after pt has received HD

## 2020-11-18 NOTE — H&P ADULT - PROBLEM SELECTOR PLAN 8
Hgb 8.4-> 7.2. Likely anemia of chronic disease vs blood loss. Noted bleeding from penis, recent cystoscopy with source.  - maintain active type and screen  - May need transfusion Hgb 8.4-> 7.2. Likely anemia of chronic disease given renal failure and malignancy vs blood loss. Noted bleeding from penis, recent cystoscopy with source.  - maintain active type and screen  - May need transfusion Hgb 8.4-> 7.2. Likely anemia of chronic disease given renal failure and malignancy vs blood loss. Noted recent bleeding from penis, s/p cystoscopy with source. No active bleed.  - Maintain active type and screen  - Monitor H/H and transfuse to keep Hgb > 7  - Check iron studies, folate, vitamin B12

## 2020-11-18 NOTE — PHARMACOTHERAPY INTERVENTION NOTE - COMMENTS
Medication history is complete. Medication list updated in Outpatient Medication Record (OMR). Medication list/dosages obtained from outpatient pharmacy.

## 2020-11-18 NOTE — H&P ADULT - NSICDXPASTMEDICALHX_GEN_ALL_CORE_FT
PAST MEDICAL HISTORY:  Aortic valve disorder     Arterial insufficiency of lower extremity RLE    AV fistula infection, sequela     BPH (benign prostatic hyperplasia)     COPD (chronic obstructive pulmonary disease)     Diverticulitis of colon     Diverticulitis of colon     Dizziness     Emphysema/COPD     Enlarged prostate without lower urinary tract symptoms (luts)     ESRD (end stage renal disease)     History of smoking 2 pac for 25 yrs , quit 7 yrs ago     HLD (hyperlipidemia)     HTN (hypertension)     PE (pulmonary embolism)     Renal cancer     Tobacco abuse Quit in 2005, Smoked 2.5 PPD for 15 years

## 2020-11-18 NOTE — H&P ADULT - PROBLEM SELECTOR PLAN 6
Diagnosed 3 months ago. Imaging shows metastasis to liver and possible tumor thrombus/necrotic lymph node with mass effect on IVC. On Sunitinib.  - Should discuss patient's treatment plan with Dr. Castillo from [facility] [place]

## 2020-11-18 NOTE — H&P ADULT - PROBLEM SELECTOR PLAN 10
Transitions of Care Status:  1.  Name of PCP:  2.  PCP Contacted on Admission: [ ] Y    [ ] N    3.  PCP contacted at Discharge: [ ] Y    [ ] N    [ ] N/A  4.  Post-Discharge Appointment Date and Location:  5.  Summary of Handoff given to PCP: DVT PPx holding d/t anemia and recent blood loss, but if stable would need given malignancy.  Diet- renal replacement/DASH  Dispo: PT consult

## 2020-11-18 NOTE — PROGRESS NOTE ADULT - PROBLEM SELECTOR PLAN 4
Collateral information obtained from patient's oncologist Dr. 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 He had CT imaging consistent with liver metastases at the time  -Patient was prescribed Sutinib in July 2020, which he had trouble obtaining due to its cost- had poor follow up and was not seen again in the office until September when cost was addressed- did not start Sutinib until October 11th  -Previous imaging showed sclerotic lesion in T8, with possible other bony metastases- an MRI of the spine was ordered, which patient did not get done  -Patient 1 month ago complained of worsening dizziness, MRI head was ordered to r/o metastatic disease, which patient did not get done  -Per Dr. Castillo, palliative care is appropriate, although it is hard to say if this is progression of disease on currently chemotherapy given questionable patient compliance and delay in starting treatment since initial scans were done  -Will order MRI of the head and spine w/wo contrast while inpatient to assess for metastatic disease  -Hold Sutinib in setting of infection

## 2020-11-18 NOTE — H&P ADULT - PROBLEM SELECTOR PLAN 2
Given time course with initiation of Sunitinib, may be 2/2 medication.   - Maalox  - Tylenol as needed  - Consider GI consult and/or Palliative consult  - Discuss with Dr. Castillo from [facility] re: next steps and plan Given time course with initiation of Sunitinib, may be 2/2 medication. Possibly from IVC compression or tumor invasion, may be  - Maalox  - Tylenol as needed  - Consider GI consult and/or Palliative consult  - Discuss with Dr. Castillo from [facility] re: next steps and plan  - BLE duplex US Given time course with initiation of Sunitinib, may be 2/2 medication. Possibly from IVC compression with necrotic lymph node or tumor invasion. Lactate improved from 8 to 1.7. No peritoneal signs on exam.  - Maalox as needed  - Tylenol as needed  - Consider GI consult and/or Palliative consult  - Discuss with Dr. Castillo from [facility] in AM re: next steps and plan  - US b/l LE venous dopplers ordered to r/o DVT given suprapubic/groin pain  - Serial abdominal exams

## 2020-11-18 NOTE — H&P ADULT - NSHPREVIEWOFSYSTEMS_GEN_ALL_CORE
CONSTITUTIONAL: No weakness, fevers or chills  EYES/ENT: No visual changes;  No vertigo or throat pain   NECK: No pain or stiffness  RESPIRATORY: +cough, No wheezing, hemoptysis; No shortness of breath  CARDIOVASCULAR: No chest pain or palpitations  GASTROINTESTINAL: No abdominal or epigastric pain. No nausea, vomiting, or hematemesis; No diarrhea or constipation. No melena or hematochezia. Poor appetite.  GENITOURINARY: No dysuria, frequency or hematuria  NEUROLOGICAL: No numbness or weakness  SKIN: No itching, burning, rashes, or lesions   All other review of systems is negative unless indicated above. CONSTITUTIONAL:+ weakness, no fevers, chills  EYES/ENT:  No throat pain   NECK: No pain or stiffness  RESPIRATORY: +cough, No wheezing, hemoptysis; No shortness of breath  CARDIOVASCULAR: No chest pain or palpitations  GASTROINTESTINAL: +abdominal pain. No nausea, vomiting, or hematemesis; No diarrhea or constipation. No melena or hematochezia. + Poor appetite.  GENITOURINARY: Anuric  NEUROLOGICAL: +Weakness  SKIN: No rashes   All other review of systems is negative unless indicated above. Constitutional: +Generalized weakness. +Fevers.    Eyes: No visual changes, double vision, or eye pain  Ears, Nose, Mouth, Throat: No runny nose, sinus pain, ear pain, tinnitus, sore throat, dysphagia, or odynophagia  Cardiovascular: No chest pain, palpitations, or LE edema  Respiratory: +Chronic cough. No wheezing, hemoptysis, or shortness of breath.  Gastrointestinal: +Abdominal pain. +Poor appetite. No nausea/vomiting, diarrhea/constipation, hematemesis, melena, or BRBPR.  Genitourinary: +Anuric. +Recent penile bleed.   Musculoskeletal: No joint pain, joint swelling, or decreased ROM  Skin: No pruritus or rashes  Neurologic: No seizures, headache, paresthesias, or numbness  Psychiatric: +Confusion (improved). No depression, anxiety, or agitation.  Endocrine: No heat/cold intolerance, mood swings, sweats, polydipsia, or polyuria  Hematologic/lymphatic: No purpura, petechia, or prolonged or excessive bleeding after injury  Allergic/Immunologic: No anaphylaxis or allergic response to materials, foods, animals    Positives and pertinent negatives noted and all other systems negative.

## 2020-11-18 NOTE — H&P ADULT - HISTORY OF PRESENT ILLNESS
Was recently in [facility] for 3 days then 2 days  Diagnosed with kidney cancer 3 months ago. Was put on a medicine but there was a delay. Dr. Castillo started this  Felt weak the past few days  No fevers  No nv. Anuric  Bleeding from penis for past month. No pain. Continuous. Found where it was coming from but didn't know where it came from   Appt to see Dr Castillo on 20th. Discharged multiple times. Most recently about a week ago  Having abdominal pain x 1 week, "that's why I missed dialysis"  Suprapubic pain     75 year old man PMHx ESRD on HD (TTS), emphysema, renal cancer (diagnosed 3 months ago), HTN, HLD presented to ED with sepsis and   Couldn't walk for two days. Was in [facility] for a week, came home Friday, went to HD Saturday, reportedly couldn't walk when he came back. Had been complaining of stomach pains. Has been receiving PO treatment for renal cancer (Sutent).     Was recently in [facility] for 3 days then 2 days  Diagnosed with kidney cancer 3 months ago. Was put on a medicine but there was a delay. Dr. Castillo started this  Felt weak the past few days  No fevers  No nv. Anuric  Bleeding from penis for past month. No pain. Continuous. Found where it was coming from but didn't know where it came from   Appt to see Dr Castillo on 20th. Discharged multiple times. Most recently about a week ago  Having abdominal pain x 1 week, "that's why I missed dialysis"  Suprapubic pain     75 year old man PMHx ESRD on HD (TTS), emphysema, renal cancer (diagnosed 3 months ago), HTN, HLD presented to ED with sepsis and   Couldn't walk for two days. Was in [facility] for a week, came home Friday, went to HD Saturday, reportedly couldn't walk when he came back. Had been complaining of stomach pains. Has been receiving PO treatment for renal cancer (Sutent). Had been seeming more confused, per sister who talks to him almost every day, worsening over past month.     Was recently in [facility] for 3 days then 2 days  Diagnosed with kidney cancer 3 months ago. Was put on a medicine but there was a delay. Dr. Castillo started this  Felt weak the past few days  No fevers  No nv. Anuric  Bleeding from penis for past month. No pain. Continuous. Found where it was coming from but didn't know where it came from   Appt to see Dr Castillo on 20th. Discharged multiple times. Most recently about a week ago  Having abdominal pain x 1 week, "that's why I missed dialysis"  Suprapubic pain  [phone]   75 year old man PMHx ESRD on HD (TTS), emphysema, renal cancer (diagnosed 3 months ago), HTN, HLD presented to ED with sepsis and AMS.  Per conversation with his sister, Paolo, he couldn't walk for two days since last HD session on Saturday due to weakness. Had previously been in [facility] for a week, discharged to home Friday. He had been complaining of stomach pains. He has been receiving PO treatment for renal cancer (Sutent), prescribed by Dr. Castillo. He had been seeming more confused on the phone, per sister who talks to him almost every day, worsening over past month. Yesterday, sister had not heard from him so she called his HD center, where she was told he did not come to his session, so she called 911, who came to the house and reportedly found him on his couch confused and wet, and surrounded by bed bugs.  He became more alert in the ED, and able to share some story, which was consistent with the above, and was complaining of significant abdominal pain.   Denies fevers, chills, nausea, vomiting, diarrhea, melena, hematochezia, chest pain, SOB. Reports bleeding from his penis for the past month. Reportedly underwent cystoscopy, but was not told the source of the bleeding.     In ED, T 101.5, , /84, SpO2 100% on RA. Hyperkalemic to 6.1 (hemolyzed), elevated troponin 187-> 160, BUN/SCr 70/9.92, CT A/P shows R upper pole renal mass, possible tumoral invasion into R renal vein, large right reterocaval mass with mass effect with narrowing of IVC without distinct fat plane, possibly necrotic lymph node. Also noted liver metastasis.       75 year old man PMHx ESRD on HD (TTS), emphysema, renal cancer (diagnosed 3 months ago), HTN, HLD presented to ED with sepsis and AMS.  Per conversation with his sister, Paolo, he couldn't walk for two days since last HD session on Saturday due to weakness. Had previously been in [facility] for a week, discharged to home Friday. He had been complaining of stomach pains. He has been receiving PO treatment for renal cancer (Sutent), prescribed by Dr. Castillo. He had been seeming more confused on the phone, per sister who talks to him almost every day, worsening over past month. Yesterday, sister had not heard from him so she called his HD center, where she was told he did not come to his session, so she called 911, who came to the house and reportedly found him on his couch confused and wet, and surrounded by bed bugs.  He became more alert in the ED, and able to share some story, which was consistent with the above, and was complaining of significant abdominal pain.   Denies fevers, chills, nausea, vomiting, diarrhea, melena, hematochezia, chest pain, SOB. Reports bleeding from his penis for the past month. Reportedly underwent cystoscopy, but was not told the source of the bleeding.     In ED, T 101.5, , /84, SpO2 100% on RA. Hyperkalemic to 6.1 (hemolyzed), elevated troponin 187-> 160, BUN/SCr 70/9.92, CT A/P shows R upper pole renal mass, possible tumoral invasion into R renal vein, large right reterocaval mass with mass effect with narrowing of IVC without distinct fat plane, possibly necrotic lymph node. Also noted liver metastasis.   75 year old man PMHx ESRD on HD (TTS), emphysema, renal cancer (diagnosed 3 months ago) on sunitinib, HTN, HLD presented to ED with sepsis and AMS.  Per conversation with his sister, Paolo, he couldn't walk for two days since last HD session on Saturday due to weakness. Had previously been in [facility] for a week, discharged to home Friday. He had been complaining of stomach pains. He has been receiving PO treatment for renal cancer (Sutent), prescribed by Dr. Castillo. He had been seeming more confused on the phone, per sister who talks to him almost every day, worsening over past month. Yesterday, sister had not heard from him so she called his HD center, where she was told he did not come to his session, so she called 911, who came to the house and reportedly found him on his couch confused and wet, and surrounded by bed bugs.  He became more alert in the ED, and able to share some story, which was consistent with the above, and was complaining of significant abdominal pain.   Denies fevers, chills, nausea, vomiting, diarrhea, melena, hematochezia, chest pain, SOB. Reports bleeding from his penis for the past month. Reportedly underwent cystoscopy, but was not told the source of the bleeding.     In ED, T 101.5, , /84, SpO2 100% on RA. Hyperkalemic to 6.1 (hemolyzed), elevated troponin 187-> 160, BUN/SCr 70/9.92, CT A/P shows R upper pole renal mass, possible tumoral invasion into R renal vein, large right retrocaval mass with mass effect with narrowing of IVC without distinct fat plane, possibly necrotic lymph node. Also noted liver metastasis.   75 year old man PMHx ESRD on HD (TTS), emphysema, renal cancer (diagnosed 3 months ago) on sunitinib, HTN, HLD presented to ED with sepsis and AMS.  Per conversation with his sister, Paolo, he couldn't walk for two days since last HD session on Saturday due to weakness. Had previously been in [facility] for a week, discharged to home Friday. He had been complaining of stomach pains. He has been receiving PO treatment for renal cancer (Sutent), prescribed by Dr. Castillo. He had been seeming more confused on the phone, per sister who talks to him almost every day, worsening over past month. Yesterday, sister had not heard from him so she called his HD center, where she was told he did not come to his session, so she called 911, who came to the house and reportedly found him on his couch confused and wet, and surrounded by bed bugs.  He became more alert in the ED, and able to share some story, which was consistent with the above, and was complaining of significant abdominal pain.   Denies fevers, chills, nausea, vomiting, diarrhea, melena, hematochezia, chest pain, SOB. Reports bleeding from his penis for the past month. Reportedly underwent cystoscopy, but was not told the source of the bleeding.     In ED, T 101.5, , /84, SpO2 100% on RA. Hyperkalemic to 6.1 (hemolyzed), elevated troponin 187-> 160, BUN/SCr 70/9.92, CT A/P shows R upper pole renal mass, possible tumoral invasion into R renal vein, large right retrocaval mass with mass effect with narrowing of IVC without distinct fat plane, possibly necrotic lymph node. Also noted liver metastasis.

## 2020-11-18 NOTE — H&P ADULT - PROBLEM SELECTOR PLAN 1
T 101.5, , Lactate 8 on admission. Possible source is intraabdominal given severe pain. Possible necrotic lymph node seen on CT. Given Zosyn in ED. Will treat given on PO chemotherapy predisposing to infection.  - Vancomycin by level  - Continue Zosyn  - IV Hydration T 101.5, , Lactate 8 on admission. Possible source is intraabdominal given severe pain. Possible necrotic lymph node seen on CT. Given Zosyn in ED. Will treat given on PO chemotherapy predisposing to infection.  - Vancomycin by level  - Continue Zosyn  - F/u BCx T 101.5, , Lactate 8 on admission. Possible source is intraabdominal given severe pain. Possible necrotic lymph node seen on CT. Given Zosyn in ED. Will treat given pt on PO chemotherapy predisposing to infection.  - C/w Vancomycin by level  - C/w Zosyn  - F/u blood cxs

## 2020-11-18 NOTE — H&P ADULT - PROBLEM SELECTOR PLAN 4
Difficulty walking since HD on Saturday. Possibly 2/2 infection vs adverse medication reaction  - HD today  - Antibiotics as above  - PT Consult Difficulty walking since HD on Saturday. Possibly 2/2 infection vs adverse medication reaction (chemo). No focal weakness noted on exam.  - Antibiotics as above  - PT Consult  - Fall risk protocol

## 2020-11-18 NOTE — PROGRESS NOTE ADULT - PROBLEM SELECTOR PLAN 5
Difficulty walking since HD on Saturday. Possibly 2/2 infection vs adverse medication reaction (chemo).  - Antibiotics as above  - PT Consult  - Fall risk protocol

## 2020-11-18 NOTE — H&P ADULT - NSHPSOCIALHISTORY_GEN_ALL_CORE
Lives alone  Has not had alcohol in a year.  Former smoker x15 years 2 ppd quit 10 years ago. Lives alone  Has not had alcohol in a year.  Former smoker x15 years 2 ppd quit 10 years ago.  Denies illicit drug use.

## 2020-11-18 NOTE — H&P ADULT - PROBLEM SELECTOR PLAN 9
DVT PPx holding d/t anemia and blood loss, but if stable would need given malignancy.  Diet- renal replacement/DASH  Dispo: PT consult Pt or his sister not aware of his home medications or current pharmacy.  - Emailed [facility] pharmacist with assistance to obtain current home medications

## 2020-11-18 NOTE — CONSULT NOTE ADULT - ASSESSMENT
[facility]ARM[facility] arranged for tonight 75M PMHx ESRD on HD (TTS), emphysema, renal cancer (diagnosed 3 months ago) on sunitinib, HTN, HLD presented to ED with sepsis and AMS.  Renal consulted for ESRD Mx.     ESRD (end stage renal disease) on dialysis.   Maintenance HD schedule TTS, last dialyzed 11/14 outpt, missed HD yesterday  Electrolytes and volume status acceptable. clinically not in CHF  HTN, bp well controlled. stable. not on meds  Anemia in CKD- Hg <goal  AMS -resolved. pt at baseline now  SIRS-  Possible necrotic lymph node seen on CT. on Zosyn-renally dosed.   Renal cell carcinoma of right kidney. Mx per oncologist    Informed verbal consent for HD obtained from pt.   arranged for HD tonight w/2k bath, uf 1.5kg as tolearted, epo 10k tiw w/hd  renal restrictions in diet  dose all meds for ESRD  check Fe profile  F/u blood cultures. abxs per ID/medicine    labs, rad, chart reviewed  Thanks for consulting. will closely follow up.

## 2020-11-18 NOTE — H&P ADULT - PROBLEM SELECTOR PLAN 7
Need to clarify patient's antihypertensive medications with his pharmacy. Sister trying to figure out which pharmacy he uses, there was apparently a recent issue with delivery of medications Need to clarify patient's antihypertensive medications with his pharmacy. Sister trying to figure out which pharmacy he uses, there was apparently a recent issue with delivery of medications.  - BPs currently in acceptable range

## 2020-11-18 NOTE — CONSULT NOTE ADULT - PROBLEM SELECTOR RECOMMENDATION 9
if able to take po oxy 5mg po q 4 hours prn  If unable to take po, would start dilaudi 0.2mg iv q 2 hours prn

## 2020-11-18 NOTE — H&P ADULT - ASSESSMENT
75M PMHx ESRD on HD (TTS), emphysema, renal cancer (diagnosed 3 months ago), HTN, HLD presented to ED with sepsis and AMS. 75M PMHx ESRD on HD (TTS), emphysema, renal cancer (diagnosed 3 months ago) on sunitinib, HTN, HLD presented to ED with sepsis and AMS.

## 2020-11-18 NOTE — PROGRESS NOTE ADULT - SUBJECTIVE AND OBJECTIVE BOX
Latha Hartman  Division of Hospital Medicine  Pager #[phone]    Patient is a 75y old  Male who presents with a chief complaint of AMS (18 Nov 2020 03:55)      SUBJECTIVE / OVERNIGHT EVENTS: Patient seen and examined at bedside. Patient is a poor historian- not able to state why he is in the hospital, states "stomach pain." Reports that he lives at home. Currently denies having any abdominal pain.     ADDITIONAL REVIEW OF SYSTEMS:    MEDICATIONS  (STANDING):  piperacillin/tazobactam IVPB.. 3.375 Gram(s) IV Intermittent every 12 hours    MEDICATIONS  (PRN):  acetaminophen   Tablet .. 650 milliGRAM(s) Oral every 6 hours PRN Mild Pain (1 - 3), Moderate Pain (4 - 6)  aluminum hydroxide/magnesium hydroxide/simethicone Suspension 30 milliLiter(s) Oral every 4 hours PRN Dyspepsia      CAPILLARY BLOOD GLUCOSE      POCT Blood Glucose.: 93 mg/dL (18 Nov 2020 01:33)    I&O's Summary      PHYSICAL EXAM:    Vital Signs Last 24 Hrs  T(C): 36.6 (18 Nov 2020 14:23), Max: 38.6 (17 Nov 2020 18:47)  T(F): 97.9 (18 Nov 2020 14:23), Max: 101.5 (17 Nov 2020 18:47)  HR: 74 (18 Nov 2020 14:23) (74 - 107)  BP: 132/53 (18 Nov 2020 14:23) (122/47 - 153/84)  BP(mean): --  RR: 17 (18 Nov 2020 14:23) (14 - 18)  SpO2: 95% (18 Nov 2020 14:23) (95% - 100%)    CONSTITUTIONAL: NAD, well-developed, unkempt, thin  EYES: Conjunctiva and sclera clear  RESPIRATORY: Normal respiratory effort; lungs are clear to auscultation bilaterally  CARDIOVASCULAR: Regular rate and rhythm, normal S1 and S2, no murmur/rub/gallop; No lower extremity edema  ABDOMEN: Nontender to palpation, normoactive bowel sounds, no rebound/guarding  MUSCULOSKELETAL: No clubbing or cyanosis of digits  PSYCH: Awake and alert  NEUROLOGY: Fine tremor in b/l upper extremities, intermittent jerking movements noted, +asterixis  SKIN: E AV    LABS:                        7.2    6.42  )-----------( 129      ( 18 Nov 2020 02:30 )             24.4     11-18    136  |  95<L>  |  70<H>  ----------------------------<  87  5.3   |  22  |  9.86<H>    Ca    9.3      18 Nov 2020 00:30    TPro  8.1  /  Alb  3.6  /  TBili  0.5  /  DBili  x   /  AST  28  /  ALT  14  /  AlkPhos  91  11-17    PT/INR - ( 17 Nov 2020 18:40 )   PT: 14.2 SEC;   INR: 1.26          PTT - ( 17 Nov 2020 18:40 )  PTT:28.4 SEC      RADIOLOGY & ADDITIONAL TESTS: No new imaging  Results Reviewed: Yes  Imaging Personally Reviewed:  Electrocardiogram Personally Reviewed:    COORDINATION OF CARE:  Care Discussed with Consultants/Other Providers [Y/N]:  Prior or Outpatient Records Reviewed [Y/N]:

## 2020-11-18 NOTE — PHYSICAL THERAPY INITIAL EVALUATION ADULT - CRITERIA FOR SKILLED THERAPEUTIC INTERVENTIONS
impairments found/therapy frequency/functional limitations in following categories/risk reduction/prevention/rehab potential/anticipated discharge recommendation/predicted duration of therapy intervention

## 2020-11-18 NOTE — H&P ADULT - PROBLEM SELECTOR PLAN 3
Likely metabolic in the setting of infection  - Antibiotics as above  - IV hydration Likely metabolic in the setting of infection. Now resolving, appears to be close to baseline.   - Antibiotics as above  - C/w HD as per Renal to prevent worsening uremia  - Fall risk protocol, aspiration precautions

## 2020-11-18 NOTE — PROGRESS NOTE ADULT - PROBLEM SELECTOR PLAN 2
Given time course with initiation of Sunitinib, may be 2/2 medication (25-39% incidence per UTD). Possibly from IVC compression with necrotic lymph node or tumor invasion. Lactate improved from 8 to 1.7. No peritoneal signs on exam.  - Tylenol as needed  - Palliative care consulted  - US b/l LE venous dopplers ordered to r/o DVT given suprapubic/groin pain

## 2020-11-18 NOTE — CONSULT NOTE ADULT - SUBJECTIVE AND OBJECTIVE BOX
HPI:  75 year old man PMHx ESRD on HD (TTS), emphysema, renal cancer (diagnosed 3 months ago) on sunitinib, HTN, HLD presented to ED with sepsis and AMS.  Per conversation with his sister, Paolo, he couldn't walk for two days since last HD session on Saturday due to weakness. Had previously been in [facility] for a week, discharged to home Friday. He had been complaining of stomach pains. He has been receiving PO treatment for renal cancer (Sutent), prescribed by Dr. Castillo. He had been seeming more confused on the phone, per sister who talks to him almost every day, worsening over past month. Yesterday, sister had not heard from him so she called his HD center, where she was told he did not come to his session, so she called 911, who came to the house and reportedly found him on his couch confused and wet, and surrounded by bed bugs.  He became more alert in the ED, and able to share some story, which was consistent with the above, and was complaining of significant abdominal pain.   Denies fevers, chills, nausea, vomiting, diarrhea, melena, hematochezia, chest pain, SOB. Reports bleeding from his penis for the past month. Reportedly underwent cystoscopy, but was not told the source of the bleeding.     Pt awake, alert.  Able to say he has some abdominal pain.  Ate well this am.  +BM.  Unsure of what happened prior to admission.      PERTINENT PM/SXH:   Renal cancer    Enlarged prostate without lower urinary tract symptoms (luts)    Emphysema/COPD    Diverticulitis of colon    Aortic valve disorder    AV fistula infection, sequela    Arterial insufficiency of lower extremity    Dizziness    HLD (hyperlipidemia)    Tobacco abuse    COPD (chronic obstructive pulmonary disease)    ESRD (end stage renal disease)    BPH (benign prostatic hyperplasia)    PE (pulmonary embolism)    History of smoking 2 pac for 25 yrs , quit 7 yrs ago    Diverticulitis of colon    h/o emphysema    ESRD on hemodialysis    HTN (hypertension)      H/O unilateral nephrectomy    S/P kidney transplant    h/o AV fistula - left upper arm    History of colon resection - April 2012    h/o  LUE A-V fistula      FAMILY HISTORY:  FH: hypertension      ITEMS NOT CHECKED ARE NOT PRESENT    SOCIAL HISTORY:   Significant other/partner:  [ ]  Children:  [ ]  Lutheran/Spirituality:  Substance hx:  [ ]   Tobacco hx:  [ ]   Alcohol hx: [ ]   Home Opioid hx:  [ ] I-Stop Reference No:  Living Situation: [x ]Home  [ ]Long term care  [ ]Rehab [ ]Other    ADVANCE DIRECTIVES:    DNR  MOLST  [ ]  Living Will  [ ]   DECISION MAKER(s):  [ ] Health Care Proxy(s)  [ ] Surrogate(s)  [ ] Guardian           Name(s): Phone Number(s): Paolo Whyte [phone], Emily Ramesh [phone]    BASELINE (I)ADL(s) (prior to admission):  [place]: [ ]Total  [x ] Moderate [ ]Dependent    Allergies    No Known Allergies    Intolerances    MEDICATIONS  (STANDING):  piperacillin/tazobactam IVPB.. 3.375 Gram(s) IV Intermittent every 12 hours    MEDICATIONS  (PRN):  acetaminophen   Tablet .. 650 milliGRAM(s) Oral every 6 hours PRN Mild Pain (1 - 3), Moderate Pain (4 - 6)  aluminum hydroxide/magnesium hydroxide/simethicone Suspension 30 milliLiter(s) Oral every 4 hours PRN Dyspepsia    PRESENT SYMPTOMS: [ ]Unable to obtain due to poor mentation   Source if other than patient:  [ ]Family   [ ]Team     Pain (Impact on QOL):  yes  Location -     abdomen     Minimal acceptable level (0-10 scale):  4/10                   Aggrevating factors - unable to describe  Quality - dull  Radiation -none  Severity (0-10 scale) -  6/10  Timing - comes and goes    PAIN AD Score:     [email or web address] (press ctrl +  left click to view)    Dyspnea:                           [ ]Mild [ ]Moderate [ ]Severe  Anxiety:                             [ ]Mild [ ]Moderate [ ]Severe  Fatigue:                             [ ]Mild [x ]Moderate [ ]Severe  Nausea:                             [ ]Mild [ ]Moderate [ ]Severe  Loss of appetite:              [ ]Mild [ ]Moderate [ ]Severe  Constipation:                    [ ]Mild [ ]Moderate [ ]Severe    Other Symptoms:  [ x]All other review of systems negative     Karnofsky Performance Score/Palliative Performance Status Version 2:    80     %  PHYSICAL EXAM:  Vital Signs Last 24 Hrs  T(C): 36.6 (18 Nov 2020 14:23), Max: 38.6 (17 Nov 2020 18:47)  T(F): 97.9 (18 Nov 2020 14:23), Max: 101.5 (17 Nov 2020 18:47)  HR: 74 (18 Nov 2020 14:23) (74 - 107)  BP: 132/53 (18 Nov 2020 14:23) (122/47 - 153/84)  BP(mean): --  RR: 17 (18 Nov 2020 14:23) (14 - 18)  SpO2: 95% (18 Nov 2020 14:23) (95% - 100%) I&O's Summary  GENERAL:  [x ]Alert  [ x]Oriented x3   [ ]Lethargic  [ ]Cachexia  [ ]Unarousable  [ ]Verbal  [ ]Non-Verbal  Behavioral:   [ ] Anxiety  [ ] Delirium [ ] Agitation [ ] Other  HEENT:  [ x]Normal   [ ]Dry mouth   [ ]ET Tube/Trach  [ ]Oral lesions  PULMONARY:   [x ]Clear [ ]Tachypnea  [ ]Audible excessive secretions   [ ]Rhonchi        [ ]Right [ ]Left [ ]Bilateral  [ ]Crackles        [ ]Right [ ]Left [ ]Bilateral  [ ]Wheezing     [ ]Right [ ]Left [ ]Bilateral  CARDIOVASCULAR:    [ x]Regular [ ]Irregular [ ]Tachy  [ ]Froy [ ]Murmur [ ]Other  GASTROINTESTINAL:  [x ]Soft  [ ]Distended   [x ]+BS  [ ]Non tender [x ]Tender  [ ]PEG [ ]OGT/ NGT  Last BM: GENITOURINARY:  [ ]Normal [ ] Incontinent   [ x]Oliguria/Anuria   [ ]Marc  MUSCULOSKELETAL:   [ ]Normal   [x ]Weakness  [ ]Bed/Wheelchair bound [ ]Edema  NEUROLOGIC:   [x ]No focal deficits  [ ] Cognitive impairment  [ ] Dysphagia [ ]Dysarthria [ ] Paresis [ ]Other   SKIN:   [x]Normal   [ ]Pressure ulcer(s)  [ ]Rash    CRITICAL CARE:  [ ] Shock Present  [ ]Septic [ ]Cardiogenic [ ]Neurologic [ ]Hypovolemic  [ ]  Vasopressors [ ]  Inotropes   [ ] Respiratory failure present  [ ] Acute  [ ] Chronic [ ] Hypoxic  [ ] Hypercarbic [ ] Other  [ ] Other organ failure     LABS:                        7.2    6.42  )-----------( 129      ( 18 Nov 2020 02:30 )             24.4   11-18    136  |  95<L>  |  70<H>  ----------------------------<  87  5.3   |  22  |  9.86<H>    Ca    9.3      18 Nov 2020 00:30    TPro  8.1  /  Alb  3.6  /  TBili  0.5  /  DBili  x   /  AST  28  /  ALT  14  /  AlkPhos  91  11-17  PT/INR - ( 17 Nov 2020 18:40 )   PT: 14.2 SEC;   INR: 1.26          PTT - ( 17 Nov 2020 18:40 )  PTT:28.4 SEC      RADIOLOGY & ADDITIONAL STUDIES:    PROTEIN CALORIE MALNUTRITION:   [ ] PPSV2 < or = to 30% [ ] significant weight loss  [ ] poor nutritional intake [ ] catabolic state [ ] anasarca     Albumin, Serum: 3.6 g/dL (11-17-20 @ 18:40)  Artificial Nutrition [ ]     REFERRALS:   [ ]Chaplaincy  [ ] Hospice  [ ]Child Life  [ ]Social Work  [ ]Case management [ ]Holistic Therapy   Goals of Care Discussion Document:

## 2020-11-18 NOTE — CONSULT NOTE ADULT - SUBJECTIVE AND OBJECTIVE BOX
[facility] - S Monico / Mayi S /D Terra/ ERNESTINE Buchanan/ ERNESTINE Snow/ Allan Ortiz / KIERA Menchacau/ O Fang  service -[phone], office [phone]  ---------------------------------------------------------------------------------------------------------------    Patient is a 75y Male whom presented to the hospital with   Denied recent NSAID use/Abx use/iv contrast studies.    Patient seen and examined    PAST MEDICAL & SURGICAL HISTORY:  Renal cancer    Enlarged prostate without lower urinary tract symptoms (luts)    Emphysema/COPD    Diverticulitis of colon    Aortic valve disorder    AV fistula infection, sequela    Arterial insufficiency of lower extremity  RLE    Dizziness    HLD (hyperlipidemia)    Tobacco abuse  Quit in 2005, Smoked 2.5 PPD for 15 years    COPD (chronic obstructive pulmonary disease)    ESRD (end stage renal disease)    BPH (benign prostatic hyperplasia)    PE (pulmonary embolism)    History of smoking 2 pac for 25 yrs , quit 7 yrs ago    Diverticulitis of colon    HTN (hypertension)    H/O unilateral nephrectomy  9/2013    S/P kidney transplant  09/2013 in [place]    h/o AV fistula - left upper arm    History of colon resection - April 2012    h/o  LUE A-V fistula        Allergies: No Known Allergies    Home Medications Reviewed  Hospital Medications:   MEDICATIONS  (STANDING):  epoetin katlyn-epbx (RETACRIT) Injectable 33035 Unit(s) IV Push <User Schedule>  piperacillin/tazobactam IVPB.. 3.375 Gram(s) IV Intermittent every 12 hours    SOCIAL HISTORY:  Denies ETOh,Smoking, illicit drug use  FAMILY HISTORY:  FH: hypertension        REVIEW OF SYSTEMS:  CONSTITUTIONAL: No weakness, fevers or chills  EYES/ENT: No visual changes;  No vertigo or throat pain   NECK: No pain or stiffness  RESPIRATORY: No cough, wheezing, hemoptysis; No shortness of breath  CARDIOVASCULAR: No chest pain or palpitations.  GASTROINTESTINAL: No abdominal or epigastric pain. No nausea, vomiting, or hematemesis; No diarrhea or constipation. No melena or hematochezia.  GENITOURINARY: No dysuria, frequency, foamy urine, urinary urgency, incontinence or hematuria  NEUROLOGICAL: No numbness or weakness  SKIN: No itching, burning, rashes, or lesions   VASCULAR: No bilateral lower extremity edema.   All other review of systems is negative unless indicated above.    VITALS:  T(F): 97.9 (11-18-20 @ 14:23), Max: 101.5 (11-17-20 @ 18:47)  HR: 74 (11-18-20 @ 14:23)  BP: 132/53 (11-18-20 @ 14:23)  RR: 17 (11-18-20 @ 14:23)  SpO2: 95% (11-18-20 @ 14:23)  Wt(kg): --    Height (cm): 185.4 (11-18 @ 06:50)  Weight (kg): 80 (11-18 @ 06:50)  BMI (kg/m2): 23.3 (11-18 @ 06:50)  BSA (m2): 2.04 (11-18 @ 06:50)    PHYSICAL EXAM:  Constitutional: NAD  HEENT: anicteric sclera, oropharynx clear, MMM  Neck: No JVD  Respiratory: CTAB, no wheezes, rales or rhonchi  Cardiovascular: S1, S2, RRR  Gastrointestinal: BS+, soft, NT/ND  Extremities: No cyanosis or clubbing. No peripheral edema  Neurological: A/O x 3, no focal deficits  Psychiatric: Normal mood, normal affect  : No CVA tenderness. No wheatley.   Skin: No rashes  Vascular Access: RIDDHI AVF+thrill    LABS:  11-18    136  |  95<L>  |  70<H>  ----------------------------<  87  5.3   |  22  |  9.86<H>    Ca    9.3      18 Nov 2020 00:30    TPro  8.1  /  Alb  3.6  /  TBili  0.5  /  DBili      /  AST  28  /  ALT  14  /  AlkPhos  91  11-17    Creatinine Trend: 9.86 <--, 9.92 <--                        7.2    6.42  )-----------( 129      ( 18 Nov 2020 02:30 )             24.4     Urine Studies:        RADIOLOGY & ADDITIONAL STUDIES:                 [facility] - S Monico / Mayi S /D Terra/ S Chanel/ ERNESTINE Snow/ Allan Ortiz / KIERA Menchacau/ O Fang  service -[phone], office [phone]  ---------------------------------------------------------------------------------------------------------------  Patient seen and examined bedside, earlier this afternoon     75M PMHx ESRD on HD (TTS), emphysema, renal cancer (diagnosed 3 months ago) on sunitinib, HTN, HLD presented to ED with sepsis and AMS. pt had T 101.5, , Lactate 8 on admission. was admitted for SIRS. Renal consulted for ESRD Mx. Pt well known to me, our group HD pt. Pt last dialyzed 11/14 outpt, missed HD yesterday -stated "b'se I was here in the hospital". pt currently alert, oriented. denied sob/cp.     PAST MEDICAL & SURGICAL HISTORY:  Renal cancer    Enlarged prostate without lower urinary tract symptoms (luts)    Emphysema/COPD    Diverticulitis of colon    Aortic valve disorder    AV fistula infection, sequela    Arterial insufficiency of lower extremity  RLE    Dizziness    HLD (hyperlipidemia)    Tobacco abuse  Quit in 2005, Smoked 2.5 PPD for 15 years    COPD (chronic obstructive pulmonary disease)    ESRD (end stage renal disease)    BPH (benign prostatic hyperplasia)    PE (pulmonary embolism)    History of smoking 2 pac for 25 yrs , quit 7 yrs ago    Diverticulitis of colon    HTN (hypertension)    H/O unilateral nephrectomy  9/2013    S/P kidney transplant  09/2013 in [place]    h/o AV fistula - left upper arm    History of colon resection - April 2012    h/o  LUE A-V fistula    Allergies: No Known Allergies    Home Medications Reviewed  Hospital Medications:   MEDICATIONS  (STANDING):  epoetin katlyn-epbx (RETACRIT) Injectable 69065 Unit(s) IV Push <User Schedule>  piperacillin/tazobactam IVPB.. 3.375 Gram(s) IV Intermittent every 12 hours    SOCIAL HISTORY:  Denies ETOh,Smoking, illicit drug use  FAMILY HISTORY:  FH: hypertension      REVIEW OF SYSTEMS:  CONSTITUTIONAL: No weakness, fevers or chills  EYES/ENT: No visual changes;   NECK: No pain or stiffness  RESPIRATORY: No cough, wheezing, hemoptysis; No shortness of breath  CARDIOVASCULAR: No chest pain or palpitations.  GASTROINTESTINAL: No abdominal or epigastric pain. No nausea, vomiting, or hematemesis; No diarrhea or constipation. No melena or hematochezia.  NEUROLOGICAL: No numbness or weakness   VASCULAR: No bilateral lower extremity edema.   All other review of systems is negative unless indicated above.    VITALS:  T(F): 97.9 (11-18-20 @ 14:23), Max: 101.5 (11-17-20 @ 18:47)  HR: 74 (11-18-20 @ 14:23)  BP: 132/53 (11-18-20 @ 14:23)  RR: 17 (11-18-20 @ 14:23)  SpO2: 95% (11-18-20 @ 14:23)  Wt(kg): --    Height (cm): 185.4 (11-18 @ 06:50)  Weight (kg): 80 (11-18 @ 06:50)  BMI (kg/m2): 23.3 (11-18 @ 06:50)  BSA (m2): 2.04 (11-18 @ 06:50)    PHYSICAL EXAM:  Constitutional: NAD, obese  HEENT: anicteric sclera  Neck: No JVD  Respiratory: CTAB, no wheezes, rales or rhonchi  Cardiovascular: S1, S2, RRR  Gastrointestinal: BS+, soft, NT/ND  Extremities: no peripheral edema.   Neurological: A/O x 3  Psychiatric: Normal mood, normal affect  : No wheatley.   Vascular Access: RIDDHI AVF+thrill    LABS:  11-18    136  |  95<L>  |  70<H>  ----------------------------<  87  5.3   |  22  |  9.86<H>    Ca    9.3      18 Nov 2020 00:30    TPro  8.1  /  Alb  3.6  /  TBili  0.5  /  DBili      /  AST  28  /  ALT  14  /  AlkPhos  91  11-17    Creatinine Trend: 9.86 <--, 9.92 <--                        7.2    6.42  )-----------( 129      ( 18 Nov 2020 02:30 )             24.4     Urine Studies:    RADIOLOGY & ADDITIONAL STUDIES:      < from: Xray Chest 1 View-PORTABLE IMMEDIATE (Xray Chest 1 View-PORTABLE IMMEDIATE .) (11.17.20 @ 19:35) >    IMPRESSION: No acute pulmonary pathology.    < end of copied text >

## 2020-11-18 NOTE — PROGRESS NOTE ADULT - PROBLEM SELECTOR PLAN 9
DVT PPx holding d/t anemia and recent blood loss, but if stable would need given malignancy.  Diet- renal replacement/DASH  Dispo: PT consult. Patient at this time may not be safe to live on his own- will need to reach out to family

## 2020-11-18 NOTE — PROGRESS NOTE ADULT - ASSESSMENT
75M PMHx ESRD on HD (TTS), emphysema, renal cancer (diagnosed 3 months ago) on sunitinib, HTN, HLD presented to ED with sepsis and AMS.

## 2020-11-18 NOTE — H&P ADULT - NSHPPHYSICALEXAM_GEN_ALL_CORE
PHYSICAL EXAM:  Vital Signs Last 24 Hrs  T(C): 36.7 (11-18-20 @ 06:50)  T(F): 98 (11-18-20 @ 06:50), Max: 101.5 (11-17-20 @ 18:47)  HR: 80 (11-18-20 @ 06:50) (80 - 107)  BP: 130/60 (11-18-20 @ 06:50)  BP(mean): --  RR: 18 (11-18-20 @ 06:50) (14 - 18)  SpO2: 99% (11-18-20 @ 06:50) (99% - 100%)  Wt(kg): --    Constitutional: NAD, awake and alert, chronically-ill appearing man  EYES: EOMI, darkened sclera  ENT:  Normal Hearing, no tonsillar exudates, dry mucous membranes, poor dentition  Neck: Soft and supple  Respiratory: Breath sounds are clear bilaterally, No wheezing, rales or rhonchi  Cardiovascular: S1 and S2, regular rate and rhythm, no Murmurs, gallops or rubs, no JVD,    Gastrointestinal: Bowel Sounds present, soft, nontender, nondistended, no guarding, no rebound  Extremities: No cyanosis or clubbing; warm to touch  Vascular: 2+ peripheral pulses lower ex  Neurological: No focal deficits, CN II-XII intact bilaterally, sensation to light touch intact in all extremities, gait intact. Pupils are equally reactive to light and symmetrical in size.   Musculoskeletal: 5/5 strength b/l upper and lower extremities; no joint swelling.  Skin: No rashes  Psych: no depression or anhedonia, AAOx3  HEME: no bruises, no nose bleeds PHYSICAL EXAM:  Vital Signs Last 24 Hrs  T(C): 36.7 (11-18-20 @ 06:50)  T(F): 98 (11-18-20 @ 06:50), Max: 101.5 (11-17-20 @ 18:47)  HR: 80 (11-18-20 @ 06:50) (80 - 107)  BP: 130/60 (11-18-20 @ 06:50)  BP(mean): --  RR: 18 (11-18-20 @ 06:50) (14 - 18)  SpO2: 99% (11-18-20 @ 06:50) (99% - 100%)  Wt(kg): --    Constitutional: NAD, awake and alert, chronically-ill appearing man  EYES: EOMI, darkened sclera  ENT:  Normal Hearing, no tonsillar exudates, dry mucous membranes, poor dentition  Neck: Soft and supple  Respiratory: Breath sounds are clear bilaterally, No wheezing, rales or rhonchi  Cardiovascular: S1 and S2, regular rate and rhythm, no Murmurs, gallops or rubs, no JVD,    Gastrointestinal: Bowel Sounds present, soft, marked RUQ tenderness + suprapubic tenderness, nondistended, no guarding, no rebound. Well-healed post-surgical scars.   Extremities: No cyanosis or clubbing; warm to touch  Vascular: 2+ peripheral pulses lower ex. RUE fistula with palpable thrill  Neurological: No focal deficits, CN II-XII intact bilaterally, sensation to light touch intact in all extremities. Pupils are equally reactive to light and symmetrical in size.   Musculoskeletal: 4/5 strength b/l upper and lower extremities; no joint swelling.  Skin: No rashes, multiple well-healed scars  Psych: AAOx2-3, some overall confusion  HEME: no bruises, no nose bleeds PHYSICAL EXAM:  Vital Signs Last 24 Hrs  T(C): 36.7 (11-18-20 @ 06:50)  T(F): 98 (11-18-20 @ 06:50), Max: 101.5 (11-17-20 @ 18:47)  HR: 80 (11-18-20 @ 06:50) (80 - 107)  BP: 130/60 (11-18-20 @ 06:50)  BP(mean): --  RR: 18 (11-18-20 @ 06:50) (14 - 18)  SpO2: 99% (11-18-20 @ 06:50) (99% - 100%)  Wt(kg): --    Constitutional: NAD, awake and alert, chronically-ill appearing man  EYES: EOMI, darkened sclera  ENT:  Normal Hearing, no tonsillar exudates, dry mucous membranes, poor dentition  Neck: Soft and supple  Respiratory: Breath sounds are clear bilaterally, No wheezing, rales or rhonchi  Cardiovascular: S1 and S2, regular rate and rhythm, no Murmurs, gallops or rubs, no JVD,    Gastrointestinal: Bowel Sounds present, soft, marked RUQ tenderness + suprapubic tenderness, nondistended, no guarding, no rebound. Well-healed post-surgical scars.   Extremities: No cyanosis or clubbing; warm to touch  Vascular: 2+ peripheral pulses lower ex. RUE fistula with palpable thrill  Neurological: No focal deficits, CN II-XII intact bilaterally, sensation to light touch intact in all extremities. Pupils are equally reactive to light and symmetrical in size.   Musculoskeletal: 4/5 strength b/l upper and lower extremities; no joint swelling.  Skin: No rashes, multiple well-healed scars  Psych: AAOx2-3, some overall confusion regarding past few days  HEME: no bruises, no nose bleeds PHYSICAL EXAM:  Vital Signs Last 24 Hrs  T(C): 36.7 (11-18-20 @ 06:50)  T(F): 98 (11-18-20 @ 06:50), Max: 101.5 (11-17-20 @ 18:47)  HR: 80 (11-18-20 @ 06:50) (80 - 107)  BP: 130/60 (11-18-20 @ 06:50)  BP(mean): --  RR: 18 (11-18-20 @ 06:50) (14 - 18)  SpO2: 99% (11-18-20 @ 06:50) (99% - 100%)  Wt(kg): --    Constitutional: NAD, awake and alert, chronically-ill appearing man  EYES: EOMI, darkened sclera  ENT:  Normal Hearing, no tonsillar exudates, dry mucous membranes, poor dentition  Neck: Soft and supple  Respiratory: Breath sounds are clear bilaterally, No wheezing, rales or rhonchi  Cardiovascular: S1 and S2, regular rate and rhythm, no Murmurs, gallops or rubs, no JVD,    Gastrointestinal: Bowel Sounds present, soft, marked RUQ tenderness + suprapubic tenderness, nondistended, no guarding, no rebound. Well-healed post-surgical scars.   Extremities: No cyanosis or clubbing; warm to touch  Vascular: 2+ peripheral pulses lower ex. RUE fistula with palpable thrill  Neurological: No focal deficits, CN II-XII intact bilaterally, sensation to light touch intact in all extremities. Pupils are equally reactive to light and symmetrical in size.   Musculoskeletal: 4/5 strength b/l lower extremities; no joint swelling.  Skin: No rashes, multiple well-healed scars  Psych: AAOx2-3, some overall confusion regarding past few days  HEME: no bruises, no nose bleeds PHYSICAL EXAM:  Vital Signs Last 24 Hrs  T(C): 36.7 (11-18-20 @ 06:50)  T(F): 98 (11-18-20 @ 06:50), Max: 101.5 (11-17-20 @ 18:47)  HR: 80 (11-18-20 @ 06:50) (80 - 107)  BP: 130/60 (11-18-20 @ 06:50)  BP(mean): --  RR: 18 (11-18-20 @ 06:50) (14 - 18)  SpO2: 99% (11-18-20 @ 06:50) (99% - 100%)  Wt(kg): --    Constitutional: NAD, awake and alert, chronically-ill appearing man  Eyes: EOMI, darkened sclera  Mouth: No tonsillar exudates, dry mucous membranes, poor dentition  Neck: Soft and supple, no JVD, no LAD, trachea midline  Respiratory: Nonlabored breathing. Breath sounds are clear bilaterally. No wheezing, rales or rhonchi.  Cardiovascular: S1 and S2, regular rate and rhythm, no murmurs, gallops or rubs, no LE edema b/l.  Gastrointestinal: Bowel Sounds present, soft, marked RUQ tenderness, + suprapubic tenderness, nondistended, no guarding, no rebound. Well-healed post-surgical scars.   Extremities: No cyanosis or clubbing, warm to touch, 2+ peripheral pulses b/l, RUE fistula with palpable thrill and audible bruit  Neurological: AAOx2-3, some overall confusion regarding past few days, CN II-XII intact bilaterally, sensation to light touch intact in all extremities, 4/5 strength b/l lower extremities.Pupils are equally reactive to light and symmetrical in size.   Musculoskeletal: No spinal or paraspinal tenderness, no joint swelling  Skin: No rashes, multiple well-healed scars

## 2020-11-18 NOTE — H&P ADULT - PROBLEM SELECTOR PLAN 5
Anuric, on HD TTS, missed Tuesday session. Dc'd standing fluids  - Nephrology consulted overnight with no urgent need for HD, should get dialyzed today. Anuric, on HD TTS, missed Tuesday session. Dc'd standing fluids  - Nephrology consulted overnight with no urgent need for HD, should get dialyzed today.  - Was getting fluids overnight Anuric, on HD TTS, missed Tuesday session. DC'd standing fluids started in ED.  - Nephrology consulted overnight with no urgent need for HD, should get dialyzed today  - Was getting fluids overnight but not volume overloaded on exam  - C/w HD as per Renal

## 2020-11-18 NOTE — CONSULT NOTE ADULT - PROBLEM SELECTOR RECOMMENDATION 2
pt taking oral chemo   now with progressive disease despite medications  primary team to reach out to oncology re: further dmt

## 2020-11-18 NOTE — CONSULT NOTE ADULT - ASSESSMENT
75 year old man PMHx ESRD on HD (TTS), emphysema, renal cancer (diagnosed 3 months ago) on sunitinib, HTN, HLD presented to ED with sepsis and AMS.  Asked to see GOC

## 2020-11-18 NOTE — H&P ADULT - NSHPLABSRESULTS_GEN_ALL_CORE
7.2    6.42  )-----------( 129      ( 18 Nov 2020 02:30 )             24.4     11-18    136  |  95<L>  |  70<H>  ----------------------------<  87  5.3   |  22  |  9.86<H>    Ca    9.3      18 Nov 2020 00:30    TPro  8.1  /  Alb  3.6  /  TBili  0.5  /  DBili  x   /  AST  28  /  ALT  14  /  AlkPhos  91  11-17    Troponin T, High Sensitivity: 160: Delta: 187 on 11/17/    Blood Gas Venous Comprehensive (11.18.20 @ 00:30)   Blood Gas Venous - Creatinine: Test not performed mg/dL   pH, Venous: 7.40 pH   Blood Gas Venous - Lactate: 1.7: Please note updated reference range. mmol/L   Blood Gas Venous - Chloride: 97 mmol/L   pCO2, Venous: 43 mmHg   pO2, Venous: 43 mmHg   HCO3, Venous: 26 mmol/L   Blood Gas Venous - FIO2: 21   Base Excess, Venous: 1.5: REFERENCE RANGE = -3 + 2 mmol/L mmol/L   Oxygen Saturation, Venous: 78.9 %   Blood Gas Venous - Sodium: 134 mmol/L   Blood Gas Venous - Potassium: 4.9 mmol/L   Blood Gas Venous - Glucose: 89 mg/dL   Blood Gas Venous - Hemoglobin: 6.5 g/dL   Blood Gas Venous - Hematocrit: 20.3: Delta: 29.2 on 11/17/     < from: CT Abdomen and Pelvis w/ IV Cont (11.17.20 @ 20:03) >    IMPRESSION:  Heterogeneous right upper pole renal mass most likely representing a renal cell carcinoma. Heterogeneous enlargement of the right kidney from 3/8/2017, may represent infiltrating disease with superimposed infection not entirely excluded. Suspect tumoral invasion of the right renal vein, however, limitedly assessed. Mass effect with narrowing of the IVC without distinct fat plane. Contrast enhanced MR may be obtained on a nonemergent basis for more detailed evaluation if there are no contraindications.    Large right retrocaval mass, as above, possibly representing necrotic lymph node with mass effect on the IVC.    Liver metastasis.    < end of copied text > 7.2    6.42  )-----------( 129      ( 18 Nov 2020 02:30 )             24.4     11-18    136  |  95<L>  |  70<H>  ----------------------------<  87  5.3   |  22  |  9.86<H>    Ca    9.3      18 Nov 2020 00:30    TPro  8.1  /  Alb  3.6  /  TBili  0.5  /  DBili  x   /  AST  28  /  ALT  14  /  AlkPhos  91  11-17    Troponin T, High Sensitivity: 160: Delta: 187 on 11/17/    Blood Gas Venous Comprehensive (11.18.20 @ 00:30)   Blood Gas Venous - Creatinine: Test not performed mg/dL   pH, Venous: 7.40 pH   Blood Gas Venous - Lactate: 1.7: Please note updated reference range. mmol/L   Blood Gas Venous - Chloride: 97 mmol/L   pCO2, Venous: 43 mmHg   pO2, Venous: 43 mmHg   HCO3, Venous: 26 mmol/L   Blood Gas Venous - FIO2: 21   Base Excess, Venous: 1.5: REFERENCE RANGE = -3 + 2 mmol/L mmol/L   Oxygen Saturation, Venous: 78.9 %   Blood Gas Venous - Sodium: 134 mmol/L   Blood Gas Venous - Potassium: 4.9 mmol/L   Blood Gas Venous - Glucose: 89 mg/dL   Blood Gas Venous - Hemoglobin: 6.5 g/dL   Blood Gas Venous - Hematocrit: 20.3: Delta: 29.2 on 11/17/     Imaging personally reviewed.   < from: CT Abdomen and Pelvis w/ IV Cont (11.17.20 @ 20:03) >    IMPRESSION:  Heterogeneous right upper pole renal mass most likely representing a renal cell carcinoma. Heterogeneous enlargement of the right kidney from 3/8/2017, may represent infiltrating disease with superimposed infection not entirely excluded. Suspect tumoral invasion of the right renal vein, however, limitedly assessed. Mass effect with narrowing of the IVC without distinct fat plane. Contrast enhanced MR may be obtained on a nonemergent basis for more detailed evaluation if there are no contraindications.    Large right retrocaval mass, as above, possibly representing necrotic lymph node with mass effect on the IVC.    Liver metastasis.    < end of copied text >

## 2020-11-19 DIAGNOSIS — R06.6 HICCOUGH: ICD-10-CM

## 2020-11-19 LAB
ANION GAP SERPL CALC-SCNC: 14 MMO/L — SIGNIFICANT CHANGE UP (ref 7–14)
BUN SERPL-MCNC: 44 MG/DL — HIGH (ref 7–23)
CALCIUM SERPL-MCNC: 8.8 MG/DL — SIGNIFICANT CHANGE UP (ref 8.4–10.5)
CHLORIDE SERPL-SCNC: 95 MMOL/L — LOW (ref 98–107)
CO2 SERPL-SCNC: 29 MMOL/L — SIGNIFICANT CHANGE UP (ref 22–31)
CREAT SERPL-MCNC: 6.55 MG/DL — HIGH (ref 0.5–1.3)
FERRITIN SERPL-MCNC: 4369 NG/ML — HIGH (ref 30–400)
FOLATE SERPL-MCNC: 5.4 NG/ML — SIGNIFICANT CHANGE UP (ref 4.7–20)
GLUCOSE SERPL-MCNC: 90 MG/DL — SIGNIFICANT CHANGE UP (ref 70–99)
HBV CORE AB SER-ACNC: NONREACTIVE — SIGNIFICANT CHANGE UP
HBV SURFACE AB SER-ACNC: <3 MLU/ML — LOW
HCT VFR BLD CALC: 24 % — LOW (ref 39–50)
HCV AB S/CO SERPL IA: 0.07 S/CO — SIGNIFICANT CHANGE UP (ref 0–0.99)
HCV AB SERPL-IMP: SIGNIFICANT CHANGE UP
HGB BLD-MCNC: 7.1 G/DL — LOW (ref 13–17)
IRON SATN MFR SERPL: 113 UG/DL — LOW (ref 155–535)
IRON SATN MFR SERPL: 25 UG/DL — LOW (ref 45–165)
MAGNESIUM SERPL-MCNC: 2.2 MG/DL — SIGNIFICANT CHANGE UP (ref 1.6–2.6)
MCHC RBC-ENTMCNC: 23.8 PG — LOW (ref 27–34)
MCHC RBC-ENTMCNC: 29.6 % — LOW (ref 32–36)
MCV RBC AUTO: 80.5 FL — SIGNIFICANT CHANGE UP (ref 80–100)
NRBC # FLD: 0 K/UL — SIGNIFICANT CHANGE UP (ref 0–0)
PHOSPHATE SERPL-MCNC: 4.5 MG/DL — SIGNIFICANT CHANGE UP (ref 2.5–4.5)
PLATELET # BLD AUTO: 137 K/UL — LOW (ref 150–400)
PMV BLD: 9.7 FL — SIGNIFICANT CHANGE UP (ref 7–13)
POTASSIUM SERPL-MCNC: 4.3 MMOL/L — SIGNIFICANT CHANGE UP (ref 3.5–5.3)
POTASSIUM SERPL-SCNC: 4.3 MMOL/L — SIGNIFICANT CHANGE UP (ref 3.5–5.3)
RBC # BLD: 2.98 M/UL — LOW (ref 4.2–5.8)
RBC # FLD: 23.9 % — HIGH (ref 10.3–14.5)
SODIUM SERPL-SCNC: 138 MMOL/L — SIGNIFICANT CHANGE UP (ref 135–145)
UIBC SERPL-MCNC: 87.9 UG/DL — LOW (ref 110–370)
VIT B12 SERPL-MCNC: 688 PG/ML — SIGNIFICANT CHANGE UP (ref 200–900)
WBC # BLD: 4.97 K/UL — SIGNIFICANT CHANGE UP (ref 3.8–10.5)
WBC # FLD AUTO: 4.97 K/UL — SIGNIFICANT CHANGE UP (ref 3.8–10.5)

## 2020-11-19 PROCEDURE — 99233 SBSQ HOSP IP/OBS HIGH 50: CPT

## 2020-11-19 PROCEDURE — 99233 SBSQ HOSP IP/OBS HIGH 50: CPT | Mod: GC

## 2020-11-19 PROCEDURE — 93970 EXTREMITY STUDY: CPT | Mod: 26

## 2020-11-19 RX ORDER — POLYETHYLENE GLYCOL 3350 17 G/17G
17 POWDER, FOR SOLUTION ORAL DAILY
Refills: 0 | Status: DISCONTINUED | OUTPATIENT
Start: 2020-11-19 | End: 2020-12-01

## 2020-11-19 RX ORDER — ALBUTEROL 90 UG/1
2 AEROSOL, METERED ORAL EVERY 6 HOURS
Refills: 0 | Status: DISCONTINUED | OUTPATIENT
Start: 2020-11-19 | End: 2020-12-01

## 2020-11-19 RX ORDER — SENNA PLUS 8.6 MG/1
2 TABLET ORAL AT BEDTIME
Refills: 0 | Status: DISCONTINUED | OUTPATIENT
Start: 2020-11-19 | End: 2020-12-01

## 2020-11-19 RX ORDER — TIOTROPIUM BROMIDE 18 UG/1
1 CAPSULE ORAL; RESPIRATORY (INHALATION) DAILY
Refills: 0 | Status: DISCONTINUED | OUTPATIENT
Start: 2020-11-19 | End: 2020-12-01

## 2020-11-19 RX ORDER — OXYCODONE HYDROCHLORIDE 5 MG/1
5 TABLET ORAL EVERY 4 HOURS
Refills: 0 | Status: DISCONTINUED | OUTPATIENT
Start: 2020-11-19 | End: 2020-11-26

## 2020-11-19 RX ORDER — HEPARIN SODIUM 5000 [USP'U]/ML
5000 INJECTION INTRAVENOUS; SUBCUTANEOUS EVERY 8 HOURS
Refills: 0 | Status: DISCONTINUED | OUTPATIENT
Start: 2020-11-19 | End: 2020-12-01

## 2020-11-19 RX ADMIN — Medication 30 MILLILITER(S): at 04:24

## 2020-11-19 RX ADMIN — Medication 30 MILLILITER(S): at 13:50

## 2020-11-19 RX ADMIN — CHLORHEXIDINE GLUCONATE 1 APPLICATION(S): 213 SOLUTION TOPICAL at 10:53

## 2020-11-19 RX ADMIN — SENNA PLUS 2 TABLET(S): 8.6 TABLET ORAL at 21:27

## 2020-11-19 RX ADMIN — PIPERACILLIN AND TAZOBACTAM 25 GRAM(S): 4; .5 INJECTION, POWDER, LYOPHILIZED, FOR SOLUTION INTRAVENOUS at 17:39

## 2020-11-19 RX ADMIN — TIOTROPIUM BROMIDE 1 CAPSULE(S): 18 CAPSULE ORAL; RESPIRATORY (INHALATION) at 13:51

## 2020-11-19 RX ADMIN — HEPARIN SODIUM 5000 UNIT(S): 5000 INJECTION INTRAVENOUS; SUBCUTANEOUS at 13:48

## 2020-11-19 RX ADMIN — Medication 30 MILLILITER(S): at 22:59

## 2020-11-19 RX ADMIN — PIPERACILLIN AND TAZOBACTAM 25 GRAM(S): 4; .5 INJECTION, POWDER, LYOPHILIZED, FOR SOLUTION INTRAVENOUS at 06:30

## 2020-11-19 RX ADMIN — HEPARIN SODIUM 5000 UNIT(S): 5000 INJECTION INTRAVENOUS; SUBCUTANEOUS at 21:27

## 2020-11-19 RX ADMIN — POLYETHYLENE GLYCOL 3350 17 GRAM(S): 17 POWDER, FOR SOLUTION ORAL at 17:39

## 2020-11-19 NOTE — PROGRESS NOTE ADULT - ATTENDING COMMENTS
Yosi See MD  [facility]  Office [phone]  Ans Serv [phone] cell [phone]

## 2020-11-19 NOTE — PROGRESS NOTE ADULT - PROBLEM SELECTOR PLAN 3
Likely metabolic in the setting of infection. Now resolving, appears to be at baseline  - Antibiotics as above  - Noted to have tremor of b/l upper extremities, ammonia level normal

## 2020-11-19 NOTE — PROGRESS NOTE ADULT - PROBLEM SELECTOR PLAN 4
Collateral information obtained from patient's oncologist Dr. 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 He had CT imaging consistent with liver metastases at the time  -Patient was prescribed Sutinib in July 2020, which he had trouble obtaining due to its cost- had poor follow up and was not seen again in the office until September when cost was addressed- did not start Sutinib until October 11th  -Previous imaging showed sclerotic lesion in T8, with possible other bony metastases- an MRI of the spine was ordered, which patient did not get done  -Patient 1 month ago complained of worsening dizziness, MRI head was ordered to r/o metastatic disease, which patient did not get done  -Per Dr. Castillo, palliative care is appropriate, although it is hard to say if this is progression of disease on current chemotherapy given questionable patient compliance and delay in starting treatment since initial scans were done  -Will order MRI of the head and spine w/wo contrast while inpatient to assess for metastatic disease  -Hold Sutinib in setting of infection

## 2020-11-19 NOTE — PROGRESS NOTE ADULT - PROBLEM SELECTOR PLAN 3
pt tolerating HD  he did bring up stopping HD  he is aware of death if HD is stop  we also discussed that at some point his body may not tolerate HD anymore   He and his sister understood this concept  he wishes to continue if able for now

## 2020-11-19 NOTE — DIETITIAN INITIAL EVALUATION ADULT. - OTHER INFO
Per chart review patient with medical history of ESRD on dialysis, renal cancer (diagnosis 3 months ago), HTN, COPD, brought in by EMS after family called for not hearing from pt for several days admitted for sepsis, AMS. Patient confused at times, provided limited information. Reports eating well at lunch today. Tolerating diet. Denies chewing/swallowing difficulty. States he has "stomach pain" today but denies nausea/vomiting. Reports last BM was possibly 1 week ago.     Patient endorses weight loss prior to admission. Reports UBW ~79.5kg 1 year ago. Could not recall recent weight but reports losing " a lot of weight." Per bed-scale patient's current weight is 67.7kg. Indicates ~14.8% weight loss in 1 year. Encouraged PO intake as tolerated. Patient amenable to Nepro supplement to optimize energy/protein intake.

## 2020-11-19 NOTE — DIETITIAN INITIAL EVALUATION ADULT. - PERTINENT MEDS FT
aluminum hydroxide/magnesium hydroxide/simethicone Suspension PRN  epoetin katlyn-epbx (RETACRIT) Injectable  heparin   Injectable  oxyCODONE    IR PRN  piperacillin/tazobactam IVPB..  polyethylene glycol 3350  senna

## 2020-11-19 NOTE — PROGRESS NOTE ADULT - SUBJECTIVE AND OBJECTIVE BOX
Latha Hartman  [facility] Medicine  Pager #[phone]    Patient is a 75y old  Male who presents with a chief complaint of AMS (18 Nov 2020 18:40)      SUBJECTIVE / OVERNIGHT EVENTS: Patient seen and examined at bedside. Patient reports feeling well today. States that he hasn't been compliant with Sutinib because he has been in and out of the hospital.     ADDITIONAL REVIEW OF SYSTEMS:    MEDICATIONS  (STANDING):  chlorhexidine 4% Liquid 1 Application(s) Topical <User Schedule>  epoetin katlyn-epbx (RETACRIT) Injectable 18294 Unit(s) IV Push <User Schedule>  heparin   Injectable 5000 Unit(s) SubCutaneous every 8 hours  piperacillin/tazobactam IVPB.. 3.375 Gram(s) IV Intermittent every 12 hours  polyethylene glycol 3350 17 Gram(s) Oral daily  senna 2 Tablet(s) Oral at bedtime  tiotropium 18 MICROgram(s) Capsule 1 Capsule(s) Inhalation daily    MEDICATIONS  (PRN):  ALBUTerol    90 MICROgram(s) HFA Inhaler 2 Puff(s) Inhalation every 6 hours PRN Shortness of Breath and/or Wheezing  aluminum hydroxide/magnesium hydroxide/simethicone Suspension 30 milliLiter(s) Oral every 4 hours PRN Dyspepsia  oxyCODONE    IR 5 milliGRAM(s) Oral every 4 hours PRN Moderate to severe pain      CAPILLARY BLOOD GLUCOSE        I&O's Summary    18 Nov 2020 07:01  -  19 Nov 2020 07:00  --------------------------------------------------------  IN: 400 mL / OUT: 1900 mL / NET: -1500 mL        PHYSICAL EXAM:    Vital Signs Last 24 Hrs  T(C): 36.4 (19 Nov 2020 06:28), Max: 37 (18 Nov 2020 20:50)  T(F): 97.5 (19 Nov 2020 06:28), Max: 98.6 (18 Nov 2020 20:50)  HR: 83 (19 Nov 2020 06:28) (74 - 98)  BP: 107/50 (19 Nov 2020 06:28) (107/50 - 132/53)  BP(mean): --  RR: 18 (19 Nov 2020 06:28) (17 - 18)  SpO2: 100% (19 Nov 2020 06:28) (95% - 100%)    CONSTITUTIONAL: NAD, well-developed, unkempt, thin  EYES: Conjunctiva and sclera clear  RESPIRATORY: Normal respiratory effort; lungs are clear to auscultation bilaterally  CARDIOVASCULAR: Regular rate and rhythm, normal S1 and S2, no murmur/rub/gallop; No lower extremity edema  ABDOMEN: Nontender to palpation, normoactive bowel sounds, no rebound/guarding  MUSCULOSKELETAL: No clubbing or cyanosis of digits  PSYCH: AAOx3  NEUROLOGY: Fine tremor in b/l upper extremities, hyperspasticity of LUE  SKIN: LUE AVF    LABS:                        7.1    4.97  )-----------( 137      ( 19 Nov 2020 05:40 )             24.0     11-19    138  |  95<L>  |  44<H>  ----------------------------<  90  4.3   |  29  |  6.55<H>    Ca    8.8      19 Nov 2020 05:40  Phos  4.5     11-19  Mg     2.2     11-19    TPro  8.1  /  Alb  3.6  /  TBili  0.5  /  DBili  x   /  AST  28  /  ALT  14  /  AlkPhos  91  11-17    PT/INR - ( 17 Nov 2020 18:40 )   PT: 14.2 SEC;   INR: 1.26          PTT - ( 17 Nov 2020 18:40 )  PTT:28.4 SEC      Culture - Blood (collected 17 Nov 2020 23:23)  Source: .Blood Blood-Venous  Preliminary Report (19 Nov 2020 01:02):    No growth to date.    Culture - Blood (collected 17 Nov 2020 23:23)  Source: .Blood Blood-Peripheral  Preliminary Report (19 Nov 2020 01:02):    No growth to date.    RADIOLOGY & ADDITIONAL TESTS: No new imaging  Results Reviewed: Yes  Imaging Personally Reviewed:  Electrocardiogram Personally Reviewed:    COORDINATION OF CARE:  Care Discussed with Consultants/Other Providers [Y/N]:  Prior or Outpatient Records Reviewed [Y/N]:

## 2020-11-19 NOTE — PROGRESS NOTE ADULT - PROBLEM SELECTOR PLAN 1
T 101.5, , Lactate 8 on admission. Possible source is intraabdominal given severe pain. Possible necrotic lymph node seen on CT  - C/w Zosyn  - F/u blood cultures- NGTD

## 2020-11-19 NOTE — DIETITIAN INITIAL EVALUATION ADULT. - DIET TYPE
1. Recommend Renal Replacement - No Protein Restr, No Conc K, No Conc Phos, Low Sodium (RENAL) diet (d/c DASH/TLC modification).

## 2020-11-19 NOTE — PROGRESS NOTE ADULT - PROBLEM SELECTOR PLAN 7
Hgb 8.4-> 7.2. Likely anemia of chronic disease given renal failure and malignancy vs blood loss. Noted recent bleeding from penis, s/p cystoscopy with no source. No active bleeding noted  - Maintain active type and screen  - Monitor H/H and transfuse to keep Hgb > 7  - Iron studies suggestive of anemia of chronic disease  - Folate and Vitamin B12 levels WNL

## 2020-11-19 NOTE — PROGRESS NOTE ADULT - SUBJECTIVE AND OBJECTIVE BOX
Nephrology Followup Note - [phone] - Dr See / Dr Marc / Dr Snow / Dr Ellison / Dr Buchanan / Dr Headley / Dr Ortiz / Dr Jamison  Pt seen and examined at bedside  No acute events overnight. Pt without complaints.  Tolerating meals. Mental status at baseline. Denies pain     Allergies:  No Known Allergies    Hospital Medications:   MEDICATIONS  (STANDING):  chlorhexidine 4% Liquid 1 Application(s) Topical <User Schedule>  epoetin katlyn-epbx (RETACRIT) Injectable 00509 Unit(s) IV Push <User Schedule>  heparin   Injectable 5000 Unit(s) SubCutaneous every 8 hours  piperacillin/tazobactam IVPB.. 3.375 Gram(s) IV Intermittent every 12 hours  polyethylene glycol 3350 17 Gram(s) Oral daily  senna 2 Tablet(s) Oral at bedtime  tiotropium 18 MICROgram(s) Capsule 1 Capsule(s) Inhalation daily    VITALS:  T(F): 97.5 (11-19-20 @ 15:06), Max: 98.6 (11-18-20 @ 20:50)  HR: 93 (11-19-20 @ 15:06)  BP: 113/38 (11-19-20 @ 15:06)  RR: 18 (11-19-20 @ 15:06)  SpO2: 94% (11-19-20 @ 15:06)  Wt(kg): --    11-18 @ 07:01  -  11-19 @ 07:00  --------------------------------------------------------  IN: 400 mL / OUT: 1900 mL / NET: -1500 mL        PHYSICAL EXAM:  Constitutional: NAD  HEENT: anicteric sclera, oropharynx clear, MMM  Neck: No JVD  Respiratory: CTAB, no wheezes, rales or rhonchi  Cardiovascular: S1, S2, RRR  Gastrointestinal: BS+, soft, NT/ND  Extremities: No cyanosis or clubbing. No peripheral edema  Neurological: A/O x 3, no focal deficits  Psychiatric: Normal mood, normal affect  : No CVA tenderness. No wheatley.   Skin: No rashes  Vascular Access: LUE AV access +thrill and bruit.     LABS:  11-19    138  |  95<L>  |  44<H>  ----------------------------<  90  4.3   |  29  |  6.55<H>    Ca    8.8      19 Nov 2020 05:40  Phos  4.5     11-19  Mg     2.2     11-19    TPro  8.1  /  Alb  3.6  /  TBili  0.5  /  DBili      /  AST  28  /  ALT  14  /  AlkPhos  91  11-17    Creatinine Trend: 6.55 <--, 10.30 <--, 9.86 <--, 9.92 <--                        7.1    4.97  )-----------( 137      ( 19 Nov 2020 05:40 )             24.0     Urine Studies:      RADIOLOGY & ADDITIONAL STUDIES:

## 2020-11-19 NOTE — DIETITIAN INITIAL EVALUATION ADULT. - SIGNS/SYMPTOMS
severe muscle mass depletion, severe body fat loss, 14.8% weight loss in 1year As evidenced by ESRD on HD, catabolic illness (cancer)

## 2020-11-19 NOTE — PROGRESS NOTE ADULT - PROBLEM SELECTOR PLAN 2
spoke at length with pt and sister  pt not interested in continuing oral meds   he is aware that cancer is metastatic in nature and not curable

## 2020-11-19 NOTE — CHART NOTE - TREATMENT: THE FOLLOWING DIET HAS BEEN RECOMMENDED
Diet, Regular:   For patients receiving Renal Replacement - No Protein Restr, No Conc K, No Conc Phos, Low Sodium (RENAL) (11-19-20 @ 11:57) [Active]    Add Nepro 3x daily (1,275 kcals, 52.3g protein).

## 2020-11-19 NOTE — PROGRESS NOTE ADULT - SUBJECTIVE AND OBJECTIVE BOX
SUBJECTIVE AND OBJECTIVE:  pt awake, alert x3 today.  Complains of hiccups.  Denies pain.  Sister on phone when visiting pt  INTERVAL HPI/OVERNIGHT EVENTS:    DNR on chart:   Allergies    No Known Allergies    Intolerances    MEDICATIONS  (STANDING):  chlorhexidine 4% Liquid 1 Application(s) Topical <User Schedule>  epoetin katlyn-epbx (RETACRIT) Injectable 44921 Unit(s) IV Push <User Schedule>  heparin   Injectable 5000 Unit(s) SubCutaneous every 8 hours  piperacillin/tazobactam IVPB.. 3.375 Gram(s) IV Intermittent every 12 hours  polyethylene glycol 3350 17 Gram(s) Oral daily  senna 2 Tablet(s) Oral at bedtime  tiotropium 18 MICROgram(s) Capsule 1 Capsule(s) Inhalation daily    MEDICATIONS  (PRN):  ALBUTerol    90 MICROgram(s) HFA Inhaler 2 Puff(s) Inhalation every 6 hours PRN Shortness of Breath and/or Wheezing  aluminum hydroxide/magnesium hydroxide/simethicone Suspension 30 milliLiter(s) Oral every 4 hours PRN Dyspepsia  oxyCODONE    IR 5 milliGRAM(s) Oral every 4 hours PRN Moderate to severe pain      ITEMS UNCHECKED ARE NOT PRESENT    PRESENT SYMPTOMS: [ ]Unable to obtain due to poor mentation   Source if other than patient:  [ ]Family   [ ]Team     Pain (Impact on QOL):  denies  Location:  Minimal acceptable level (0-10 scale):            Aggravating factors:  Quality:  Radiation:  Severity (0-10 scale):    Timing:    Dyspnea:                           [ ]Mild [ ]Moderate [ ]Severe  Anxiety:                             [ ]Mild [ ]Moderate [ ]Severe  Fatigue:                             [ ]Mild [x ]Moderate [ ]Severe  Nausea:                             [ ]Mild [ ]Moderate [ ]Severe  Loss of appetite:              [ ]Mild [x ]Moderate [ ]Severe  Constipation:                    [ ]Mild [ ]Moderate [ ]Severe    PAIN AD Score:	  http://geriatrictoolkit.[place].[facility]/cog/painad.pdf (Ctrl + left click to view)    Other Symptoms:  [x ]All other review of systems negative     Karnofsky Performance Score/Palliative Performance Status Version 2:  70       %    http://palliative.info/resource_material/PPSv2.pdf  PHYSICAL EXAM:  Vital Signs Last 24 Hrs  T(C): 36.4 (19 Nov 2020 15:06), Max: 37 (18 Nov 2020 20:50)  T(F): 97.5 (19 Nov 2020 15:06), Max: 98.6 (18 Nov 2020 20:50)  HR: 93 (19 Nov 2020 15:06) (82 - 98)  BP: 113/38 (19 Nov 2020 15:06) (93/51 - 126/54)  BP(mean): --  RR: 18 (19 Nov 2020 15:06) (18 - 18)  SpO2: 94% (19 Nov 2020 15:06) (94% - 100%) I&O's Summary    18 Nov 2020 07:01  -  19 Nov 2020 07:00  --------------------------------------------------------  IN: 400 mL / OUT: 1900 mL / NET: -1500 mL     GENERAL:  [x ]Alert  [ x]Oriented x  3 [ ]Lethargic  [ ]Cachexia  [ ]Unarousable  [ ]Verbal  [ ]Non-Verbal    Behavioral:   [ ] Anxiety  [ ] Delirium [ ] Agitation [ ] Other    HEENT:  [x ]Normal   [ ]Dry mouth   [ ]ET Tube/Trach  [ ]Oral lesions    PULMONARY:   [x ]Clear [ ]Tachypnea  [ ]Audible excessive secretions   [ ]Rhonchi        [ ]Right [ ]Left [ ]Bilateral  [ ]Crackles        [ ]Right [ ]Left [ ]Bilateral  [ ]Wheezing     [ ]Right [ ]Left [ ]Bilateral    CARDIOVASCULAR:    [ ]Regular [ ]Irregular [x ]Tachy  [ ]Froy [ ]Murmur [ ]Other    GASTROINTESTINAL:  [x ]Soft  [x ]Distended   [x ]+BS  [x ]Non tender [ ]Tender  [ ]PEG [ ]OGT/ NGT   Last BM:    GENITOURINARY:  [ ]Normal [ ] Incontinent   [ x]Oliguria/Anuria   [ ]Marc    MUSCULOSKELETAL:   [ ]Normal   [x ]Weakness  [ ]Bed/Wheelchair bound [ ]Edema    NEUROLOGIC:   [x ]No focal deficits  [ ] Cognitive impairment  [ ] Dysphagia [ ]Dysarthria [ ] Paresis [ ]Other     SKIN:   [ x]Normal   [ ]Pressure ulcer(s)  [ ]Rash    CRITICAL CARE:  [ ] Shock Present  [ ]Septic [ ]Cardiogenic [ ]Neurologic [ ]Hypovolemic  [ ]  Vasopressors [ ]  Inotropes   [ ] Respiratory failure present  [ ] Acute  [ ] Chronic [ ] Hypoxic  [ ] Hypercarbic [ ] Other  [ ] Other organ failure     LABS:                        7.1    4.97  )-----------( 137      ( 19 Nov 2020 05:40 )             24.0   11-19    138  |  95<L>  |  44<H>  ----------------------------<  90  4.3   |  29  |  6.55<H>    Ca    8.8      19 Nov 2020 05:40  Phos  4.5     11-19  Mg     2.2     11-19    TPro  8.1  /  Alb  3.6  /  TBili  0.5  /  DBili  x   /  AST  28  /  ALT  14  /  AlkPhos  91  11-17  PT/INR - ( 17 Nov 2020 18:40 )   PT: 14.2 SEC;   INR: 1.26          PTT - ( 17 Nov 2020 18:40 )  PTT:28.4 SEC      RADIOLOGY & ADDITIONAL STUDIES:    Protein Calorie Malnutrition Present: [ ] yes [ ] no  [ ] PPSV2 < or = 30%  [ ] significant weight loss [ ] poor nutritional intake [ ] anasarca [ ] catabolic state Albumin, Serum: 3.6 g/dL (11-17-20 @ 18:40)  Artificial Nutrition [ ]     REFERRALS:   [ ]Chaplaincy  [ ] Hospice  [ ]Child Life  [ ]Social Work  [ ]Case management [ ]Holistic Therapy   Goals of Care Document:

## 2020-11-19 NOTE — PROGRESS NOTE ADULT - PROBLEM SELECTOR PLAN 8
DVT PPx: HSQ  Diet- renal replacement  Dispo: PT recommending home with home PT. D/w patient's sister Paolo, patient not compliant with medical follow up and does not seem able to care for himself at home.

## 2020-11-19 NOTE — PROGRESS NOTE ADULT - ASSESSMENT
75M PMHx ESRD on HD (TTS), emphysema, renal cancer (diagnosed 3 months ago) on sunitinib, HTN, HLD presented to ED with sepsis and AMS.  Renal consulted for ESRD Mx.     ESRD (end stage renal disease) on dialysis.   Maintenance HD schedule TTS, currently off schedule, and dialyzed yesterday without acute events.  Electrolytes and volume status acceptable. Repeat HD tomorrow.  Repeat HD tomorrow. On broad spectrum abx for unclear source of possible infection.   renal restrictions in diet  dose all meds for ESRD  Discussed with pt regarding pts wishes with HD and GOC, should his condition further deteriorates from metastatic cancer.   Explained to pt, that it would be appropriate to stop HD, should he becomes too weak to tolerate it. Pt understands his health is declining, but has not made any decisions regarding GOC yet.   Pall care consult appreciated.     HTN, bp well controlled. stable. not on meds  Anemia in CKD- Hg <goal  AMS -resolved. pt at baseline now  Can plan for prbc transfusion with HD tomorrow, as hg near 7.

## 2020-11-19 NOTE — PROGRESS NOTE ADULT - PROBLEM SELECTOR PLAN 5
overall prognosis is poor  started discussing about AD  we did discuss hospice services however unsure if eligible due to HD   will reach out to [facility]CN and discuss case  family aware of above

## 2020-11-19 NOTE — DIETITIAN INITIAL EVALUATION ADULT. - ORAL INTAKE PTA/DIET HISTORY
Patient noted to be poor historian. Patient lives alone, reports getting foods delivered (could not name service). Diet recall as follows: harris and eggs for breakfast and ham and cheese sandwich for lunch. Reports drinking Enusre supplement 1x daily at home and receives Nepro at dialysis. Endorses poor appetite prior to admission.

## 2020-11-19 NOTE — PROGRESS NOTE ADULT - PROBLEM SELECTOR PLAN 2
Given time course with initiation of Sunitinib, may be 2/2 medication (25-39% incidence per UTD). Possibly from IVC compression with necrotic lymph node or tumor invasion. Lactate improved from 8 to 1.7. No peritoneal signs on exam.  - Oxycodone 5mg PO q4h PRN for pain  - Palliative care consulted  - Bowel regimen  - US b/l LE venous dopplers negative for DVT

## 2020-11-19 NOTE — DIETITIAN INITIAL EVALUATION ADULT. - PERTINENT LABORATORY DATA
11-19 Na 138 mmol/L Glu 90 mg/dL K+ 4.3 mmol/L Cr 6.55 mg/dL<H> BUN 44 mg/dL<H> Phos 4.5 mg/dL  Hgb 7.1 g/dL<L> Hct 24.0 %<L> Glucose, Serum: 90 mg/dL  Glucose, Serum: 127 mg/dL <H>

## 2020-11-20 LAB
ANION GAP SERPL CALC-SCNC: 14 MMO/L — SIGNIFICANT CHANGE UP (ref 7–14)
BLD GP AB SCN SERPL QL: NEGATIVE — SIGNIFICANT CHANGE UP
BUN SERPL-MCNC: 64 MG/DL — HIGH (ref 7–23)
CALCIUM SERPL-MCNC: 9.2 MG/DL — SIGNIFICANT CHANGE UP (ref 8.4–10.5)
CHLORIDE SERPL-SCNC: 91 MMOL/L — LOW (ref 98–107)
CO2 SERPL-SCNC: 28 MMOL/L — SIGNIFICANT CHANGE UP (ref 22–31)
CREAT SERPL-MCNC: 7.71 MG/DL — HIGH (ref 0.5–1.3)
CULTURE RESULTS: SIGNIFICANT CHANGE UP
GLUCOSE BLDC GLUCOMTR-MCNC: 211 MG/DL — HIGH (ref 70–99)
GLUCOSE SERPL-MCNC: 98 MG/DL — SIGNIFICANT CHANGE UP (ref 70–99)
HCT VFR BLD CALC: 24.1 % — LOW (ref 39–50)
HGB BLD-MCNC: 7.3 G/DL — LOW (ref 13–17)
MAGNESIUM SERPL-MCNC: 2.5 MG/DL — SIGNIFICANT CHANGE UP (ref 1.6–2.6)
MCHC RBC-ENTMCNC: 24 PG — LOW (ref 27–34)
MCHC RBC-ENTMCNC: 30.3 % — LOW (ref 32–36)
MCV RBC AUTO: 79.3 FL — LOW (ref 80–100)
NRBC # FLD: 0 K/UL — SIGNIFICANT CHANGE UP (ref 0–0)
PHOSPHATE SERPL-MCNC: 4.1 MG/DL — SIGNIFICANT CHANGE UP (ref 2.5–4.5)
PLATELET # BLD AUTO: 183 K/UL — SIGNIFICANT CHANGE UP (ref 150–400)
PMV BLD: 10.5 FL — SIGNIFICANT CHANGE UP (ref 7–13)
POTASSIUM SERPL-MCNC: 4.3 MMOL/L — SIGNIFICANT CHANGE UP (ref 3.5–5.3)
POTASSIUM SERPL-SCNC: 4.3 MMOL/L — SIGNIFICANT CHANGE UP (ref 3.5–5.3)
RBC # BLD: 3.04 M/UL — LOW (ref 4.2–5.8)
RBC # FLD: 23.8 % — HIGH (ref 10.3–14.5)
RH IG SCN BLD-IMP: POSITIVE — SIGNIFICANT CHANGE UP
SODIUM SERPL-SCNC: 133 MMOL/L — LOW (ref 135–145)
SPECIMEN SOURCE: SIGNIFICANT CHANGE UP
WBC # BLD: 4.8 K/UL — SIGNIFICANT CHANGE UP (ref 3.8–10.5)
WBC # FLD AUTO: 4.8 K/UL — SIGNIFICANT CHANGE UP (ref 3.8–10.5)

## 2020-11-20 PROCEDURE — 99233 SBSQ HOSP IP/OBS HIGH 50: CPT

## 2020-11-20 RX ADMIN — CHLORHEXIDINE GLUCONATE 1 APPLICATION(S): 213 SOLUTION TOPICAL at 10:59

## 2020-11-20 RX ADMIN — HEPARIN SODIUM 5000 UNIT(S): 5000 INJECTION INTRAVENOUS; SUBCUTANEOUS at 05:38

## 2020-11-20 RX ADMIN — PIPERACILLIN AND TAZOBACTAM 25 GRAM(S): 4; .5 INJECTION, POWDER, LYOPHILIZED, FOR SOLUTION INTRAVENOUS at 05:38

## 2020-11-20 RX ADMIN — ERYTHROPOIETIN 10000 UNIT(S): 10000 INJECTION, SOLUTION INTRAVENOUS; SUBCUTANEOUS at 20:07

## 2020-11-20 RX ADMIN — TIOTROPIUM BROMIDE 1 CAPSULE(S): 18 CAPSULE ORAL; RESPIRATORY (INHALATION) at 09:46

## 2020-11-20 RX ADMIN — HEPARIN SODIUM 5000 UNIT(S): 5000 INJECTION INTRAVENOUS; SUBCUTANEOUS at 21:59

## 2020-11-20 RX ADMIN — SENNA PLUS 2 TABLET(S): 8.6 TABLET ORAL at 21:59

## 2020-11-20 RX ADMIN — PIPERACILLIN AND TAZOBACTAM 25 GRAM(S): 4; .5 INJECTION, POWDER, LYOPHILIZED, FOR SOLUTION INTRAVENOUS at 21:58

## 2020-11-20 NOTE — CHART NOTE - NSCHARTNOTEFT_GEN_A_CORE
Appreciate Renal input.  Pt aware of overall medical condition.  Not interested in chemo but wishes to continue HD at this time. Hospice inquiry made- not a candidate due to HD.  Will follow up with CM and SW next week.  May require snf if safety an issues at home.  Brigitte Fermin DO

## 2020-11-20 NOTE — PROGRESS NOTE ADULT - ASSESSMENT
75M PMHx ESRD on HD (TTS), emphysema, renal cancer (diagnosed 3 months ago) on sunitinib, HTN, HLD presented to ED with sepsis and AMS.  Renal consulted for ESRD Mx.     ESRD (end stage renal disease) on dialysis.   Maintenance HD schedule TTS, currently off schedule.  Electrolytes and volume status acceptable. Repeat HD today.  On broad spectrum abx for unclear source of possible infection.   renal restrictions in diet  dose all meds for ESRD  Discussed with pt regarding pts wishes with HD and GOC, should his condition further deteriorates from metastatic cancer.   Explained to pt, that it would be appropriate to stop HD, should he becomes too weak to tolerate it. Pt understands his health is declining, however he says he is not ready to stop HD at this time.   Pall care consult appreciated.     HTN, bp well controlled. stable. not on meds  Anemia in CKD- Hg <goal  AMS -resolved. pt at baseline now  Can plan for prbc transfusion with HD today, as hg near 7.

## 2020-11-20 NOTE — PROVIDER CONTACT NOTE (OTHER) - BACKGROUND
75 year old male with pmhx of htn, renal cell carcinoma of right  kindney, weakness, encephalopathy, abdominal pain, and SIRS. present to ED with sepsis and AMS.

## 2020-11-20 NOTE — PROGRESS NOTE ADULT - SUBJECTIVE AND OBJECTIVE BOX
Nephrology Followup Note - [phone] - Dr See / Dr Marc / Dr Snow / Dr Ellison / Dr Buchanan / Dr Headley / Dr Ortiz / Dr Jamison  Pt seen and examined at bedside  No acute events overnight. No complaints. Feeling stronger today     Allergies:  No Known Allergies    Hospital Medications:   MEDICATIONS  (STANDING):  chlorhexidine 4% Liquid 1 Application(s) Topical <User Schedule>  epoetin katlyn-epbx (RETACRIT) Injectable 77096 Unit(s) IV Push <User Schedule>  heparin   Injectable 5000 Unit(s) SubCutaneous every 8 hours  piperacillin/tazobactam IVPB.. 3.375 Gram(s) IV Intermittent every 12 hours  polyethylene glycol 3350 17 Gram(s) Oral daily  senna 2 Tablet(s) Oral at bedtime  tiotropium 18 MICROgram(s) Capsule 1 Capsule(s) Inhalation daily    VITALS:  T(F): 98.3 (11-20-20 @ 09:40), Max: 98.4 (11-20-20 @ 05:37)  HR: 80 (11-20-20 @ 09:40)  BP: 99/40 (11-20-20 @ 09:40)  RR: 20 (11-20-20 @ 09:40)  SpO2: 96% (11-20-20 @ 09:40)  Wt(kg): --      PHYSICAL EXAM:  Constitutional: NAD  HEENT: anicteric sclera, oropharynx clear, MMM  Neck: No JVD  Respiratory: CTAB, no wheezes, rales or rhonchi  Cardiovascular: S1, S2, RRR  Gastrointestinal: BS+, soft, NT/ND  Extremities: No cyanosis or clubbing. No peripheral edema  Neurological: A/O x 3, no focal deficits  Psychiatric: Normal mood, normal affect  : No CVA tenderness. No wheatley.   Skin: No rashes  Vascular Access: UE AV access +thrill and bruit.     LABS:  11-20    133<L>  |  91<L>  |  64<H>  ----------------------------<  98  4.3   |  28  |  7.71<H>    Ca    9.2      20 Nov 2020 02:50  Phos  4.1     11-20  Mg     2.5     11-20      Creatinine Trend: 7.71 <--, 6.55 <--, 10.30 <--, 9.86 <--, 9.92 <--                        7.3    4.80  )-----------( 183      ( 20 Nov 2020 02:50 )             24.1     Urine Studies:      RADIOLOGY & ADDITIONAL STUDIES:

## 2020-11-20 NOTE — PROGRESS NOTE ADULT - PROBLEM SELECTOR PLAN 8
DVT PPx: HSQ  Diet- renal replacement  Dispo: PT recommending home with home PT. D/w patient's sister Paolo, patient not compliant with medical follow up and does not seem able to care for himself at home. Palliative care team and CM following for dispo planning

## 2020-11-20 NOTE — PROGRESS NOTE ADULT - ATTENDING COMMENTS
Yosi See MD  [facility]  Office [phone]  Ans Serv [phone] cell [phone]

## 2020-11-20 NOTE — PROGRESS NOTE ADULT - SUBJECTIVE AND OBJECTIVE BOX
Latha Hartman  [facility] Medicine  Pager #[phone]    Patient is a 75y old  Male who presents with a chief complaint of AMS (19 Nov 2020 15:19)      SUBJECTIVE / OVERNIGHT EVENTS: Patient seen and examined at bedside. Patient feeling well, is having mild abdominal pain which he states is "manageable." Does not want to take any medications for pain currently.    ADDITIONAL REVIEW OF SYSTEMS:    MEDICATIONS  (STANDING):  chlorhexidine 4% Liquid 1 Application(s) Topical <User Schedule>  epoetin katlyn-epbx (RETACRIT) Injectable 40155 Unit(s) IV Push <User Schedule>  heparin   Injectable 5000 Unit(s) SubCutaneous every 8 hours  piperacillin/tazobactam IVPB.. 3.375 Gram(s) IV Intermittent every 12 hours  polyethylene glycol 3350 17 Gram(s) Oral daily  senna 2 Tablet(s) Oral at bedtime  tiotropium 18 MICROgram(s) Capsule 1 Capsule(s) Inhalation daily    MEDICATIONS  (PRN):  ALBUTerol    90 MICROgram(s) HFA Inhaler 2 Puff(s) Inhalation every 6 hours PRN Shortness of Breath and/or Wheezing  aluminum hydroxide/magnesium hydroxide/simethicone Suspension 30 milliLiter(s) Oral every 4 hours PRN Dyspepsia  oxyCODONE    IR 5 milliGRAM(s) Oral every 4 hours PRN Moderate to severe pain      CAPILLARY BLOOD GLUCOSE        I&O's Summary      PHYSICAL EXAM:    Vital Signs Last 24 Hrs  T(C): 36.8 (20 Nov 2020 09:40), Max: 36.9 (20 Nov 2020 05:37)  T(F): 98.3 (20 Nov 2020 09:40), Max: 98.4 (20 Nov 2020 05:37)  HR: 80 (20 Nov 2020 09:40) (80 - 93)  BP: 99/40 (20 Nov 2020 09:40) (93/51 - 141/57)  BP(mean): --  RR: 20 (20 Nov 2020 09:40) (18 - 20)  SpO2: 96% (20 Nov 2020 09:40) (94% - 100%)    CONSTITUTIONAL: NAD, well-developed, thin  EYES: Conjunctiva and sclera clear  RESPIRATORY: Normal respiratory effort; lungs are clear to auscultation bilaterally  CARDIOVASCULAR: Regular rate and rhythm, normal S1 and S2, no murmur/rub/gallop; No lower extremity edema  ABDOMEN: Nontender to palpation, normoactive bowel sounds, no rebound/guarding  MUSCULOSKELETAL: No clubbing or cyanosis of digits  PSYCH: AAOx3  NEUROLOGY: Fine tremor in b/l upper extremities, hyperspasticity of LUE  SKIN: LUE AVF    LABS:                        7.3    4.80  )-----------( 183      ( 20 Nov 2020 02:50 )             24.1     11-20    133<L>  |  91<L>  |  64<H>  ----------------------------<  98  4.3   |  28  |  7.71<H>    Ca    9.2      20 Nov 2020 02:50  Phos  4.1     11-20  Mg     2.5     11-20    Culture - Nose (collected 19 Nov 2020 02:11)  Source: .Nose Nose  Preliminary Report (19 Nov 2020 20:59):    Culture in progress    Culture - Blood (collected 17 Nov 2020 23:23)  Source: .Blood Blood-Venous  Preliminary Report (19 Nov 2020 01:02):    No growth to date.    Culture - Blood (collected 17 Nov 2020 23:23)  Source: .Blood Blood-Peripheral  Preliminary Report (19 Nov 2020 01:02):    No growth to date.      RADIOLOGY & ADDITIONAL TESTS: No new imaging  Results Reviewed: Yes  Imaging Personally Reviewed:  Electrocardiogram Personally Reviewed:    COORDINATION OF CARE:  Care Discussed with Consultants/Other Providers [Y/N]:  Prior or Outpatient Records Reviewed [Y/N]:

## 2020-11-20 NOTE — PROGRESS NOTE ADULT - PROBLEM SELECTOR PLAN 1
T 101.5, , Lactate 8 on admission. Possible source is intraabdominal given severe pain. Possible necrotic lymph node seen on CT  - C/w Zosyn- will plan for 5 day course given no source of infection identified  - F/u blood cultures- NGTD

## 2020-11-20 NOTE — PROGRESS NOTE ADULT - PROBLEM SELECTOR PLAN 7
Hgb 8.4-> 7.2. Likely anemia of chronic disease given renal failure and malignancy vs blood loss. Noted recent bleeding from penis, s/p cystoscopy with no source. No active bleeding noted  - Will transfuse 1u PRBCs with HD today  - Maintain active type and screen  - Monitor H/H and transfuse to keep Hgb > 7  - Iron studies suggestive of anemia of chronic disease  - Folate and Vitamin B12 levels WNL

## 2020-11-20 NOTE — GOALS OF CARE CONVERSATION - ADVANCED CARE PLANNING - CONVERSATION DETAILS
HOSPICE CARE NETWORK    [facility] RN collaborated with [facility] for eligibility for home hospice care for pt. [facility] MD found pt ineligible - if hemodialysis is to be continued. If hemodialysis is discontinued, [facility] RN will present case again for approval.

## 2020-11-21 LAB
ANION GAP SERPL CALC-SCNC: 14 MMO/L — SIGNIFICANT CHANGE UP (ref 7–14)
BUN SERPL-MCNC: 39 MG/DL — HIGH (ref 7–23)
CALCIUM SERPL-MCNC: 9.3 MG/DL — SIGNIFICANT CHANGE UP (ref 8.4–10.5)
CHLORIDE SERPL-SCNC: 92 MMOL/L — LOW (ref 98–107)
CO2 SERPL-SCNC: 30 MMOL/L — SIGNIFICANT CHANGE UP (ref 22–31)
CREAT SERPL-MCNC: 4.98 MG/DL — HIGH (ref 0.5–1.3)
GLUCOSE SERPL-MCNC: 85 MG/DL — SIGNIFICANT CHANGE UP (ref 70–99)
HCT VFR BLD CALC: 26.5 % — LOW (ref 39–50)
HGB BLD-MCNC: 7.9 G/DL — LOW (ref 13–17)
MAGNESIUM SERPL-MCNC: 2.3 MG/DL — SIGNIFICANT CHANGE UP (ref 1.6–2.6)
MCHC RBC-ENTMCNC: 24.4 PG — LOW (ref 27–34)
MCHC RBC-ENTMCNC: 29.8 % — LOW (ref 32–36)
MCV RBC AUTO: 81.8 FL — SIGNIFICANT CHANGE UP (ref 80–100)
NRBC # FLD: 0 K/UL — SIGNIFICANT CHANGE UP (ref 0–0)
PHOSPHATE SERPL-MCNC: 3.2 MG/DL — SIGNIFICANT CHANGE UP (ref 2.5–4.5)
PLATELET # BLD AUTO: 170 K/UL — SIGNIFICANT CHANGE UP (ref 150–400)
PMV BLD: 10 FL — SIGNIFICANT CHANGE UP (ref 7–13)
POTASSIUM SERPL-MCNC: 4.2 MMOL/L — SIGNIFICANT CHANGE UP (ref 3.5–5.3)
POTASSIUM SERPL-SCNC: 4.2 MMOL/L — SIGNIFICANT CHANGE UP (ref 3.5–5.3)
RBC # BLD: 3.24 M/UL — LOW (ref 4.2–5.8)
RBC # FLD: 22.5 % — HIGH (ref 10.3–14.5)
SODIUM SERPL-SCNC: 136 MMOL/L — SIGNIFICANT CHANGE UP (ref 135–145)
WBC # BLD: 4.73 K/UL — SIGNIFICANT CHANGE UP (ref 3.8–10.5)
WBC # FLD AUTO: 4.73 K/UL — SIGNIFICANT CHANGE UP (ref 3.8–10.5)

## 2020-11-21 PROCEDURE — 99233 SBSQ HOSP IP/OBS HIGH 50: CPT

## 2020-11-21 RX ORDER — BACLOFEN 100 %
5 POWDER (GRAM) MISCELLANEOUS ONCE
Refills: 0 | Status: COMPLETED | OUTPATIENT
Start: 2020-11-21 | End: 2020-11-21

## 2020-11-21 RX ADMIN — OXYCODONE HYDROCHLORIDE 5 MILLIGRAM(S): 5 TABLET ORAL at 21:50

## 2020-11-21 RX ADMIN — HEPARIN SODIUM 5000 UNIT(S): 5000 INJECTION INTRAVENOUS; SUBCUTANEOUS at 14:09

## 2020-11-21 RX ADMIN — POLYETHYLENE GLYCOL 3350 17 GRAM(S): 17 POWDER, FOR SOLUTION ORAL at 12:29

## 2020-11-21 RX ADMIN — PIPERACILLIN AND TAZOBACTAM 25 GRAM(S): 4; .5 INJECTION, POWDER, LYOPHILIZED, FOR SOLUTION INTRAVENOUS at 17:25

## 2020-11-21 RX ADMIN — PIPERACILLIN AND TAZOBACTAM 25 GRAM(S): 4; .5 INJECTION, POWDER, LYOPHILIZED, FOR SOLUTION INTRAVENOUS at 06:18

## 2020-11-21 RX ADMIN — TIOTROPIUM BROMIDE 1 CAPSULE(S): 18 CAPSULE ORAL; RESPIRATORY (INHALATION) at 10:42

## 2020-11-21 RX ADMIN — SENNA PLUS 2 TABLET(S): 8.6 TABLET ORAL at 21:50

## 2020-11-21 RX ADMIN — Medication 5 MILLIGRAM(S): at 06:18

## 2020-11-21 RX ADMIN — HEPARIN SODIUM 5000 UNIT(S): 5000 INJECTION INTRAVENOUS; SUBCUTANEOUS at 06:18

## 2020-11-21 RX ADMIN — OXYCODONE HYDROCHLORIDE 5 MILLIGRAM(S): 5 TABLET ORAL at 16:39

## 2020-11-21 RX ADMIN — OXYCODONE HYDROCHLORIDE 5 MILLIGRAM(S): 5 TABLET ORAL at 16:24

## 2020-11-21 RX ADMIN — OXYCODONE HYDROCHLORIDE 5 MILLIGRAM(S): 5 TABLET ORAL at 12:24

## 2020-11-21 RX ADMIN — CHLORHEXIDINE GLUCONATE 1 APPLICATION(S): 213 SOLUTION TOPICAL at 10:42

## 2020-11-21 RX ADMIN — HEPARIN SODIUM 5000 UNIT(S): 5000 INJECTION INTRAVENOUS; SUBCUTANEOUS at 21:50

## 2020-11-21 RX ADMIN — OXYCODONE HYDROCHLORIDE 5 MILLIGRAM(S): 5 TABLET ORAL at 10:42

## 2020-11-21 NOTE — PROGRESS NOTE ADULT - SUBJECTIVE AND OBJECTIVE BOX
[facility] : Ans Serv [phone], Office [phone]  Dr Ellison/Dr Marc / Dr Mayi SINHA /Dr Chetan yi /Dr ERNESTINE Buchanan/Dr Trevon Snow/Dr Allan Ortiz /Dr KIERA Jamison  _______________________________________________________________________________________________    seen and examined today for End Stage Renal Disease on Dialysis   Interval : s/p hd yesterday  VITALS:  T(F): 97.3 (11-21-20 @ 06:17), Max: 99 (11-20-20 @ 21:15)  HR: 81 (11-21-20 @ 06:17)  BP: 115/65 (11-21-20 @ 06:17)  RR: 19 (11-21-20 @ 06:17)  SpO2: 100% (11-21-20 @ 06:17)    11-20 @ 07:01  -  11-21 @ 07:00  --------------------------------------------------------  IN: 700 mL / OUT: 2200 mL / NET: -1500 mL    Physical Exam :-  Constitutional: NAD  Neck: Supple.  Respiratory: Bilateral equal breath sounds, no Crackles present.  Cardiovascular: S1, S2 normal, positive Murmur  Gastrointestinal: Bowel Sounds present, soft, non tender.  Extremities: no Edema Feet  Neurological: Alert and Oriented x 3  Psychiatric: Normal mood, normal affect  Data:-  Allergies :   No Known Allergies    Hospital Medications:   MEDICATIONS  (STANDING):  chlorhexidine 4% Liquid 1 Application(s) Topical <User Schedule>  epoetin katlyn-epbx (RETACRIT) Injectable 72824 Unit(s) IV Push <User Schedule>  heparin   Injectable 5000 Unit(s) SubCutaneous every 8 hours  piperacillin/tazobactam IVPB.. 3.375 Gram(s) IV Intermittent every 12 hours  polyethylene glycol 3350 17 Gram(s) Oral daily  senna 2 Tablet(s) Oral at bedtime  tiotropium 18 MICROgram(s) Capsule 1 Capsule(s) Inhalation daily    11-21    136  |  92<L>  |  39<H>  ----------------------------<  85  4.2   |  30  |  4.98<H>    Ca    9.3      21 Nov 2020 05:45  Phos  3.2     11-21  Mg     2.3     11-21      Creatinine Trend: 4.98 <--, 7.71 <--, 6.55 <--, 10.30 <--, 9.86 <--, 9.92 <--                        7.9    4.73  )-----------( 170      ( 21 Nov 2020 05:45 )             26.5

## 2020-11-21 NOTE — PROGRESS NOTE ADULT - PROBLEM SELECTOR PLAN 3
Likely metabolic in the setting of infection. Now resolving, appears to be at baseline  - Antibiotics as above  - Noted to have tremor of b/l upper extremities, ammonia level normal  - awaiting MRI of Brain

## 2020-11-21 NOTE — PROGRESS NOTE ADULT - PROBLEM SELECTOR PLAN 1
T 101.5, , Lactate 8 on admission. Possible source is intraabdominal given severe pain. Possible necrotic lymph node seen on CT  - C/w Zosyn- will plan for 5 day course given no source of infection identified  - F/u blood cultures- NGTD  - improved.

## 2020-11-21 NOTE — PROGRESS NOTE ADULT - ASSESSMENT
75M PMHx ESRD on HD (TTS), emphysema, renal cancer (diagnosed 3 months ago) on sunitinib, HTN, HLD presented to ED with sepsis and AMS.  Renal consulted for ESRD Mx.     ESRD (end stage renal disease) on dialysis.   Maintenance HD schedule TTS, currently off schedule.  Electrolytes and volume status acceptable.s/p hemodialysis yesterday.   renal restrictions in diet  dose all meds for ESRD  Discussed with pt regarding pts wishes with HD and GOC, should his condition further deteriorates from metastatic cancer.   Explained to pt, that it would be appropriate to stop HD, should he becomes too weak to tolerate it. Pt understands his health is declining, however he says he is not ready to stop HD at this time.   Pall care consult appreciated.     HTN, bp well controlled. stable. not on meds  Anemia in CKD- Hg <goal  AMS -resolved. pt at baseline now

## 2020-11-21 NOTE — PROGRESS NOTE ADULT - SUBJECTIVE AND OBJECTIVE BOX
[facility] Division of Hospital Medicine  Pablo Alves MD  Pager (M-F, 8A-5P): [phone]  Other Times:  [phone]    Patient is a 75y old  Male who presents with a chief complaint of AMS (21 Nov 2020 11:29)    SUBJECTIVE / OVERNIGHT EVENTS: Offers no new complaints.  Did not get his MRI yet.  No F/C, N/V, CP, SOB, Cough, lightheadedness, dizziness, abdominal pain, diarrhea, dysuria.      MEDICATIONS  (STANDING):  chlorhexidine 4% Liquid 1 Application(s) Topical <User Schedule>  epoetin katlyn-epbx (RETACRIT) Injectable 74298 Unit(s) IV Push <User Schedule>  heparin   Injectable 5000 Unit(s) SubCutaneous every 8 hours  piperacillin/tazobactam IVPB.. 3.375 Gram(s) IV Intermittent every 12 hours  polyethylene glycol 3350 17 Gram(s) Oral daily  senna 2 Tablet(s) Oral at bedtime  tiotropium 18 MICROgram(s) Capsule 1 Capsule(s) Inhalation daily    MEDICATIONS  (PRN):  ALBUTerol    90 MICROgram(s) HFA Inhaler 2 Puff(s) Inhalation every 6 hours PRN Shortness of Breath and/or Wheezing  aluminum hydroxide/magnesium hydroxide/simethicone Suspension 30 milliLiter(s) Oral every 4 hours PRN Dyspepsia  oxyCODONE    IR 5 milliGRAM(s) Oral every 4 hours PRN Moderate to severe pain      Vital Signs Last 24 Hrs  T(C): 36.3 (21 Nov 2020 06:17), Max: 37.2 (20 Nov 2020 21:15)  T(F): 97.3 (21 Nov 2020 06:17), Max: 99 (20 Nov 2020 21:15)  HR: 81 (21 Nov 2020 06:17) (81 - 96)  BP: 115/65 (21 Nov 2020 06:17) (115/65 - 136/50)  BP(mean): --  RR: 19 (21 Nov 2020 06:17) (17 - 19)  SpO2: 100% (21 Nov 2020 06:17) (95% - 100%)  CAPILLARY BLOOD GLUCOSE      POCT Blood Glucose.: 211 mg/dL (20 Nov 2020 22:17)    I&O's Summary    20 Nov 2020 07:01  -  21 Nov 2020 07:00  --------------------------------------------------------  IN: 700 mL / OUT: 2200 mL / NET: -1500 mL        PHYSICAL EXAM:  CONSTITUTIONAL: NAD, well-developed, thin  EYES: Conjunctiva and sclera clear  RESPIRATORY: Normal respiratory effort; lungs are clear to auscultation bilaterally  CARDIOVASCULAR: Regular rate and rhythm, normal S1 and S2, no murmur/rub/gallop; No lower extremity edema  ABDOMEN: Nontender to palpation, normoactive bowel sounds, no rebound/guarding  MUSCULOSKELETAL: No clubbing or cyanosis of digits  PSYCH: AAOx3  NEUROLOGY: Fine tremor in b/l upper extremities, hyperspasticity of LUE  SKIN: LUE AVF    LABS:                        7.9    4.73  )-----------( 170      ( 21 Nov 2020 05:45 )             26.5     11-21    136  |  92<L>  |  39<H>  ----------------------------<  85  4.2   |  30  |  4.98<H>    Ca    9.3      21 Nov 2020 05:45  Phos  3.2     11-21  Mg     2.3     11-21                RADIOLOGY & ADDITIONAL TESTS:  MRI Brain pending.    Imaging Personally Reviewed:    Care Discussed with Consultants/Other Providers:

## 2020-11-22 LAB
ANION GAP SERPL CALC-SCNC: 14 MMO/L — SIGNIFICANT CHANGE UP (ref 7–14)
BUN SERPL-MCNC: 54 MG/DL — HIGH (ref 7–23)
CALCIUM SERPL-MCNC: 9.4 MG/DL — SIGNIFICANT CHANGE UP (ref 8.4–10.5)
CHLORIDE SERPL-SCNC: 92 MMOL/L — LOW (ref 98–107)
CO2 SERPL-SCNC: 28 MMOL/L — SIGNIFICANT CHANGE UP (ref 22–31)
CREAT SERPL-MCNC: 6.5 MG/DL — HIGH (ref 0.5–1.3)
GLUCOSE SERPL-MCNC: 86 MG/DL — SIGNIFICANT CHANGE UP (ref 70–99)
HCT VFR BLD CALC: 27.3 % — LOW (ref 39–50)
HGB BLD-MCNC: 8.2 G/DL — LOW (ref 13–17)
MAGNESIUM SERPL-MCNC: 2.5 MG/DL — SIGNIFICANT CHANGE UP (ref 1.6–2.6)
MCHC RBC-ENTMCNC: 24.8 PG — LOW (ref 27–34)
MCHC RBC-ENTMCNC: 30 % — LOW (ref 32–36)
MCV RBC AUTO: 82.5 FL — SIGNIFICANT CHANGE UP (ref 80–100)
NRBC # FLD: 0 K/UL — SIGNIFICANT CHANGE UP (ref 0–0)
PHOSPHATE SERPL-MCNC: 3.8 MG/DL — SIGNIFICANT CHANGE UP (ref 2.5–4.5)
PLATELET # BLD AUTO: 172 K/UL — SIGNIFICANT CHANGE UP (ref 150–400)
PMV BLD: 10.3 FL — SIGNIFICANT CHANGE UP (ref 7–13)
POTASSIUM SERPL-MCNC: 4.4 MMOL/L — SIGNIFICANT CHANGE UP (ref 3.5–5.3)
POTASSIUM SERPL-SCNC: 4.4 MMOL/L — SIGNIFICANT CHANGE UP (ref 3.5–5.3)
RBC # BLD: 3.31 M/UL — LOW (ref 4.2–5.8)
RBC # FLD: 22.7 % — HIGH (ref 10.3–14.5)
SODIUM SERPL-SCNC: 134 MMOL/L — LOW (ref 135–145)
WBC # BLD: 4.61 K/UL — SIGNIFICANT CHANGE UP (ref 3.8–10.5)
WBC # FLD AUTO: 4.61 K/UL — SIGNIFICANT CHANGE UP (ref 3.8–10.5)

## 2020-11-22 PROCEDURE — 99233 SBSQ HOSP IP/OBS HIGH 50: CPT

## 2020-11-22 RX ORDER — CHLORHEXIDINE GLUCONATE 213 G/1000ML
1 SOLUTION TOPICAL
Refills: 0 | Status: DISCONTINUED | OUTPATIENT
Start: 2020-11-22 | End: 2020-11-23

## 2020-11-22 RX ADMIN — HEPARIN SODIUM 5000 UNIT(S): 5000 INJECTION INTRAVENOUS; SUBCUTANEOUS at 05:49

## 2020-11-22 RX ADMIN — HEPARIN SODIUM 5000 UNIT(S): 5000 INJECTION INTRAVENOUS; SUBCUTANEOUS at 22:54

## 2020-11-22 RX ADMIN — PIPERACILLIN AND TAZOBACTAM 25 GRAM(S): 4; .5 INJECTION, POWDER, LYOPHILIZED, FOR SOLUTION INTRAVENOUS at 05:48

## 2020-11-22 RX ADMIN — OXYCODONE HYDROCHLORIDE 5 MILLIGRAM(S): 5 TABLET ORAL at 01:11

## 2020-11-22 RX ADMIN — TIOTROPIUM BROMIDE 1 CAPSULE(S): 18 CAPSULE ORAL; RESPIRATORY (INHALATION) at 09:44

## 2020-11-22 RX ADMIN — PIPERACILLIN AND TAZOBACTAM 25 GRAM(S): 4; .5 INJECTION, POWDER, LYOPHILIZED, FOR SOLUTION INTRAVENOUS at 17:44

## 2020-11-22 RX ADMIN — ERYTHROPOIETIN 10000 UNIT(S): 10000 INJECTION, SOLUTION INTRAVENOUS; SUBCUTANEOUS at 11:17

## 2020-11-22 RX ADMIN — CHLORHEXIDINE GLUCONATE 1 APPLICATION(S): 213 SOLUTION TOPICAL at 09:45

## 2020-11-22 RX ADMIN — OXYCODONE HYDROCHLORIDE 5 MILLIGRAM(S): 5 TABLET ORAL at 23:45

## 2020-11-22 RX ADMIN — OXYCODONE HYDROCHLORIDE 5 MILLIGRAM(S): 5 TABLET ORAL at 22:54

## 2020-11-22 RX ADMIN — SENNA PLUS 2 TABLET(S): 8.6 TABLET ORAL at 22:54

## 2020-11-22 NOTE — PROGRESS NOTE ADULT - ASSESSMENT
75M PMHx ESRD on HD (TTS), emphysema, renal cancer (diagnosed 3 months ago) on sunitinib, HTN, HLD presented to ED with sepsis from necrotic lymph node and AMS.

## 2020-11-22 NOTE — PROGRESS NOTE ADULT - PROBLEM SELECTOR PLAN 5
On HD TTS  - Nephrology consulted  - HD per Renal  - unable to have disposition to hospice due to patient's wish to continue HD.

## 2020-11-22 NOTE — PROGRESS NOTE ADULT - ASSESSMENT
75M PMHx ESRD on HD (TTS), emphysema, renal cancer (diagnosed 3 months ago) on sunitinib, HTN, HLD presented to ED with sepsis and AMS.  Renal consulted for ESRD Mx.     ESRD (end stage renal disease) on dialysis.   Maintenance HD schedule TTS, currently off schedule.- tolerating hemodialysis well, blood pressure lower begening of hemodialysis but not stable, Ultrafiltration on Dialysis as tolerated  Electrolytes and volume status acceptable.s/p hemodialysis yesterday.   renal restrictions in diet  dose all meds for ESRD  Discussed with pt regarding pts wishes with HD and GOC, should his condition further deteriorates from metastatic cancer.   Explained to pt, that it would be appropriate to stop HD, should he becomes too weak to tolerate it. Pt understands his health is declining, however he says he is not ready to stop HD at this time.     HTN, bp well controlled. stable. not on meds  Anemia in CKD- Hg <goal  AMS -resolved. pt at baseline now

## 2020-11-22 NOTE — PROGRESS NOTE ADULT - SUBJECTIVE AND OBJECTIVE BOX
[facility] : Ans Serv [phone], Office [phone]  Dr Ellison/Dr Marc / Dr Mayi SINHA /Dr Chetan yi /Dr ERNESTINE Buchanan/Dr Trevon Snow/Dr Allan Ortiz /Dr KIERA Jamison  _______________________________________________________________________________________________    seen and examined today for End Stage Renal Disease on Dialysis , seen on hemodialysis   Interval : tolerating hemodialysis well,  VITALS:  T(F): 97.3 (11-22-20 @ 10:15), Max: 98 (11-22-20 @ 05:47)  HR: 85 (11-22-20 @ 10:15)  BP: 93/43 (11-22-20 @ 10:15)  RR: 18 (11-22-20 @ 10:15)  SpO2: 99% (11-22-20 @ 05:47)    Physical Exam :-  Constitutional: NAD  Neck: Supple.  Respiratory: Bilateral equal breath sounds, no Crackles present.  Cardiovascular: S1, S2 normal, positive Murmur  Gastrointestinal: Bowel Sounds present, soft, non tender.  Extremities: no Edema Feet  Neurological: Alert  Psychiatric: Normal mood, normal affect  Data:-  Allergies :   No Known Allergies    Hospital Medications:   MEDICATIONS  (STANDING):  chlorhexidine 4% Liquid 1 Application(s) Topical <User Schedule>  epoetin katlyn-epbx (RETACRIT) Injectable 06352 Unit(s) IV Push <User Schedule>  heparin   Injectable 5000 Unit(s) SubCutaneous every 8 hours  piperacillin/tazobactam IVPB.. 3.375 Gram(s) IV Intermittent every 12 hours  polyethylene glycol 3350 17 Gram(s) Oral daily  senna 2 Tablet(s) Oral at bedtime  tiotropium 18 MICROgram(s) Capsule 1 Capsule(s) Inhalation daily    11-22    134<L>  |  92<L>  |  54<H>  ----------------------------<  86  4.4   |  28  |  6.50<H>    Ca    9.4      22 Nov 2020 05:41  Phos  3.8     11-22  Mg     2.5     11-22      Creatinine Trend: 6.50 <--, 4.98 <--, 7.71 <--, 6.55 <--, 10.30 <--, 9.86 <--, 9.92 <--                        8.2    4.61  )-----------( 172      ( 22 Nov 2020 05:41 )             27.3

## 2020-11-22 NOTE — PROGRESS NOTE ADULT - SUBJECTIVE AND OBJECTIVE BOX
[facility] Division of Hospital Medicine  Pablo Alves MD  Pager (M-F, 8A-5P): [phone]  Other Times:  [phone]    Patient is a 75y old  Male who presents with a chief complaint of AMS (21 Nov 2020 12:51)    SUBJECTIVE / OVERNIGHT EVENTS:    MEDICATIONS  (STANDING):  chlorhexidine 4% Liquid 1 Application(s) Topical <User Schedule>  epoetin katlyn-epbx (RETACRIT) Injectable 18371 Unit(s) IV Push <User Schedule>  heparin   Injectable 5000 Unit(s) SubCutaneous every 8 hours  piperacillin/tazobactam IVPB.. 3.375 Gram(s) IV Intermittent every 12 hours  polyethylene glycol 3350 17 Gram(s) Oral daily  senna 2 Tablet(s) Oral at bedtime  tiotropium 18 MICROgram(s) Capsule 1 Capsule(s) Inhalation daily    MEDICATIONS  (PRN):  ALBUTerol    90 MICROgram(s) HFA Inhaler 2 Puff(s) Inhalation every 6 hours PRN Shortness of Breath and/or Wheezing  aluminum hydroxide/magnesium hydroxide/simethicone Suspension 30 milliLiter(s) Oral every 4 hours PRN Dyspepsia  oxyCODONE    IR 5 milliGRAM(s) Oral every 4 hours PRN Moderate to severe pain      Vital Signs Last 24 Hrs  T(C): 36.3 (22 Nov 2020 10:15), Max: 36.7 (22 Nov 2020 05:47)  T(F): 97.3 (22 Nov 2020 10:15), Max: 98 (22 Nov 2020 05:47)  HR: 85 (22 Nov 2020 10:15) (76 - 90)  BP: 93/43 (22 Nov 2020 10:15) (93/43 - 131/65)  BP(mean): --  RR: 18 (22 Nov 2020 10:15) (18 - 18)  SpO2: 99% (22 Nov 2020 05:47) (99% - 99%)  CAPILLARY BLOOD GLUCOSE        I&O's Summary      PHYSICAL EXAM:  CONSTITUTIONAL: NAD, well-developed, thin  EYES: Conjunctiva and sclera clear  RESPIRATORY: Normal respiratory effort; lungs are clear to auscultation bilaterally  CARDIOVASCULAR: Regular rate and rhythm, normal S1 and S2, no murmur/rub/gallop; No lower extremity edema  ABDOMEN: Nontender to palpation, normoactive bowel sounds, no rebound/guarding  MUSCULOSKELETAL: No clubbing or cyanosis of digits  PSYCH: AAOx3  NEUROLOGY: Fine tremor in b/l upper extremities, hyperspasticity of LUE  SKIN: [facility]    LABS:                        8.2    4.61  )-----------( 172      ( 22 Nov 2020 05:41 )             27.3     11-22    134<L>  |  92<L>  |  54<H>  ----------------------------<  86  4.4   |  28  |  6.50<H>    Ca    9.4      22 Nov 2020 05:41  Phos  3.8     11-22  Mg     2.5     11-22                RADIOLOGY & ADDITIONAL TESTS:    Imaging Personally Reviewed:    Care Discussed with Consultants/Other Providers:   [facility] Division of Hospital Medicine  Pablo Alves MD  Pager (M-F, 8A-5P): [phone]  Other Times:  [phone]    Patient is a 75y old  Male who presents with a chief complaint of AMS (21 Nov 2020 12:51)    SUBJECTIVE / OVERNIGHT EVENTS:  No new events overnight.  No new complaints.  No F/C, N/V, CP, SOB, Cough, lightheadedness, dizziness, abdominal pain, diarrhea, dysuria.      MEDICATIONS  (STANDING):  chlorhexidine 4% Liquid 1 Application(s) Topical <User Schedule>  epoetin katlyn-epbx (RETACRIT) Injectable 38021 Unit(s) IV Push <User Schedule>  heparin   Injectable 5000 Unit(s) SubCutaneous every 8 hours  piperacillin/tazobactam IVPB.. 3.375 Gram(s) IV Intermittent every 12 hours  polyethylene glycol 3350 17 Gram(s) Oral daily  senna 2 Tablet(s) Oral at bedtime  tiotropium 18 MICROgram(s) Capsule 1 Capsule(s) Inhalation daily    MEDICATIONS  (PRN):  ALBUTerol    90 MICROgram(s) HFA Inhaler 2 Puff(s) Inhalation every 6 hours PRN Shortness of Breath and/or Wheezing  aluminum hydroxide/magnesium hydroxide/simethicone Suspension 30 milliLiter(s) Oral every 4 hours PRN Dyspepsia  oxyCODONE    IR 5 milliGRAM(s) Oral every 4 hours PRN Moderate to severe pain      Vital Signs Last 24 Hrs  T(C): 36.3 (22 Nov 2020 10:15), Max: 36.7 (22 Nov 2020 05:47)  T(F): 97.3 (22 Nov 2020 10:15), Max: 98 (22 Nov 2020 05:47)  HR: 85 (22 Nov 2020 10:15) (76 - 90)  BP: 93/43 (22 Nov 2020 10:15) (93/43 - 131/65)  BP(mean): --  RR: 18 (22 Nov 2020 10:15) (18 - 18)  SpO2: 99% (22 Nov 2020 05:47) (99% - 99%)  CAPILLARY BLOOD GLUCOSE        I&O's Summary      PHYSICAL EXAM:  CONSTITUTIONAL: NAD, well-developed, thin  EYES: Conjunctiva and sclera clear  RESPIRATORY: Normal respiratory effort; lungs are clear to auscultation bilaterally  CARDIOVASCULAR: Regular rate and rhythm, normal S1 and S2, no murmur/rub/gallop; No lower extremity edema  ABDOMEN: Nontender to palpation, normoactive bowel sounds, no rebound/guarding  MUSCULOSKELETAL: No clubbing or cyanosis of digits  PSYCH: AAOx3  NEUROLOGY: Fine tremor in b/l upper extremities, hyperspasticity of LUE  SKIN: LUE AVF    LABS:                        8.2    4.61  )-----------( 172      ( 22 Nov 2020 05:41 )             27.3     11-22    134<L>  |  92<L>  |  54<H>  ----------------------------<  86  4.4   |  28  |  6.50<H>    Ca    9.4      22 Nov 2020 05:41  Phos  3.8     11-22  Mg     2.5     11-22                RADIOLOGY & ADDITIONAL TESTS:    Imaging Personally Reviewed:    Care Discussed with Consultants/Other Providers:

## 2020-11-22 NOTE — PROGRESS NOTE ADULT - ATTENDING COMMENTS
contact with question   cell [phone]  [facility]- [phone]

## 2020-11-22 NOTE — PROGRESS NOTE ADULT - ATTENDING COMMENTS
Disposition is challenging because while patient's wish is to come home rather [facility]/LTC/SNF, however he is hesitant to discontinue HD to qualify for home hospice.  Continue discussion. Disposition is challenging because while patient's wish is to come home rather than [facility]/LTC/SNF, however he is hesitant to discontinue HD to qualify for home hospice.  Continue discussion.

## 2020-11-23 LAB
ANION GAP SERPL CALC-SCNC: 12 MMO/L — SIGNIFICANT CHANGE UP (ref 7–14)
BUN SERPL-MCNC: 38 MG/DL — HIGH (ref 7–23)
CALCIUM SERPL-MCNC: 9.3 MG/DL — SIGNIFICANT CHANGE UP (ref 8.4–10.5)
CHLORIDE SERPL-SCNC: 95 MMOL/L — LOW (ref 98–107)
CO2 SERPL-SCNC: 29 MMOL/L — SIGNIFICANT CHANGE UP (ref 22–31)
CREAT SERPL-MCNC: 5.05 MG/DL — HIGH (ref 0.5–1.3)
CULTURE RESULTS: SIGNIFICANT CHANGE UP
CULTURE RESULTS: SIGNIFICANT CHANGE UP
GLUCOSE SERPL-MCNC: 97 MG/DL — SIGNIFICANT CHANGE UP (ref 70–99)
HCT VFR BLD CALC: 27.1 % — LOW (ref 39–50)
HGB BLD-MCNC: 8 G/DL — LOW (ref 13–17)
MAGNESIUM SERPL-MCNC: 2.3 MG/DL — SIGNIFICANT CHANGE UP (ref 1.6–2.6)
MCHC RBC-ENTMCNC: 24.8 PG — LOW (ref 27–34)
MCHC RBC-ENTMCNC: 29.5 % — LOW (ref 32–36)
MCV RBC AUTO: 83.9 FL — SIGNIFICANT CHANGE UP (ref 80–100)
NRBC # FLD: 0 K/UL — SIGNIFICANT CHANGE UP (ref 0–0)
PHOSPHATE SERPL-MCNC: 3.2 MG/DL — SIGNIFICANT CHANGE UP (ref 2.5–4.5)
PLATELET # BLD AUTO: 182 K/UL — SIGNIFICANT CHANGE UP (ref 150–400)
PMV BLD: 10 FL — SIGNIFICANT CHANGE UP (ref 7–13)
POTASSIUM SERPL-MCNC: 3.8 MMOL/L — SIGNIFICANT CHANGE UP (ref 3.5–5.3)
POTASSIUM SERPL-SCNC: 3.8 MMOL/L — SIGNIFICANT CHANGE UP (ref 3.5–5.3)
RBC # BLD: 3.23 M/UL — LOW (ref 4.2–5.8)
RBC # FLD: 23.3 % — HIGH (ref 10.3–14.5)
SODIUM SERPL-SCNC: 136 MMOL/L — SIGNIFICANT CHANGE UP (ref 135–145)
SPECIMEN SOURCE: SIGNIFICANT CHANGE UP
SPECIMEN SOURCE: SIGNIFICANT CHANGE UP
WBC # BLD: 4.73 K/UL — SIGNIFICANT CHANGE UP (ref 3.8–10.5)
WBC # FLD AUTO: 4.73 K/UL — SIGNIFICANT CHANGE UP (ref 3.8–10.5)

## 2020-11-23 PROCEDURE — 99232 SBSQ HOSP IP/OBS MODERATE 35: CPT | Mod: GC

## 2020-11-23 PROCEDURE — 99233 SBSQ HOSP IP/OBS HIGH 50: CPT

## 2020-11-23 RX ORDER — PIPERACILLIN AND TAZOBACTAM 4; .5 G/20ML; G/20ML
3.38 INJECTION, POWDER, LYOPHILIZED, FOR SOLUTION INTRAVENOUS ONCE
Refills: 0 | Status: COMPLETED | OUTPATIENT
Start: 2020-11-23 | End: 2020-11-23

## 2020-11-23 RX ADMIN — TIOTROPIUM BROMIDE 1 CAPSULE(S): 18 CAPSULE ORAL; RESPIRATORY (INHALATION) at 09:36

## 2020-11-23 RX ADMIN — OXYCODONE HYDROCHLORIDE 5 MILLIGRAM(S): 5 TABLET ORAL at 17:51

## 2020-11-23 RX ADMIN — HEPARIN SODIUM 5000 UNIT(S): 5000 INJECTION INTRAVENOUS; SUBCUTANEOUS at 06:19

## 2020-11-23 RX ADMIN — OXYCODONE HYDROCHLORIDE 5 MILLIGRAM(S): 5 TABLET ORAL at 10:35

## 2020-11-23 RX ADMIN — PIPERACILLIN AND TAZOBACTAM 25 GRAM(S): 4; .5 INJECTION, POWDER, LYOPHILIZED, FOR SOLUTION INTRAVENOUS at 06:20

## 2020-11-23 RX ADMIN — PIPERACILLIN AND TAZOBACTAM 25 GRAM(S): 4; .5 INJECTION, POWDER, LYOPHILIZED, FOR SOLUTION INTRAVENOUS at 17:46

## 2020-11-23 RX ADMIN — HEPARIN SODIUM 5000 UNIT(S): 5000 INJECTION INTRAVENOUS; SUBCUTANEOUS at 21:58

## 2020-11-23 RX ADMIN — OXYCODONE HYDROCHLORIDE 5 MILLIGRAM(S): 5 TABLET ORAL at 11:05

## 2020-11-23 RX ADMIN — CHLORHEXIDINE GLUCONATE 1 APPLICATION(S): 213 SOLUTION TOPICAL at 09:37

## 2020-11-23 RX ADMIN — HEPARIN SODIUM 5000 UNIT(S): 5000 INJECTION INTRAVENOUS; SUBCUTANEOUS at 15:36

## 2020-11-23 RX ADMIN — Medication 30 MILLILITER(S): at 17:51

## 2020-11-23 RX ADMIN — SENNA PLUS 2 TABLET(S): 8.6 TABLET ORAL at 21:58

## 2020-11-23 NOTE — PROGRESS NOTE ADULT - PROBLEM SELECTOR PLAN 2
Given time course with initiation of Sunitinib, may be 2/2 medication (25-39% incidence per UTD). Possibly from IVC compression with necrotic lymph node or tumor invasion. Lactate improved from 8 to 1.7. No peritoneal signs on exam. CT noted   - Oxycodone 5mg PO q4h PRN for pain  - Palliative care consulted  - Bowel regimen  - US b/l LE venous dopplers negative for DVT

## 2020-11-23 NOTE — PROGRESS NOTE ADULT - SUBJECTIVE AND OBJECTIVE BOX
SUBJECTIVE AND OBJECTIVE:  pt complains of pain in abdomen.  Improves with oxy ir.  +BM.  tolerating HD  INTERVAL HPI/OVERNIGHT EVENTS:    DNR on chart:   Allergies    No Known Allergies    Intolerances    MEDICATIONS  (STANDING):  chlorhexidine 4% Liquid 1 Application(s) Topical <User Schedule>  chlorhexidine 4% Liquid 1 Application(s) Topical <User Schedule>  epoetin katlyn-epbx (RETACRIT) Injectable 22899 Unit(s) IV Push <User Schedule>  heparin   Injectable 5000 Unit(s) SubCutaneous every 8 hours  piperacillin/tazobactam IVPB. 3.375 Gram(s) IV Intermittent once  polyethylene glycol 3350 17 Gram(s) Oral daily  senna 2 Tablet(s) Oral at bedtime  tiotropium 18 MICROgram(s) Capsule 1 Capsule(s) Inhalation daily    MEDICATIONS  (PRN):  ALBUTerol    90 MICROgram(s) HFA Inhaler 2 Puff(s) Inhalation every 6 hours PRN Shortness of Breath and/or Wheezing  aluminum hydroxide/magnesium hydroxide/simethicone Suspension 30 milliLiter(s) Oral every 4 hours PRN Dyspepsia  oxyCODONE    IR 5 milliGRAM(s) Oral every 4 hours PRN Moderate to severe pain      ITEMS UNCHECKED ARE NOT PRESENT    PRESENT SYMPTOMS: [ ]Unable to obtain due to poor mentation   Source if other than patient:  [ ]Family   [ ]Team     Pain (Impact on QOL):  yes  Location: abdomen  Minimal acceptable level (0-10 scale):       4/10     Aggravating factors: eating  Quality: dull  Radiation: none  Severity (0-10 scale):  6/10  Timing: comes and goes    Dyspnea:                           [ ]Mild [ ]Moderate [ ]Severe  Anxiety:                             [ ]Mild [ ]Moderate [ ]Severe  Fatigue:                             [x ]Mild [ ]Moderate [ ]Severe  Nausea:                             [ ]Mild [ ]Moderate [ ]Severe  Loss of appetite:              [ ]Mild [ ]Moderate [ ]Severe  Constipation:                    [ ]Mild [ ]Moderate [ ]Severe    PAIN AD Score:	  http://geriatrictoolkit.[place].[email or web address]/cog/painad.pdf (Ctrl + left click to view)    Other Symptoms:  [x ]All other review of systems negative     Karnofsky Performance Score/Palliative Performance Status Version 2:   70      %    http://palliative.info/resource_material/PPSv2.pdf  PHYSICAL EXAM:  Vital Signs Last 24 Hrs  T(C): 36.4 (23 Nov 2020 06:18), Max: 36.6 (22 Nov 2020 22:49)  T(F): 97.6 (23 Nov 2020 06:18), Max: 97.8 (22 Nov 2020 22:49)  HR: 86 (23 Nov 2020 06:18) (86 - 87)  BP: 123/61 (23 Nov 2020 06:18) (118/60 - 123/61)  BP(mean): --  RR: 17 (23 Nov 2020 06:18) (17 - 17)  SpO2: 100% (23 Nov 2020 06:18) (100% - 100%) I&O's Summary    22 Nov 2020 07:01  -  23 Nov 2020 07:00  --------------------------------------------------------  IN: 400 mL / OUT: 1600 mL / NET: -1200 mL     GENERAL:  [x ]Alert  [ x]Oriented x  3 [ ]Lethargic  [ ]Cachexia  [ ]Unarousable  [ ]Verbal  [ ]Non-Verbal    Behavioral:   [ ] Anxiety  [ ] Delirium [ ] Agitation [ ] Other    HEENT:  [ ]Normal   [ x]Dry mouth   [ ]ET Tube/Trach  [ ]Oral lesions    PULMONARY:   [x ]Clear [ ]Tachypnea  [ ]Audible excessive secretions   [ ]Rhonchi        [ ]Right [ ]Left [ ]Bilateral  [ ]Crackles        [ ]Right [ ]Left [ ]Bilateral  [ ]Wheezing     [ ]Right [ ]Left [ ]Bilateral    CARDIOVASCULAR:    [x ]Regular [ ]Irregular [ ]Tachy  [ ]Froy [ ]Murmur [ ]Other    GASTROINTESTINAL:  [x ]Soft  [ ]Distended   [x ]+BS  [ ]Non tender [x ]Tender  [ ]PEG [ ]OGT/ NGT   Last BM:    GENITOURINARY:  [ ]Normal [ ] Incontinent   [x ]Oliguria/Anuria   [ ]Marc    MUSCULOSKELETAL:   [ ]Normal   [x ]Weakness  [ ]Bed/Wheelchair bound [ ]Edema    NEUROLOGIC:   [ x]No focal deficits  [ ] Cognitive impairment  [ ] Dysphagia [ ]Dysarthria [ ] Paresis [ ]Other     SKIN:   [x ]Normal   [ ]Pressure ulcer(s)  [ ]Rash    CRITICAL CARE:  [ ] Shock Present  [ ]Septic [ ]Cardiogenic [ ]Neurologic [ ]Hypovolemic  [ ]  Vasopressors [ ]  Inotropes   [ ] Respiratory failure present  [ ] Acute  [ ] Chronic [ ] Hypoxic  [ ] Hypercarbic [ ] Other  [ ] Other organ failure     LABS:                        8.0    4.73  )-----------( 182      ( 23 Nov 2020 06:50 )             27.1   11-23    136  |  95<L>  |  38<H>  ----------------------------<  97  3.8   |  29  |  5.05<H>    Ca    9.3      23 Nov 2020 06:50  Phos  3.2     11-23  Mg     2.3     11-23          RADIOLOGY & ADDITIONAL STUDIES:    Protein Calorie Malnutrition Present: [ ] yes [ ] no  [ ] PPSV2 < or = 30%  [ ] significant weight loss [ ] poor nutritional intake [ ] anasarca [ ] catabolic state Albumin, Serum: 3.6 g/dL (11-17-20 @ 18:40)  Artificial Nutrition [ ]     REFERRALS:   [ ]Chaplaincy  [ ] Hospice  [ ]Child Life  [ ]Social Work  [ ]Case management [ ]Holistic Therapy   Goals of Care Document: MONSERRAT Hutchison (11-20-20 @ 20:24)  Goals of Care Conversation:   Advance Directives:  · Caregiver:  yes  · Name  Paolo Whyte  · Phone Number  [phone]    Conversation Discussion:  · Conversation  Hospice Referral  · Conversation Details  HOSPICE CARE NETWORK    [facility] RN collaborated with [facility] MD for eligibility for home hospice care for pt. HCN MD found pt ineligible - if hemodialysis is to be continued. If hemodialysis is discontinued, [facility] RN will present case again for approval.      Electronic Signatures:  Jennifer Hutchison (QUENTIN)  (Signed 20-Nov-2020 20:26)  	Authored: Goals of Care Conversation      Last Updated: 20-Nov-2020 20:26 by Jennifer Hutchison (QUENTIN)

## 2020-11-23 NOTE — PROGRESS NOTE ADULT - PROBLEM SELECTOR PLAN 2
spoke at length with pt and sister  pt going to continue dmt at this time  he is aware that cancer is metastatic in nature and not curable  sister as well understands that it is not curative

## 2020-11-23 NOTE — PROGRESS NOTE ADULT - ASSESSMENT
75 M PMHx ESRD on HD (TTS), emphysema, renal cancer (diagnosed 3 months ago) on sunitinib, HTN, HLD presented to ED with metabolic encephalopathy d/t sepsis possibly from necrotic lymph node .

## 2020-11-23 NOTE — PROGRESS NOTE ADULT - SUBJECTIVE AND OBJECTIVE BOX
[facility] - S Monico / Mayi S /D Terra/ S Chanel/ ERNESTNIE Snow/ Allan Ortiz / M Bernardau/ O Fang  service -[phone], office [phone]  ---------------------------------------------------------------------------------------------------------------    Patient seen and examined bedside    Subjective and Objective: No overnight events, sob resolved. No complaints today. feeling better    Allergies: No Known Allergies      Hospital Medications:   MEDICATIONS  (STANDING):  chlorhexidine 4% Liquid 1 Application(s) Topical <User Schedule>  chlorhexidine 4% Liquid 1 Application(s) Topical <User Schedule>  epoetin katlyn-epbx (RETACRIT) Injectable 66177 Unit(s) IV Push <User Schedule>  heparin   Injectable 5000 Unit(s) SubCutaneous every 8 hours  piperacillin/tazobactam IVPB. 3.375 Gram(s) IV Intermittent once  polyethylene glycol 3350 17 Gram(s) Oral daily  senna 2 Tablet(s) Oral at bedtime  tiotropium 18 MICROgram(s) Capsule 1 Capsule(s) Inhalation daily      REVIEW OF SYSTEMS:  CONSTITUTIONAL: No weakness, fevers or chills  EYES/ENT: No visual changes;  No vertigo or throat pain   NECK: No pain or stiffness  RESPIRATORY: No cough, wheezing, hemoptysis; No shortness of breath  CARDIOVASCULAR: No chest pain or palpitations.  GASTROINTESTINAL: No abdominal or epigastric pain. No nausea, vomiting, or hematemesis; No diarrhea or constipation. No melena or hematochezia.  GENITOURINARY: No dysuria, frequency, foamy urine, urinary urgency, incontinence or hematuria  NEUROLOGICAL: No numbness or weakness  SKIN: No itching, burning, rashes, or lesions   VASCULAR: No bilateral lower extremity edema.   All other review of systems is negative unless indicated above.    VITALS:  T(F): 97.6 (11-23-20 @ 06:18), Max: 97.8 (11-22-20 @ 22:49)  HR: 86 (11-23-20 @ 06:18)  BP: 123/61 (11-23-20 @ 06:18)  RR: 17 (11-23-20 @ 06:18)  SpO2: 100% (11-23-20 @ 06:18)  Wt(kg): --    11-22 @ 07:01  -  11-23 @ 07:00  --------------------------------------------------------  IN: 400 mL / OUT: 1600 mL / NET: -1200 mL          PHYSICAL EXAM:  Constitutional: NAD  HEENT: anicteric sclera, oropharynx clear  Neck: No JVD  Respiratory: CTAB, no wheezes, rales or rhonchi  Cardiovascular: S1, S2, RRR  Gastrointestinal: BS+, soft, NT/ND  Extremities: No cyanosis or clubbing. No peripheral edema  Neurological: A/O x 3, no focal deficits  Psychiatric: Normal mood, normal affect  : No CVA tenderness. No wheatley.   Skin: No rashes  Vascular Access:    LABS:  11-23    136  |  95<L>  |  38<H>  ----------------------------<  97  3.8   |  29  |  5.05<H>    Ca    9.3      23 Nov 2020 06:50  Phos  3.2     11-23  Mg     2.3     11-23      Creatinine Trend: 5.05 <--, 6.50 <--, 4.98 <--, 7.71 <--, 6.55 <--, 10.30 <--, 9.86 <--, 9.92 <--                        8.0    4.73  )-----------( 182      ( 23 Nov 2020 06:50 )             27.1     Urine Studies:        RADIOLOGY & ADDITIONAL STUDIES:   [facility] - S Monico / Mayi S /D Terra/ S Chanel/ S Gwendolyn/ Allan Ortiz / M Shaheen/ O Fang  service -[phone], office [phone]  ---------------------------------------------------------------------------------------------------------------    Patient seen and examined bedside    Subjective and Objective: No overnight events, denied sob. No new complaints today. poor po intake    Allergies: No Known Allergies      Hospital Medications:   MEDICATIONS  (STANDING):  chlorhexidine 4% Liquid 1 Application(s) Topical <User Schedule>  chlorhexidine 4% Liquid 1 Application(s) Topical <User Schedule>  epoetin katlyn-epbx (RETACRIT) Injectable 47403 Unit(s) IV Push <User Schedule>  heparin   Injectable 5000 Unit(s) SubCutaneous every 8 hours  piperacillin/tazobactam IVPB. 3.375 Gram(s) IV Intermittent once  polyethylene glycol 3350 17 Gram(s) Oral daily  senna 2 Tablet(s) Oral at bedtime  tiotropium 18 MICROgram(s) Capsule 1 Capsule(s) Inhalation daily    VITALS:  T(F): 97.6 (11-23-20 @ 06:18), Max: 97.8 (11-22-20 @ 22:49)  HR: 86 (11-23-20 @ 06:18)  BP: 123/61 (11-23-20 @ 06:18)  RR: 17 (11-23-20 @ 06:18)  SpO2: 100% (11-23-20 @ 06:18)  Wt(kg): --    11-22 @ 07:01  -  11-23 @ 07:00  --------------------------------------------------------  IN: 400 mL / OUT: 1600 mL / NET: -1200 mL      PHYSICAL EXAM:  HEENT: anicteric sclera  Neck: No JVD  Respiratory: CTAB, no wheezes, rales or rhonchi  Cardiovascular: S1, S2, RRR  Gastrointestinal: BS+, soft, NT/ND  Extremities: No peripheral edema  Neurological: A/O x 3  Psychiatric: Normal mood, normal affect  : No wheatley.   Vascular Access: AVF+thrill    LABS:  11-23    136  |  95<L>  |  38<H>  ----------------------------<  97  3.8   |  29  |  5.05<H>    Ca    9.3      23 Nov 2020 06:50  Phos  3.2     11-23  Mg     2.3     11-23      Creatinine Trend: 5.05 <--, 6.50 <--, 4.98 <--, 7.71 <--, 6.55 <--, 10.30 <--, 9.86 <--, 9.92 <--                        8.0    4.73  )-----------( 182      ( 23 Nov 2020 06:50 )             27.1     Urine Studies:        RADIOLOGY & ADDITIONAL STUDIES:

## 2020-11-23 NOTE — PROGRESS NOTE ADULT - PROBLEM SELECTOR PLAN 4
Collateral information obtained from patient's oncologist Dr. 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 He had CT imaging consistent with liver metastases at the time  -Patient was prescribed Sutinib in July 2020, which he had trouble obtaining due to its cost- had poor follow up and was not seen again in the office until September when cost was addressed- did not start Sutinib until October 11th  -Previous imaging showed sclerotic lesion in T8, with possible other bony metastases- an MRI of the spine was ordered, which patient did not get done  -Patient 1 month ago complained of worsening dizziness, MRI head was ordered to r/o metastatic disease, which patient did not get done  -Per Dr. Castillo, palliative care is appropriate, although it is hard to say if this is progression of disease on current chemotherapy given questionable patient compliance and delay in starting treatment since initial scans were done  -MRI of the head and spine w/wo contrast pending while inpatient to assess for metastatic disease  -Hold Sutinib in setting of infection  - PT declined further chemo

## 2020-11-23 NOTE — PROGRESS NOTE ADULT - ASSESSMENT
75M PMHx ESRD on HD (TTS), emphysema, renal cancer (diagnosed 3 months ago) on sunitinib, HTN, HLD presented to ED with sepsis and AMS.  Renal following for ESRD Mx.     ESRD (end stage renal disease) on dialysis.   Maintenance HD schedule TTS, currently off schedule.- tolerating hemodialysis well, blood pressure lower begening of hemodialysis but not stable, Ultrafiltration on Dialysis as tolerated  Electrolytes and volume status acceptable. s/p hemodialysis SAT.   renal restrictions in diet  dose all meds for ESRD  Discussed with pt regarding pts wishes with HD and GOC, should his condition further deteriorates from metastatic cancer.   Explained to pt, that it would be appropriate to stop HD, should he becomes too weak to tolerate it. Pt understands his health is declining, however he says he is not ready to stop HD at this time.     HTN, bp well controlled. stable. not on meds  Anemia in CKD- Hg <goal  AMS -resolved. pt at baseline now       75M PMHx ESRD on HD (TTS), emphysema, renal cancer (diagnosed 3 months ago) on sunitinib, HTN, HLD presented to ED with sepsis and AMS.  Renal following for ESRD Mx.     ESRD (end stage renal disease) on dialysis.   Maintenance HD schedule TTS, currently off schedule.- tolerating hemodialysis well, blood pressure lower begening of hemodialysis but not stable, Ultrafiltration on Dialysis as tolerated  Electrolytes and volume status acceptable.               s/p HD yesterday, Rx sheet reviewed, net UF 1.2kg, tolerated well. uneventful.              plan to rpt HD tomorrow (holiday schedule)  renal restrictions in diet  dose all meds for ESRD  Discussed with pt regarding pts wishes with HD and GOC, should his condition further deteriorates from metastatic cancer.   Explained to pt, that it would be appropriate to stop HD, should he becomes too weak to tolerate it. Pt understands his health is declining, however he says he is not ready to stop HD at this time. f/u w/Palliative eval    HTN, bp well controlled. stable. not on meds  Anemia in CKD- Hg <goal  AMS -resolved. pt at baseline now  labs, chart reviewed

## 2020-11-23 NOTE — PROGRESS NOTE ADULT - PROBLEM SELECTOR PLAN 8
DVT PPx: HSQ  Diet- renal replacement  Dispo: PT recommending home with home PT. D/w patient's sister Paolo, patient not compliant with medical follow up and does not seem able to care for himself at home. Palliative care team and CM following for dispo planning - leaning towards LTC placement as pt wishes to cw HD making hospice not an option. MR imaging should not hold discharge as will not  at this time  Time spent 35 min

## 2020-11-23 NOTE — PROGRESS NOTE ADULT - PROBLEM SELECTOR PLAN 1
with sepsis presumed source infected necrotic lymph node  T 101.5, , Lactate 8 on admission. Possible source is intraabdominal given severe pain. Possible necrotic lymph node seen on CT  - completing Zosyn today - 5 day course   - F/u blood cultures- NGTD  - resolved

## 2020-11-23 NOTE — PROGRESS NOTE ADULT - PROBLEM SELECTOR PLAN 5
overall prognosis is poor  started discussing about AD, full code for now  goal is rehab with possible transition to ltc  no further dmt of renal cell ca at this time  discussed need to change code status with sister once pt unable to tolerate HD

## 2020-11-23 NOTE — PROGRESS NOTE ADULT - SUBJECTIVE AND OBJECTIVE BOX
Patient is a 75y old  Male who presents with a chief complaint of AMS (22 Nov 2020 12:46)      SUBJECTIVE / OVERNIGHT EVENTS: c/o same abd pain, + BM, no reported N/V/D, shanta po. now remains afebrile     ROS:  No HA/DZ  No Vision changes   No CP, SOB  No N/V/D  No Edema  No Rash      MEDICATIONS  (STANDING):  chlorhexidine 4% Liquid 1 Application(s) Topical <User Schedule>  chlorhexidine 4% Liquid 1 Application(s) Topical <User Schedule>  epoetin katlyn-epbx (RETACRIT) Injectable 09696 Unit(s) IV Push <User Schedule>  heparin   Injectable 5000 Unit(s) SubCutaneous every 8 hours  piperacillin/tazobactam IVPB. 3.375 Gram(s) IV Intermittent once  polyethylene glycol 3350 17 Gram(s) Oral daily  senna 2 Tablet(s) Oral at bedtime  tiotropium 18 MICROgram(s) Capsule 1 Capsule(s) Inhalation daily    MEDICATIONS  (PRN):  ALBUTerol    90 MICROgram(s) HFA Inhaler 2 Puff(s) Inhalation every 6 hours PRN Shortness of Breath and/or Wheezing  aluminum hydroxide/magnesium hydroxide/simethicone Suspension 30 milliLiter(s) Oral every 4 hours PRN Dyspepsia  oxyCODONE    IR 5 milliGRAM(s) Oral every 4 hours PRN Moderate to severe pain      T(C): 36.4 (11-23-20 @ 06:18)  HR: 86 (11-23-20 @ 06:18)  BP: 123/61 (11-23-20 @ 06:18)  RR: 17 (11-23-20 @ 06:18)  SpO2: 100% (11-23-20 @ 06:18)  CAPILLARY BLOOD GLUCOSE        I&O's Summary    22 Nov 2020 07:01  -  23 Nov 2020 07:00  --------------------------------------------------------  IN: 400 mL / OUT: 1600 mL / NET: -1200 mL        PHYSICAL EXAM:  GENERAL: NAD, Thin   HEAD:  Atraumatic, Normocephalic  EYES: EOMI, PERRL, conjunctiva and sclera clear  NECK: Supple, No JVD  CHEST/LUNG: Clear to auscultation bilaterally  HEART: Regular rate and rhythm; No murmurs, rubs, or gallops, No Edema  ABDOMEN: Soft, Nontender, Nondistended; Bowel sounds present  EXTREMITIES:  2+ Peripheral Pulses, No clubbing, cyanosis  PSYCH: No SI/HI  NEUROLOGY: non-focal  SKIN: LUE AVF    LABS:                        8.0    4.73  )-----------( 182      ( 23 Nov 2020 06:50 )             27.1     11-23    136  |  95<L>  |  38<H>  ----------------------------<  97  3.8   |  29  |  5.05<H>    Ca    9.3      23 Nov 2020 06:50  Phos  3.2     11-23  Mg     2.3     11-23                    RADIOLOGY & ADDITIONAL TESTS:    Imaging Personally Reviewed:    Consultant(s) Notes Reviewed:      Care Discussed with Consultants/Other Providers:

## 2020-11-24 LAB
ANION GAP SERPL CALC-SCNC: 17 MMO/L — HIGH (ref 7–14)
BUN SERPL-MCNC: 56 MG/DL — HIGH (ref 7–23)
CALCIUM SERPL-MCNC: 9.4 MG/DL — SIGNIFICANT CHANGE UP (ref 8.4–10.5)
CHLORIDE SERPL-SCNC: 93 MMOL/L — LOW (ref 98–107)
CO2 SERPL-SCNC: 25 MMOL/L — SIGNIFICANT CHANGE UP (ref 22–31)
CREAT SERPL-MCNC: 6.43 MG/DL — HIGH (ref 0.5–1.3)
GLUCOSE SERPL-MCNC: 81 MG/DL — SIGNIFICANT CHANGE UP (ref 70–99)
HCT VFR BLD CALC: 29.4 % — LOW (ref 39–50)
HGB BLD-MCNC: 8.6 G/DL — LOW (ref 13–17)
MAGNESIUM SERPL-MCNC: 2.6 MG/DL — SIGNIFICANT CHANGE UP (ref 1.6–2.6)
MCHC RBC-ENTMCNC: 24.6 PG — LOW (ref 27–34)
MCHC RBC-ENTMCNC: 29.3 % — LOW (ref 32–36)
MCV RBC AUTO: 84.2 FL — SIGNIFICANT CHANGE UP (ref 80–100)
NRBC # FLD: 0 K/UL — SIGNIFICANT CHANGE UP (ref 0–0)
PHOSPHATE SERPL-MCNC: 3.5 MG/DL — SIGNIFICANT CHANGE UP (ref 2.5–4.5)
PLATELET # BLD AUTO: 191 K/UL — SIGNIFICANT CHANGE UP (ref 150–400)
PMV BLD: 10.1 FL — SIGNIFICANT CHANGE UP (ref 7–13)
POTASSIUM SERPL-MCNC: 5.1 MMOL/L — SIGNIFICANT CHANGE UP (ref 3.5–5.3)
POTASSIUM SERPL-SCNC: 5.1 MMOL/L — SIGNIFICANT CHANGE UP (ref 3.5–5.3)
RBC # BLD: 3.49 M/UL — LOW (ref 4.2–5.8)
RBC # FLD: 23.7 % — HIGH (ref 10.3–14.5)
SODIUM SERPL-SCNC: 135 MMOL/L — SIGNIFICANT CHANGE UP (ref 135–145)
WBC # BLD: 4.34 K/UL — SIGNIFICANT CHANGE UP (ref 3.8–10.5)
WBC # FLD AUTO: 4.34 K/UL — SIGNIFICANT CHANGE UP (ref 3.8–10.5)

## 2020-11-24 PROCEDURE — 99232 SBSQ HOSP IP/OBS MODERATE 35: CPT

## 2020-11-24 RX ADMIN — HEPARIN SODIUM 5000 UNIT(S): 5000 INJECTION INTRAVENOUS; SUBCUTANEOUS at 12:19

## 2020-11-24 RX ADMIN — HEPARIN SODIUM 5000 UNIT(S): 5000 INJECTION INTRAVENOUS; SUBCUTANEOUS at 22:27

## 2020-11-24 RX ADMIN — POLYETHYLENE GLYCOL 3350 17 GRAM(S): 17 POWDER, FOR SOLUTION ORAL at 12:19

## 2020-11-24 RX ADMIN — ERYTHROPOIETIN 10000 UNIT(S): 10000 INJECTION, SOLUTION INTRAVENOUS; SUBCUTANEOUS at 18:07

## 2020-11-24 RX ADMIN — TIOTROPIUM BROMIDE 1 CAPSULE(S): 18 CAPSULE ORAL; RESPIRATORY (INHALATION) at 12:19

## 2020-11-24 RX ADMIN — SENNA PLUS 2 TABLET(S): 8.6 TABLET ORAL at 22:27

## 2020-11-24 RX ADMIN — OXYCODONE HYDROCHLORIDE 5 MILLIGRAM(S): 5 TABLET ORAL at 08:40

## 2020-11-24 RX ADMIN — HEPARIN SODIUM 5000 UNIT(S): 5000 INJECTION INTRAVENOUS; SUBCUTANEOUS at 05:39

## 2020-11-24 RX ADMIN — OXYCODONE HYDROCHLORIDE 5 MILLIGRAM(S): 5 TABLET ORAL at 09:10

## 2020-11-24 RX ADMIN — CHLORHEXIDINE GLUCONATE 1 APPLICATION(S): 213 SOLUTION TOPICAL at 12:18

## 2020-11-24 RX ADMIN — OXYCODONE HYDROCHLORIDE 5 MILLIGRAM(S): 5 TABLET ORAL at 13:09

## 2020-11-24 RX ADMIN — OXYCODONE HYDROCHLORIDE 5 MILLIGRAM(S): 5 TABLET ORAL at 13:39

## 2020-11-24 NOTE — PROGRESS NOTE ADULT - PROBLEM SELECTOR PLAN 8
DVT PPx: HSQ  Diet- renal replacement  Dispo: PT recommending home with home PT. D/w patient's sister Paolo, patient not compliant with medical follow up and does not seem able to care for himself at home. Palliative care team and CM following for dispo planning - leaning towards LTC placement as pt wishes to cw HD making hospice not an option.  stable for DC - time spent 35 min

## 2020-11-24 NOTE — PROGRESS NOTE ADULT - PROBLEM SELECTOR PLAN 1
with sepsis presumed source infected necrotic lymph node  T 101.5, , Lactate 8 on admission. Possible source is intraabdominal given severe pain. Possible necrotic lymph node seen on CT  - completed Zosyn - 5 day course   - F/u blood cultures- NGTD  - resolved

## 2020-11-24 NOTE — PROGRESS NOTE ADULT - ASSESSMENT
75M PMHx ESRD on HD (TTS), emphysema, renal cancer (diagnosed 3 months ago) on sunitinib, HTN, HLD presented to ED with sepsis and AMS.  Renal following for ESRD Mx.     ESRD (end stage renal disease) on dialysis.   Maintenance HD schedule TTS, Electrolytes and volume status acceptable.              plan to rpt HD today  renal restrictions in diet  dose all meds for ESRD  Discussed with pt regarding pts wishes with HD and GOC, should his condition further deteriorates from metastatic cancer.   Explained to pt, that it would be appropriate to stop HD, should he becomes too weak to tolerate it. Pt understands his health is declining, however he says he is not ready to stop HD at this time. f/u w/Palliative eval    HTN, bp well controlled. stable. not on meds  Anemia in CKD- Hg <goal  AMS -resolved. pt at baseline now  labs, chart reviewed

## 2020-11-24 NOTE — PROGRESS NOTE ADULT - ATTENDING COMMENTS
Yosi See MD  [facility]  Office [phone]  Ans Serv [phone] cell - [phone]

## 2020-11-24 NOTE — PROGRESS NOTE ADULT - SUBJECTIVE AND OBJECTIVE BOX
Patient is a 75y old  Male who presents with a chief complaint of AMS (23 Nov 2020 14:26)      SUBJECTIVE / OVERNIGHT EVENTS: no events, no new complaints     ROS:  No HA/DZ  No Vision changes   No CP, SOB  No N/V/D  No Edema  No Rash      MEDICATIONS  (STANDING):  chlorhexidine 4% Liquid 1 Application(s) Topical <User Schedule>  epoetin katlyn-epbx (RETACRIT) Injectable 78113 Unit(s) IV Push <User Schedule>  heparin   Injectable 5000 Unit(s) SubCutaneous every 8 hours  polyethylene glycol 3350 17 Gram(s) Oral daily  senna 2 Tablet(s) Oral at bedtime  tiotropium 18 MICROgram(s) Capsule 1 Capsule(s) Inhalation daily    MEDICATIONS  (PRN):  ALBUTerol    90 MICROgram(s) HFA Inhaler 2 Puff(s) Inhalation every 6 hours PRN Shortness of Breath and/or Wheezing  aluminum hydroxide/magnesium hydroxide/simethicone Suspension 30 milliLiter(s) Oral every 4 hours PRN Dyspepsia  oxyCODONE    IR 5 milliGRAM(s) Oral every 4 hours PRN Moderate to severe pain      T(C): 36.8 (11-24-20 @ 05:37)  HR: 84 (11-24-20 @ 05:37)  BP: 115/44 (11-24-20 @ 05:37)  RR: 18 (11-24-20 @ 05:37)  SpO2: 100% (11-24-20 @ 05:37)  CAPILLARY BLOOD GLUCOSE        I&O's Summary      PHYSICAL EXAM:  GENERAL: NAD, Thin   HEAD:  Atraumatic, Normocephalic  EYES: EOMI, PERRL, conjunctiva and sclera clear  NECK: Supple, No JVD  CHEST/LUNG: Clear to auscultation bilaterally  HEART: Regular rate and rhythm; No murmurs, rubs, or gallops, No Edema  ABDOMEN: Soft, Nontender, Nondistended; Bowel sounds present  EXTREMITIES:  2+ Peripheral Pulses, No clubbing, cyanosis    LABS:                        8.6    4.34  )-----------( 191      ( 24 Nov 2020 07:40 )             29.4     11-24    135  |  93<L>  |  56<H>  ----------------------------<  81  5.1   |  25  |  6.43<H>    Ca    9.4      24 Nov 2020 07:40  Phos  3.5     11-24  Mg     2.6     11-24                    RADIOLOGY & ADDITIONAL TESTS:    Imaging Personally Reviewed:    Consultant(s) Notes Reviewed:      Care Discussed with Consultants/Other Providers:

## 2020-11-24 NOTE — PROGRESS NOTE ADULT - SUBJECTIVE AND OBJECTIVE BOX
Nephrology Followup Note - [phone] - Dr See / Dr Marc / Dr Snow / Dr Ellison / Dr Buchanan / Dr Headley / Dr Ortiz / Dr Jamison  Pt seen and examined at bedside  No acute events overnight. Abd pain unchanged. Tolerating meals.     Allergies:  No Known Allergies    Hospital Medications:   MEDICATIONS  (STANDING):  chlorhexidine 4% Liquid 1 Application(s) Topical <User Schedule>  epoetin katlyn-epbx (RETACRIT) Injectable 77814 Unit(s) IV Push <User Schedule>  heparin   Injectable 5000 Unit(s) SubCutaneous every 8 hours  polyethylene glycol 3350 17 Gram(s) Oral daily  senna 2 Tablet(s) Oral at bedtime  tiotropium 18 MICROgram(s) Capsule 1 Capsule(s) Inhalation daily    VITALS:  T(F): 98.2 (11-24-20 @ 05:37), Max: 98.8 (11-23-20 @ 21:53)  HR: 84 (11-24-20 @ 05:37)  BP: 115/44 (11-24-20 @ 05:37)  RR: 18 (11-24-20 @ 05:37)  SpO2: 100% (11-24-20 @ 05:37)  Wt(kg): --      PHYSICAL EXAM:  Constitutional: NAD  HEENT: anicteric sclera, oropharynx clear, MMM  Neck: No JVD  Respiratory: CTAB, no wheezes, rales or rhonchi  Cardiovascular: S1, S2, RRR  Gastrointestinal: BS+, soft, NT/ND  Extremities: No cyanosis or clubbing. No peripheral edema  Neurological: A/O x 3, no focal deficits  Psychiatric: Normal mood, normal affect  : No CVA tenderness. No wheatley.   Skin: No rashes  Vascular Access: UE AV access +thrill and bruit.     LABS:  11-24    135  |  93<L>  |  56<H>  ----------------------------<  81  5.1   |  25  |  6.43<H>    Ca    9.4      24 Nov 2020 07:40  Phos  3.5     11-24  Mg     2.6     11-24      Creatinine Trend: 6.43 <--, 5.05 <--, 6.50 <--, 4.98 <--, 7.71 <--, 6.55 <--, 10.30 <--, 9.86 <--, 9.92 <--                        8.6    4.34  )-----------( 191      ( 24 Nov 2020 07:40 )             29.4     Urine Studies:      RADIOLOGY & ADDITIONAL STUDIES:

## 2020-11-24 NOTE — CHART NOTE - NSCHARTNOTEFT_GEN_A_CORE
Goal is for LTC with HD.  Not pursuing further dmt for rcc at this time. Pt and sister aware of overall prognosis.  At this time, pt remains full code but would be willing to amend if unable to tolerate HD.  Will sign off.   Please reconsult as needed- [phone].  Brigitte Fermin DO

## 2020-11-24 NOTE — PROGRESS NOTE ADULT - PROBLEM SELECTOR PLAN 4
Collateral information obtained from patient's oncologist Dr. 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 He had CT imaging consistent with liver metastases at the time  -Patient was prescribed Sutinib in July 2020, which he had trouble obtaining due to its cost- had poor follow up and was not seen again in the office until September when cost was addressed- did not start Sutinib until October 11th  -Previous imaging showed sclerotic lesion in T8, with possible other bony metastases- an MRI of the spine was ordered, which patient did not get done  -Patient 1 month ago complained of worsening dizziness, MRI head was ordered to r/o metastatic disease, which patient did not get done  -Per Dr. Castillo, palliative care is appropriate, although it is hard to say if this is progression of disease on current chemotherapy given questionable patient compliance and delay in starting treatment since initial scans were done  -DCd MR spine and head as with ESRD and need for Riley - dw renal, would need 3 sessions of HD to mitigate risk of NSF and then there is still possibility of adverse outcome and unsure if pt can tolerate back to back HD sessions with clinical status  - considering no plans for further DMT, MR imaging would not  - can be considered as out-pt as for staging purposes if GOC change   -Hold Sutinib in setting of infection  - PT declined further chemo

## 2020-11-25 ENCOUNTER — TRANSCRIPTION ENCOUNTER (OUTPATIENT)
Age: 75
End: 2020-11-25

## 2020-11-25 DIAGNOSIS — R53.1 WEAKNESS: ICD-10-CM

## 2020-11-25 LAB
HCT VFR BLD CALC: 28 % — LOW (ref 39–50)
HGB BLD-MCNC: 8 G/DL — LOW (ref 13–17)
MCHC RBC-ENTMCNC: 24.2 PG — LOW (ref 27–34)
MCHC RBC-ENTMCNC: 28.6 % — LOW (ref 32–36)
MCV RBC AUTO: 84.8 FL — SIGNIFICANT CHANGE UP (ref 80–100)
NRBC # FLD: 0 K/UL — SIGNIFICANT CHANGE UP (ref 0–0)
PLATELET # BLD AUTO: 103 K/UL — LOW (ref 150–400)
PMV BLD: SIGNIFICANT CHANGE UP FL (ref 7–13)
RBC # BLD: 3.3 M/UL — LOW (ref 4.2–5.8)
RBC # FLD: 24.1 % — HIGH (ref 10.3–14.5)
SARS-COV-2 RNA SPEC QL NAA+PROBE: SIGNIFICANT CHANGE UP
WBC # BLD: 5.63 K/UL — SIGNIFICANT CHANGE UP (ref 3.8–10.5)
WBC # FLD AUTO: 5.63 K/UL — SIGNIFICANT CHANGE UP (ref 3.8–10.5)

## 2020-11-25 PROCEDURE — 99232 SBSQ HOSP IP/OBS MODERATE 35: CPT

## 2020-11-25 PROCEDURE — 99223 1ST HOSP IP/OBS HIGH 75: CPT | Mod: GC

## 2020-11-25 PROCEDURE — 71045 X-RAY EXAM CHEST 1 VIEW: CPT | Mod: 26

## 2020-11-25 RX ORDER — VANCOMYCIN HCL 1 G
1000 VIAL (EA) INTRAVENOUS ONCE
Refills: 0 | Status: DISCONTINUED | OUTPATIENT
Start: 2020-11-25 | End: 2020-11-25

## 2020-11-25 RX ORDER — SENNA PLUS 8.6 MG/1
2 TABLET ORAL
Qty: 0 | Refills: 0 | DISCHARGE
Start: 2020-11-25

## 2020-11-25 RX ORDER — ERYTHROPOIETIN 10000 [IU]/ML
0 INJECTION, SOLUTION INTRAVENOUS; SUBCUTANEOUS
Qty: 0 | Refills: 0 | DISCHARGE
Start: 2020-11-25

## 2020-11-25 RX ORDER — NIFEDIPINE 30 MG
1 TABLET, EXTENDED RELEASE 24 HR ORAL
Qty: 0 | Refills: 0 | DISCHARGE

## 2020-11-25 RX ORDER — ACETAMINOPHEN 500 MG
650 TABLET ORAL ONCE
Refills: 0 | Status: COMPLETED | OUTPATIENT
Start: 2020-11-25 | End: 2020-11-25

## 2020-11-25 RX ORDER — PIPERACILLIN AND TAZOBACTAM 4; .5 G/20ML; G/20ML
3.38 INJECTION, POWDER, LYOPHILIZED, FOR SOLUTION INTRAVENOUS EVERY 12 HOURS
Refills: 0 | Status: DISCONTINUED | OUTPATIENT
Start: 2020-11-25 | End: 2020-11-25

## 2020-11-25 RX ORDER — OXYCODONE HYDROCHLORIDE 5 MG/1
1 TABLET ORAL
Qty: 0 | Refills: 0 | DISCHARGE
Start: 2020-11-25

## 2020-11-25 RX ORDER — SODIUM CHLORIDE 9 MG/ML
1000 INJECTION INTRAMUSCULAR; INTRAVENOUS; SUBCUTANEOUS ONCE
Refills: 0 | Status: DISCONTINUED | OUTPATIENT
Start: 2020-11-25 | End: 2020-11-25

## 2020-11-25 RX ORDER — POLYETHYLENE GLYCOL 3350 17 G/17G
17 POWDER, FOR SOLUTION ORAL
Qty: 0 | Refills: 0 | DISCHARGE
Start: 2020-11-25

## 2020-11-25 RX ORDER — SODIUM CHLORIDE 9 MG/ML
500 INJECTION INTRAMUSCULAR; INTRAVENOUS; SUBCUTANEOUS ONCE
Refills: 0 | Status: COMPLETED | OUTPATIENT
Start: 2020-11-25 | End: 2020-11-25

## 2020-11-25 RX ADMIN — Medication 650 MILLIGRAM(S): at 17:01

## 2020-11-25 RX ADMIN — HEPARIN SODIUM 5000 UNIT(S): 5000 INJECTION INTRAVENOUS; SUBCUTANEOUS at 05:29

## 2020-11-25 RX ADMIN — CHLORHEXIDINE GLUCONATE 1 APPLICATION(S): 213 SOLUTION TOPICAL at 10:28

## 2020-11-25 RX ADMIN — HEPARIN SODIUM 5000 UNIT(S): 5000 INJECTION INTRAVENOUS; SUBCUTANEOUS at 13:06

## 2020-11-25 RX ADMIN — OXYCODONE HYDROCHLORIDE 5 MILLIGRAM(S): 5 TABLET ORAL at 10:38

## 2020-11-25 RX ADMIN — SODIUM CHLORIDE 1000 MILLILITER(S): 9 INJECTION INTRAMUSCULAR; INTRAVENOUS; SUBCUTANEOUS at 17:01

## 2020-11-25 RX ADMIN — Medication 650 MILLIGRAM(S): at 17:38

## 2020-11-25 RX ADMIN — OXYCODONE HYDROCHLORIDE 5 MILLIGRAM(S): 5 TABLET ORAL at 11:25

## 2020-11-25 RX ADMIN — HEPARIN SODIUM 5000 UNIT(S): 5000 INJECTION INTRAVENOUS; SUBCUTANEOUS at 21:34

## 2020-11-25 RX ADMIN — TIOTROPIUM BROMIDE 1 CAPSULE(S): 18 CAPSULE ORAL; RESPIRATORY (INHALATION) at 10:29

## 2020-11-25 NOTE — PROGRESS NOTE ADULT - SUBJECTIVE AND OBJECTIVE BOX
[facility] - S Monico / Mayi S /D Terra/ S Chanel/ ERNESTINE Snow/ Allan Ortiz / M Bernardau/ O Fang  service -[phone], office [phone]  ---------------------------------------------------------------------------------------------------------------    Patient seen and examined bedside    Subjective and Objective: No overnight events, sob resolved. No complaints today. feeling better    Allergies: No Known Allergies      Hospital Medications:   MEDICATIONS  (STANDING):  chlorhexidine 4% Liquid 1 Application(s) Topical <User Schedule>  epoetin katlyn-epbx (RETACRIT) Injectable 15215 Unit(s) IV Push <User Schedule>  heparin   Injectable 5000 Unit(s) SubCutaneous every 8 hours  polyethylene glycol 3350 17 Gram(s) Oral daily  senna 2 Tablet(s) Oral at bedtime  tiotropium 18 MICROgram(s) Capsule 1 Capsule(s) Inhalation daily      REVIEW OF SYSTEMS:  CONSTITUTIONAL: No weakness, fevers or chills  EYES/ENT: No visual changes;  No vertigo or throat pain   NECK: No pain or stiffness  RESPIRATORY: No cough, wheezing, hemoptysis; No shortness of breath  CARDIOVASCULAR: No chest pain or palpitations.  GASTROINTESTINAL: No abdominal or epigastric pain. No nausea, vomiting, or hematemesis; No diarrhea or constipation. No melena or hematochezia.  GENITOURINARY: No dysuria, frequency, foamy urine, urinary urgency, incontinence or hematuria  NEUROLOGICAL: No numbness or weakness  SKIN: No itching, burning, rashes, or lesions   VASCULAR: No bilateral lower extremity edema.   All other review of systems is negative unless indicated above.    VITALS:  T(F): 97.5 (11-25-20 @ 12:39), Max: 99 (11-24-20 @ 22:00)  HR: 90 (11-25-20 @ 12:39)  BP: 124/38 (11-25-20 @ 12:39)  RR: 18 (11-25-20 @ 12:39)  SpO2: 100% (11-25-20 @ 12:39)  Wt(kg): --    11-24 @ 07:01  -  11-25 @ 07:00  --------------------------------------------------------  IN: 1700 mL / OUT: 850 mL / NET: 850 mL          PHYSICAL EXAM:  Constitutional: NAD  HEENT: anicteric sclera, oropharynx clear  Neck: No JVD  Respiratory: CTAB, no wheezes, rales or rhonchi  Cardiovascular: S1, S2, RRR  Gastrointestinal: BS+, soft, NT/ND  Extremities: No cyanosis or clubbing. No peripheral edema  Neurological: A/O x 3, no focal deficits  Psychiatric: Normal mood, normal affect  : No CVA tenderness. No wheatley.   Skin: No rashes  Vascular Access:    LABS:  11-24    135  |  93<L>  |  56<H>  ----------------------------<  81  5.1   |  25  |  6.43<H>    Ca    9.4      24 Nov 2020 07:40  Phos  3.5     11-24  Mg     2.6     11-24      Creatinine Trend: 6.43 <--, 5.05 <--, 6.50 <--, 4.98 <--, 7.71 <--, 6.55 <--, 10.30 <--                        8.0    5.63  )-----------( 103      ( 25 Nov 2020 06:36 )             28.0     Urine Studies:        RADIOLOGY & ADDITIONAL STUDIES:   [facility] - S Monico / Mayi S /D Terra/ S Chanel/ S Gwendolyn/ Allan Ortiz / M Shaheen/ O Fang  service -[phone], office [phone]  ---------------------------------------------------------------------------------------------------------------    Patient seen and examined bedside    Subjective and Objective: No overnight events. denied sob. No new complaints today.    Allergies: No Known Allergies      Hospital Medications:   MEDICATIONS  (STANDING):  chlorhexidine 4% Liquid 1 Application(s) Topical <User Schedule>  epoetin katlyn-epbx (RETACRIT) Injectable 01379 Unit(s) IV Push <User Schedule>  heparin   Injectable 5000 Unit(s) SubCutaneous every 8 hours  polyethylene glycol 3350 17 Gram(s) Oral daily  senna 2 Tablet(s) Oral at bedtime  tiotropium 18 MICROgram(s) Capsule 1 Capsule(s) Inhalation daily    VITALS:  T(F): 97.5 (11-25-20 @ 12:39), Max: 99 (11-24-20 @ 22:00)  HR: 90 (11-25-20 @ 12:39)  BP: 124/38 (11-25-20 @ 12:39)  RR: 18 (11-25-20 @ 12:39)  SpO2: 100% (11-25-20 @ 12:39)  Wt(kg): --    11-24 @ 07:01  -  11-25 @ 07:00  --------------------------------------------------------  IN: 1700 mL / OUT: 850 mL / NET: 850 mL      PHYSICAL EXAM:  Neck: No JVD  Respiratory: CTAB, no wheezes, rales or rhonchi  Cardiovascular: S1, S2, RRR  Gastrointestinal: BS+, soft, NT/ND  Extremities: No peripheral edema  Neurological: A/O x 3  Psychiatric: Normal mood, normal affect  : No wheatley.   Vascular Access: AVF+thrill    LABS:  11-24    135  |  93<L>  |  56<H>  ----------------------------<  81  5.1   |  25  |  6.43<H>    Ca    9.4      24 Nov 2020 07:40  Phos  3.5     11-24  Mg     2.6     11-24      Creatinine Trend: 6.43 <--, 5.05 <--, 6.50 <--, 4.98 <--, 7.71 <--, 6.55 <--, 10.30 <--                        8.0    5.63  )-----------( 103      ( 25 Nov 2020 06:36 )             28.0     Urine Studies:        RADIOLOGY & ADDITIONAL STUDIES:

## 2020-11-25 NOTE — CONSULT NOTE ADULT - ASSESSMENT
75 year old man PMHx ESRD on HD (TTS), emphysema, renal cancer (diagnosed 3 months ago) on sunitinib, HTN, HLD presented to ED with sepsis and acute encephalopathy on 11/18, now resolved. Also with abd pain suspected due to mets.   Received zosyn x 5 days for possible superimposed kidney infection.   Planned for discharge when developed fever today to 100.4    Fever - Differential includes aspiration vs transient bacteremia from the abd vs tumor fever. He completed Zosyn initially x 5day from 11/18-23 and bl clx were negative at the time. Lungs sound clear but he has some throat congestion and endorses coughing when he eats. He is ESRD and does not make urine.     Recommend:   - Send repeat bl clx  - monitor off abx  - check CXR  - if develops recurrent fever or SOB, recommend to cover for Asp PNA with Zosyn q12, renally dosed  - monitor fever curve  - monitor cbc for leukocytosis      Sai Evans MD  Fellow, Infectious Diseases, PGY-4  Pager: [phone]  Before 9am or after 5pm/Weekends: Call [phone]   75 year old man PMHx ESRD on HD (TTS), emphysema, renal cancer (diagnosed 3 months ago) on sunitinib, HTN, HLD presented to ED with sepsis and acute encephalopathy on 11/18, now resolved. Also with abd pain suspected due to mets.   Received zosyn x 5 days for possible superimposed kidney infection.   Planned for discharge when developed fever today to 100.4    Fever - Differential includes aspiration vs transient bacteremia from the abd vs tumor fever. He completed Zosyn initially x 5day from 11/18-23 and bl clx were negative at the time. Lungs sound clear but he has some throat congestion and endorses coughing when he eats. He is ESRD and does not make urine.       Overall fever, tachycardia, hypotension, new cough. thrombocytopenia   Possible aspiration pneumonitis/pneumonia vs transient bacteremia from the abd vs tumor fever      Recommend:   - Send repeat bl clx, pt anuric   - monitor off abx for now,   - check CXR  - if develops recurrent fever or SOB, recommend to cover for Asp PNA with Zosyn q12, renally dosed  - monitor cbc for leukocytosis and thrombocytopenia       Sai Evans MD  Fellow, Infectious Diseases, PGY-4  Pager: [phone]  Before 9am or after 5pm/Weekends: Call [phone]

## 2020-11-25 NOTE — PROGRESS NOTE ADULT - SUBJECTIVE AND OBJECTIVE BOX
Patient is a 75y old  Male who presents with a chief complaint of AMS (24 Nov 2020 12:44)      SUBJECTIVE / OVERNIGHT EVENTS: no events     ROS:  No HA/DZ  No Vision changes   No CP, SOB  No N/V/D  No Edema  No Rash  NO weakness, numbness    MEDICATIONS  (STANDING):  chlorhexidine 4% Liquid 1 Application(s) Topical <User Schedule>  epoetin katlyn-epbx (RETACRIT) Injectable 73359 Unit(s) IV Push <User Schedule>  heparin   Injectable 5000 Unit(s) SubCutaneous every 8 hours  polyethylene glycol 3350 17 Gram(s) Oral daily  senna 2 Tablet(s) Oral at bedtime  tiotropium 18 MICROgram(s) Capsule 1 Capsule(s) Inhalation daily    MEDICATIONS  (PRN):  ALBUTerol    90 MICROgram(s) HFA Inhaler 2 Puff(s) Inhalation every 6 hours PRN Shortness of Breath and/or Wheezing  aluminum hydroxide/magnesium hydroxide/simethicone Suspension 30 milliLiter(s) Oral every 4 hours PRN Dyspepsia  oxyCODONE    IR 5 milliGRAM(s) Oral every 4 hours PRN Moderate to severe pain      T(C): 37 (11-25-20 @ 07:23)  HR: 91 (11-25-20 @ 07:23)  BP: 130/51 (11-25-20 @ 07:23)  RR: 16 (11-25-20 @ 07:23)  SpO2: 100% (11-25-20 @ 07:23)  CAPILLARY BLOOD GLUCOSE        I&O's Summary    24 Nov 2020 07:01  -  25 Nov 2020 07:00  --------------------------------------------------------  IN: 1700 mL / OUT: 850 mL / NET: 850 mL        PHYSICAL EXAM:  GENERAL: NAD, Thin   HEAD:  Atraumatic, Normocephalic  EYES: EOMI, PERRL, conjunctiva and sclera clear  NECK: Supple, No JVD  CHEST/LUNG: Clear to auscultation bilaterally  HEART: Regular rate and rhythm; No murmurs, rubs, or gallops, No Edema  ABDOMEN: Soft, Nontender, Nondistended; Bowel sounds present  EXTREMITIES:  2+ Peripheral Pulses, No clubbing, cyanosis  SKIN: AV     LABS:                        8.0    5.63  )-----------( 103      ( 25 Nov 2020 06:36 )             28.0     11-24    135  |  93<L>  |  56<H>  ----------------------------<  81  5.1   |  25  |  6.43<H>    Ca    9.4      24 Nov 2020 07:40  Phos  3.5     11-24  Mg     2.6     11-24                    RADIOLOGY & ADDITIONAL TESTS:    Imaging Personally Reviewed:    Consultant(s) Notes Reviewed:      Care Discussed with Consultants/Other Providers:

## 2020-11-25 NOTE — CHART NOTE - NSCHARTNOTEFT_GEN_A_CORE
Pt will not be discharged today .Pt Febrile to 38.0C , hypotensive 87/49 ,all other VSS. Started Normal saline 500cc bolus , Zosyn and Vancomycin by level . ordered UA , blood cx , CXR , consulted ID. will continue to monitor. eh aware.    Kady Jessica PA-C

## 2020-11-25 NOTE — PROGRESS NOTE ADULT - PROBLEM SELECTOR PLAN 8
DVT PPx: HSQ  Diet- renal replacement  Dispo: PT recommending home with home PT. D/w patient's sister Paolo, patient not compliant with medical follow up and does not seem able to care for himself at home. Palliative care team and CM following for dispo planning, now now planned for LTC placement as pt wishes to cw HD making hospice not an option.  stable for DC P LTC - time spent 35 min

## 2020-11-25 NOTE — DISCHARGE NOTE PROVIDER - NSDCMRMEDTOKEN_GEN_ALL_CORE_FT
aluminum hydroxide-magnesium hydroxide 200 mg-200 mg/5 mL oral suspension: 30 milliliter(s) orally every 4 hours, As needed, Dyspepsia  ammonium lactate 12% topical cream: Apply topically to affected area 2 times a day  aspirin 81 mg oral tablet, chewable: 1 tab(s) orally once a day  epoetin katlyn:   famotidine 20 mg oral tablet: 1 tab(s) orally once a day  gabapentin 100 mg oral capsule: 1 cap(s) orally 2 times a day  oxyCODONE 5 mg oral tablet: 1 tab(s) orally every 4 hours, As needed, Moderate to severe pain  polyethylene glycol 3350 oral powder for reconstitution: 17 gram(s) orally once a day  senna oral tablet: 2 tab(s) orally once a day (at bedtime)  Spiriva 18 mcg inhalation capsule: 1 cap(s) inhaled once a day  Ventolin HFA 90 mcg/inh inhalation aerosol: 2 puff(s) inhaled every 6 hours, As Needed  Vitamin D2 2000 intl units (50 mcg) oral capsule: 1 cap(s) orally once a day   aluminum hydroxide-magnesium hydroxide 200 mg-200 mg/5 mL oral suspension: 30 milliliter(s) orally every 4 hours, As needed, Dyspepsia  ammonium lactate 12% topical cream: Apply topically to affected area 2 times a day  aspirin 81 mg oral tablet, chewable: 1 tab(s) orally once a day  epoetin katlyn: 31277 unit(s) intravenous 3 times a week intra-dialysis T/T/S   famotidine 20 mg oral tablet: 1 tab(s) orally once a day  gabapentin 100 mg oral capsule: 1 cap(s) orally 2 times a day  oxyCODONE 5 mg oral tablet: 1 tab(s) orally every 6 hours, As Needed Moderate-Severe pain  Spiriva 18 mcg inhalation capsule: 1 cap(s) inhaled once a day  Ventolin HFA 90 mcg/inh inhalation aerosol: 2 puff(s) inhaled every 6 hours, As Needed  Vitamin D2 2000 intl units (50 mcg) oral capsule: 1 cap(s) orally once a day   aluminum hydroxide-magnesium hydroxide 200 mg-200 mg/5 mL oral suspension: 30 milliliter(s) orally every 4 hours, As needed, Dyspepsia  ammonium lactate 12% topical cream: Apply topically to affected area 2 times a day  epoetin katlyn: 24446 unit(s) intravenous 3 times a week intra-dialysis T/T/S   famotidine 20 mg oral tablet: 1 tab(s) orally once a day  oxyCODONE 5 mg oral tablet: 1 tab(s) orally every 6 hours, As Needed Moderate-Severe pain  Spiriva 18 mcg inhalation capsule: 1 cap(s) inhaled once a day  Ventolin HFA 90 mcg/inh inhalation aerosol: 2 puff(s) inhaled every 6 hours, As Needed  Vitamin D2 2000 intl units (50 mcg) oral capsule: 1 cap(s) orally once a day

## 2020-11-25 NOTE — DISCHARGE NOTE PROVIDER - NSFOLLOWUPCLINICS_GEN_ALL_ED_FT
[facility]  Geriatrics  [address]  [place] [address]  Phone: [phone]  Fax:   Follow Up Time:

## 2020-11-25 NOTE — CONSULT NOTE ADULT - ATTENDING COMMENTS
[facility]  Office [phone]  Ans Serv [phone]  [facility] - [phone].
King Ibarra  Pager: [phone]. If no response or past 5 pm call [phone].     My colleague will cover 11/26

## 2020-11-25 NOTE — DISCHARGE NOTE PROVIDER - HOSPITAL COURSE
· Assessment    75 M PMHx ESRD on HD (TTS), emphysema, renal cancer (diagnosed 3 months ago) on sunitinib, HTN, HLD presented to ED with metabolic encephalopathy d/t sepsis possibly from necrotic lymph node .    #SIRS (systemic inflammatory response syndrome).  -pt present septic m/l secondary to presumed infected necrotic lymph seen on CT scan , lactate 8 on admission down trended to 1.7  -pt treated with Zosyn x 5 days , blood cultures NGTD     #Abdominal pain  - M/l from from Sunitinib medication (25-39% incidence per UTD) vs IVC compression with necrotic lymph node or tumor invasion, pt w/o peritoneal signs   - pain controlled with Oxycodone 5mg PO q4h PRN for pain, consulted palliative care   - US b/l LE venous dopplers negative for DVT.     #Encephalopathy  -m/l Likely metabolic in the setting of infection, now resolved, appears to be at baseline  -Noted to have tremor of b/l upper extremities, ammonia level normal    #Renal cell carcinoma of right kidney  -per pts oncologist Dr. Castillo: pt prescribed Sutinib in July 2020 ,imaging c/w liver mets , pr oncologist palliative care is appropriate , unclear if this is POD on current chemo vs questionable pt compliance w/ delay in starting treatment since initial scan s  -Previous imaging showed sclerotic lesion in T8, with possible other bony metastases  -DCd MR spine and head as with ESRD and need for Riley - dw renal, would need 3 sessions of HD to mitigate risk of NSF and then there is still possibility of adverse outcome and unsure if pt can tolerate back to back HD sessions with clinical status  - considering no plans for further DMT, MR imaging would not  - can be considered as out-pt as for staging purposes if GOC change   -Sutinib held in setting of infection  -Patient declined further chemotherapy       # ESRD (end stage renal disease) on HD   - Nephrology consulted  - unable to have disposition to hospice due to patient's wish to continue HD.     #HTN  -held nifedipine in setting of sepsis      #Anemia  -Hgb 8.4-> 7.2. Likely anemia of chronic disease given renal failure and malignancy vs blood loss. Noted recent bleeding from penis, s/p cystoscopy with no source. No active bleeding noted  - s/p  1u PRBCs with HD on 11/24  - Folate and Vitamin B12 levels WNL.     # DVT PPx:   -started on HSQ    Dispo: Hd with Long term Care     Patient seen and evaluated. Reviewed discharge medications with patient and attending. All new medications requiring new prescriptions were sent to the pharmacy of patient's choice. Reviewed need for prescription for previous home medications and new prescriptions sent if requested. Medically cleared/stable for discharge as per  with appropriate follow up. Patient understands and agrees with plan of care.          75 M PMHx ESRD on HD (TTS), emphysema, renal cancer with liver mets (diagnosed 5/2020) on Sunitinib, HTN, HLD presented to ED with metabolic encephalopathy d/t sepsis possibly from necrotic lymph node. On admission, T 101.5, , lactate 8. S/P Zosyn x5 days. Lactate improved 1.7. Hospital course c/b recurrent fever. CXR with LLL infiltrate. Pt monitored off Abx as per ID. CT chest done with small pleural effusion/atelectasis. BCx negative. Speech consulted, recommend ___________________ Pt monitored and remained hemodynamically stable with improvement in mentation.     Noted with abdominal pain. Given time course with initiation of Sunitinib, may be 2/2 medication (25-39% incidence per UTD) or possibly from IVC compression with necrotic lymph node or tumor invasion. Continue Oxycodone PRN. US Doppler LE negative DVT. Improved.     Collateral obtained from pt hematologist, Dr. Castillo. Pt was prescribed Sutinib in July 2020, which he had trouble obtaining due to its cost - had poor follow up and was not seen again in the office until September when cost was addressed - did not start Sutinib until October 11th. Previous imaging showed sclerotic lesion in T8, with possible other bony metastases - an MRI of the spine was ordered, which pt did not get done. Pt 1 month ago complained of worsening dizziness, MRI head was ordered to R/O metastatic disease, which patient did not get done. Per Dr. Castillo, palliative care is appropriate, although it is hard to say if this is progression of disease on current chemotherapy given questionable pt compliance and delay in starting treatment since initial scans were done.    MR spine and head cancelled as with ESRD and need for gadolinium. As per Nephro, would need 3 sessions of HD to mitigate risk of NSF and then there is still possibility of adverse outcome and unsure if pt can tolerate back to back HD sessions with clinical status. Considering no plans for further DMT, MR imaging would not  - can be considered as out-pt as for staging purposes if GOC change. Hold Sutinib in setting of infection, pt declined further chemo.     Noted with Follow-up with your outpatient provider if further treatment is warranted. Monitor for signs/symptoms indicating worsening of disease, such as, easy bleeding/bruising, pale skin, fatigue, dizziness, increased heart rate, or chest pain.         Anemia.  Plan: Likely anemia of chronic disease given renal failure and malignancy. No active bleeding noted      #SIRS (systemic inflammatory response syndrome).  -pt present septic m/l secondary to presumed infected necrotic lymph seen on CT scan , lactate 8 on admission down trended to 1.7  -pt treated with Zosyn x 5 days , blood cultures NGTD     #Abdominal pain  - M/l from from Sunitinib medication (25-39% incidence per UTD) vs IVC compression with necrotic lymph node or tumor invasion, pt w/o peritoneal signs   - pain controlled with Oxycodone 5mg PO q4h PRN for pain, consulted palliative care   - US b/l LE venous dopplers negative for DVT.     #Encephalopathy  -m/l Likely metabolic in the setting of infection, now resolved, appears to be at baseline  -Noted to have tremor of b/l upper extremities, ammonia level normal    #Renal cell carcinoma of right kidney  -per pts oncologist Dr. Castillo: pt prescribed Sutinib in July 2020 ,imaging c/w liver mets , pr oncologist palliative care is appropriate , unclear if this is POD on current chemo vs questionable pt compliance w/ delay in starting treatment since initial scan s  -Previous imaging showed sclerotic lesion in T8, with possible other bony metastases  -DCd MR spine and head as with ESRD and need for Riley - dw renal, would need 3 sessions of HD to mitigate risk of NSF and then there is still possibility of adverse outcome and unsure if pt can tolerate back to back HD sessions with clinical status  - considering no plans for further DMT, MR imaging would not  - can be considered as out-pt as for staging purposes if GOC change   -Sutinib held in setting of infection  -Patient declined further chemotherapy       # ESRD (end stage renal disease) on HD   - Nephrology consulted  - unable to have disposition to hospice due to patient's wish to continue HD.     #HTN  -held nifedipine in setting of sepsis      #Anemia  -Hgb 8.4-> 7.2. Likely anemia of chronic disease given renal failure and malignancy vs blood loss. Noted recent bleeding from penis, s/p cystoscopy with no source. No active bleeding noted  - s/p  1u PRBCs with HD on 11/24  - Folate and Vitamin B12 levels WNL.     Dispo: Hd with Long term Care     Patient seen and evaluated. Reviewed discharge medications with patient and attending. All new medications requiring new prescriptions were sent to the pharmacy of patient's choice. Reviewed need for prescription for previous home medications and new prescriptions sent if requested. Medically cleared/stable for discharge as per  with appropriate follow up. Patient understands and agrees with plan of care.     o   75 M PMHx ESRD on HD T/T/S, emphysema, renal cancer (on Sunitinib), HTN, HLD p/w sepsis, AMS. CT A/P with Rt upper pole renal mass, possible tumoral invasion into Rt renal vein, large retrocaval mass with mass effect with narrowing of IVC without distinct fat plane, possibly necrotic lymph node. Liver mets. Doppler LE negative DVT. Admitted for metabolic encephalopathy 2/2 sepsis possibly from necrotic lymph node. ID consulted. S/P Zosyn x5 days. BCx negative. Lactate improved from 8 to 1.7. No peritoneal signs on exam. Mentation improved. Noted with new fever after completion of antibiotics. Repeat CXR with LLL infiltrate. Pt w/o respiratory complaints. Speech & swallow consulted, recommend dysphagia 2, mechanical soft with nectar. Pt monitored off antibiotics as per ID/attending. No fevers, leukocytosis noted. CT chest done w/o consolidation. Pt monitored and remained hemodynamically stable.     Collateral obtained from hematologist, Dr. Castillo. Pt was first diagnosed with non-clear cell renal carcinoma of the R kidney in May 2020. He had CT imaging consistent with liver metastases at the time. Pt was prescribed Sutinib in July 2020, which he had trouble obtaining due to its cost - had poor follow up and was not seen again in the office until September when cost was addressed - did not start Sutinib until October 11th. Previous imaging showed sclerotic lesion in T8, with possible other bony metastases - an MRI of the spine was ordered, which patient did not get done. Pt 1 month ago complained of worsening dizziness, MRI head was ordered to R/O metastatic disease, which patient did not get done. Per Dr. Castillo, palliative care is appropriate, although it is hard to say if this is progression of disease on current chemotherapy given questionable patient compliance and delay in starting treatment since initial scans were done. Palliative consulted.     Labs noted with Anemia. Likely AOCD given renal failure and malignancy. No active bleeding noted. Folate/B12 WNL. Continue Epogen. S/P 2 U PRBC with improvement (last 12/1).     Noted with abdominal pain. Given time course with initiation of Sunitinib, may be 2/2 medication (25-39% incidence per UTD) or possibly from IVC compression with necrotic lymph node or tumor invasion. Pt continued on Oxycodone PRN with improvement in pain.     Hospital course c/b B/L upper extremity tremors. NH3 WNL. Resolved.     Spoke with attending, Dr. Mckeon, pt is medically stable for discharge to LTC          Problem/Plan - 8:  ·  Problem: Need for prophylactic measure.  Plan: DVT PPx: HSQ  Diet- renal replacement - change to dysphagia P swallow eval   Dispo: PT recommending home with home PT. D/w patient's sister Paolo on 11/30, patient not compliant with medical follow up and does not seem able to care for himself at home. Palliative care team and CM following for dispo planning, now now planned for LTC placement as pt wishes to cw HD making hospice not an option.      75 M PMHx ESRD on HD T/T/S, emphysema, renal cancer (on Sunitinib), HTN, HLD p/w sepsis, AMS. CT A/P with Rt upper pole renal mass, possible tumoral invasion into Rt renal vein, large retrocaval mass with mass effect with narrowing of IVC without distinct fat plane, possibly necrotic lymph node. Liver mets. Doppler LE negative DVT. Admitted for metabolic encephalopathy 2/2 sepsis possibly from necrotic lymph node. ID consulted. S/P Zosyn x5 days. BCx negative. Lactate improved from 8 to 1.7. No peritoneal signs on exam. Mentation improved. Noted with new fever after completion of antibiotics. Repeat CXR with LLL infiltrate. Pt w/o respiratory complaints. Speech & swallow consulted, recommend dysphagia 2, mechanical soft with nectar. Pt monitored off antibiotics as per ID/attending. No fevers, leukocytosis noted. CT chest done w/o consolidation. Pt monitored and remained hemodynamically stable.     Collateral obtained from hematologist, Dr. Castillo. Pt was first diagnosed with non-clear cell renal carcinoma of the R kidney in May 2020. He had CT imaging consistent with liver metastases at the time. Pt was prescribed Sutinib in July 2020, which he had trouble obtaining due to its cost - had poor follow up and was not seen again in the office until September when cost was addressed - did not start Sutinib until October 11th. Previous imaging showed sclerotic lesion in T8, with possible other bony metastases - an MRI of the spine was ordered, which patient did not get done. Pt 1 month ago complained of worsening dizziness, MRI head was ordered to R/O metastatic disease, which patient did not get done. Per Dr. Castillo, palliative care is appropriate, although it is hard to say if this is progression of disease on current chemotherapy given questionable patient compliance and delay in starting treatment since initial scans were done. Palliative consulted.     Labs noted with Anemia. Likely AOCD given renal failure and malignancy. No active bleeding noted. Folate/B12 WNL. Continue Epogen. S/P 2 U PRBC with improvement (last 12/1).     Noted with abdominal pain. Given time course with initiation of Sunitinib, may be 2/2 medication (25-39% incidence per UTD) or possibly from IVC compression with necrotic lymph node or tumor invasion. Pt continued on Oxycodone PRN with improvement in pain.     Hospital course c/b B/L upper extremity tremors. NH3 WNL. Resolved.     Spoke with attending, Dr. Mckeon, pt is medically stable for discharge to LTC        75 M PMHx ESRD on HD T/T/S, emphysema, renal cancer (on Sunitinib), HTN, HLD p/w sepsis, AMS. CT A/P with Rt upper pole renal mass, possible tumoral invasion into Rt renal vein, large retrocaval mass with mass effect with narrowing of IVC without distinct fat plane, possibly necrotic lymph node. Liver mets. Doppler LE negative DVT. Admitted for metabolic encephalopathy 2/2 sepsis possibly from necrotic lymph node. ID consulted. S/P Zosyn x5 days. BCx negative. Lactate improved from 8 to 1.7. No peritoneal signs on exam. Mentation improved. Noted with new fever after completion of antibiotics. Repeat CXR with LLL infiltrate. Pt w/o respiratory complaints. Speech & swallow consulted, recommend dysphagia 2, mechanical soft with nectar. Pt monitored off antibiotics as per ID/attending. No fevers, leukocytosis noted. CT chest done w/o consolidation. Pt monitored and remained hemodynamically stable.     Collateral obtained from hematologist, Dr. Castillo. Pt was first diagnosed with non-clear cell renal carcinoma of the R kidney in May 2020. He had CT imaging consistent with liver metastases at the time. Pt was prescribed Sutinib in July 2020, which he had trouble obtaining due to its cost - had poor follow up and was not seen again in the office until September when cost was addressed - did not start Sutinib until October 11th. Previous imaging showed sclerotic lesion in T8, with possible other bony metastases - an MRI of the spine was ordered, which patient did not get done. Pt 1 month ago complained of worsening dizziness, MRI head was ordered to R/O metastatic disease, which patient did not get done. Per Dr. Castillo, palliative care is appropriate, although it is hard to say if this is progression of disease on current chemotherapy given questionable patient compliance and delay in starting treatment since initial scans were done. Palliative consulted.     Labs noted with anemia. Likely AOCD given renal failure and malignancy. No active bleeding noted. Folate/B12 WNL. Continue Epogen. S/P 2 U PRBC with improvement (last 12/1).     Noted with abdominal pain. Given time course with initiation of Sunitinib, may be 2/2 medication (25-39% incidence per UTD) or possibly from IVC compression with necrotic lymph node or tumor invasion. Pt continued on Oxycodone PRN with improvement in pain.     Hospital course c/b B/L upper extremity tremors. NH3 WNL. Resolved.     Spoke with attending, Dr. Mckeon, pt is medically stable for discharge to LTC

## 2020-11-25 NOTE — PROGRESS NOTE ADULT - ASSESSMENT
75M PMHx ESRD on HD (TTS), emphysema, renal cancer (diagnosed 3 months ago) on sunitinib, HTN, HLD presented to ED with sepsis and AMS.  Renal following for ESRD Mx.     ESRD (end stage renal disease) on dialysis.   Maintenance HD schedule TTS, currently off schedule.- tolerating hemodialysis well, blood pressure lower begening of hemodialysis but not stable, Ultrafiltration on Dialysis as tolerated  Electrolytes and volume status acceptable.               s/p HD yesterday, Rx sheet reviewed, tolerated well. uneventful.  (holiday schedule)              plan for next routine HD SAT  renal restrictions in diet  dose all meds for ESRD  Discussed with pt regarding pts wishes with HD and GOC, should his condition further deteriorates from metastatic cancer.   Explained to pt, that it would be appropriate to stop HD, should he becomes too weak to tolerate it. Pt understands his health is declining, however he says he is not ready to stop HD at this time. f/u w/Palliative eval    HTN, bp well controlled. stable. not on meds  Anemia in CKD- Hg <goal  AMS -resolved. pt at baseline now  labs, chart reviewed

## 2020-11-25 NOTE — DISCHARGE NOTE PROVIDER - NSDCCPCAREPLAN_GEN_ALL_CORE_FT
PRINCIPAL DISCHARGE DIAGNOSIS  Diagnosis: Abdominal pain  Assessment and Plan of Treatment: You came into the emergency department with abdominal pain and change in mental status. Imaging your stomach was done and it shoed a necrotic lymph node that could have been the source of your infection. You were treated with antibiotics for 5 days .      SECONDARY DISCHARGE DIAGNOSES  Diagnosis: Anemia  Assessment and Plan of Treatment: You were given a blood transfusion for low hemoglobin, this is most likely from your kidney disease. You hemoglobin is now back at your baseline.    Diagnosis: ESRD (end stage renal disease)  Assessment and Plan of Treatment: Please continue to follow your dialysis schedule and refer to your primary provider/nephrologist for further care and monitoring of kidney function and electrolytes. Continue a renal restricted diet (Avoiding foods high in potassium and phosphorus), your prescribed medications, and supplementations as directed. (end stage renal disease)    Diagnosis: Renal cell carcinoma of right kidney  Assessment and Plan of Treatment: You were on a medication Sutinib , that was held in the setting of your infection. This medication may have also caused your abdominal pain. You no longer want to received treatment . Please follow up with your Oncologist  for further management.    Diagnosis: Weakness generalized  Assessment and Plan of Treatment:      PRINCIPAL DISCHARGE DIAGNOSIS  Diagnosis: Abdominal pain  Assessment and Plan of Treatment: You came into the emergency department with abdominal pain and change in mental status. Imaging your stomach was done and it showed a necrotic lymph node that could have been the source of your infection. You were treated with antibiotics for 5 days. Resolved. Follow up PCP in 1-2 weeks for further management.      SECONDARY DISCHARGE DIAGNOSES  Diagnosis: Renal cell carcinoma of right kidney  Assessment and Plan of Treatment: You were on a medication Sutinib, that was held in the setting of your infection. This medication may have also caused your abdominal pain. You no longer want to received treatment . Please follow up with your Oncologist  for further management    Diagnosis: ESRD (end stage renal disease)  Assessment and Plan of Treatment: Please continue to follow your dialysis schedule and refer to your primary provider/nephrologist for further care and monitoring of kidney function and electrolytes. Continue a renal restricted diet (Avoiding foods high in potassium and phosphorus), your prescribed medications, and supplementations as directed. (end stage renal disease)    Diagnosis: HTN (hypertension)  Assessment and Plan of Treatment: Stable off blood pressure medications. Monitor for any visual changes, headaches or dizziness.  Monitor blood pressure regularly.  Follow up with your PCP for further management for high blood pressure.      Diagnosis: Anemia  Assessment and Plan of Treatment: You were given a blood transfusion for low hemoglobin, this is most likely from your kidney disease. You hemoglobin is now back at your baseline. Follow-up with your outpatient provider if further treatment is warranted. Monitor for signs/symptoms indicating worsening of disease, such as, easy bleeding/bruising, pale skin, fatigue, dizziness, increased heart rate, or chest pain.

## 2020-11-25 NOTE — DISCHARGE NOTE NURSING/CASE MANAGEMENT/SOCIAL WORK - PATIENT PORTAL LINK FT
You can access the FollowMyHealth Patient Portal offered by [facility] by registering at the following website: [email or web address]. By joining [facility]’s FollowMyHealth portal, you will also be able to view your health information using other applications (apps) compatible with our system.

## 2020-11-25 NOTE — CONSULT NOTE ADULT - SUBJECTIVE AND OBJECTIVE BOX
Patient is a 75y old  Male who presents with a chief complaint of AMS (25 Nov 2020 13:46)    HPI:  75 year old man PMHx ESRD on HD (TTS), emphysema, renal cancer (diagnosed 3 months ago) on sunitinib, HTN, HLD presented to ED with sepsis and AMS.     In ED, T 101.5, , /84, SpO2 100% on RA. Hyperkalemic to 6.1 (hemolyzed), elevated troponin 187-> 160, BUN/SCr 70/9.92, CT A/P shows R upper pole renal mass, possible tumoral invasion into R renal vein, large right retrocaval mass with mass effect with narrowing of IVC without distinct fat plane, possibly necrotic lymph node. Also noted liver metastasis.    Pt admitted due to acute encephalopathy with Sepsis concerning for infected necrotic LN. He received Zosyn, 11/17-11/23. Encephalopathy resolved. Also treated for abd pain with narcotics, attributed to prior med, sanitunib vs IVC compression with mets.    Now today with fever to 100.4. ID called for fever.    Pt reports chronic abd pain. AOx3. Denies any nausea, vomiting, diarrhea, dysuria. Was planning to go to rehab.     Per charts, pt declined any further chemo for RCC.     prior hospital charts reviewed [  ]  primary team notes reviewed [x  ]  other consultant notes reviewed [ x ]    PAST MEDICAL & SURGICAL HISTORY:  Renal cancer    Enlarged prostate without lower urinary tract symptoms (luts)    Emphysema/COPD    Diverticulitis of colon    Aortic valve disorder    AV fistula infection, sequela    Arterial insufficiency of lower extremity  RLE    Dizziness    HLD (hyperlipidemia)    Tobacco abuse  Quit in 2005, Smoked 2.5 PPD for 15 years    COPD (chronic obstructive pulmonary disease)    ESRD (end stage renal disease)    BPH (benign prostatic hyperplasia)    PE (pulmonary embolism)    History of smoking 2 pac for 25 yrs , quit 7 yrs ago    Diverticulitis of colon    HTN (hypertension)    H/O unilateral nephrectomy  9/2013    S/P kidney transplant  09/2013 in [place]    h/o AV fistula - left upper arm    History of colon resection - April 2012    h/o  LUE A-V fistula      Allergies  No Known Allergies    ANTIMICROBIALS (past 90 days)  MEDICATIONS  (STANDING):  piperacillin/tazobactam IVPB.   25 mL/Hr IV Intermittent (11-23-20 @ 17:46)    piperacillin/tazobactam IVPB..   25 mL/Hr IV Intermittent (11-23-20 @ 06:20)   25 mL/Hr IV Intermittent (11-22-20 @ 17:44)   25 mL/Hr IV Intermittent (11-22-20 @ 05:48)   25 mL/Hr IV Intermittent (11-21-20 @ 17:25)   25 mL/Hr IV Intermittent (11-21-20 @ 06:18)   25 mL/Hr IV Intermittent (11-20-20 @ 21:58)   25 mL/Hr IV Intermittent (11-20-20 @ 05:38)   25 mL/Hr IV Intermittent (11-19-20 @ 17:39)   25 mL/Hr IV Intermittent (11-19-20 @ 06:30)   25 mL/Hr IV Intermittent (11-18-20 @ 18:04)   200 mL/Hr IV Intermittent (11-17-20 @ 19:28)      ANTIMICROBIALS:    piperacillin/tazobactam IVPB.. 3.375 every 12 hours  vancomycin  IVPB 1000 once    OTHER MEDS: MEDICATIONS  (STANDING):  acetaminophen    Suspension .. 650 once  ALBUTerol    90 MICROgram(s) HFA Inhaler 2 every 6 hours PRN  aluminum hydroxide/magnesium hydroxide/simethicone Suspension 30 every 4 hours PRN  epoetin katlyn-epbx (RETACRIT) Injectable 90004 <User Schedule>  heparin   Injectable 5000 every 8 hours  oxyCODONE    IR 5 every 4 hours PRN  polyethylene glycol 3350 17 daily  senna 2 at bedtime  tiotropium 18 MICROgram(s) Capsule 1 daily    SOCIAL HISTORY:   Lives alone  Has not had alcohol in a year.  Former smoker x15 years 2 ppd quit 10 years ago.  Denies illicit drug use.       FAMILY HISTORY:  FH: hypertension      REVIEW OF SYSTEMS  [  ] ROS unobtainable because:    [ x ] All other systems negative except as noted below:	    Constitutional:  [x ] fever [ ] chills  [ ] weight loss  [ ] weakness  Skin:  [ ] rash [ ] phlebitis	  Eyes: [ ] icterus [ ] pain  [ ] discharge	  ENMT: [ ] sore throat  [ ] thrush [ ] ulcers [ ] exudates  Respiratory: [ ] dyspnea [ ] hemoptysis [ ] cough [ ] sputum	  Cardiovascular:  [ ] chest pain [ ] palpitations [ ] edema	  Gastrointestinal:  [ ] nausea [ ] vomiting [ ] diarrhea [ ] constipation [ ] pain	  Genitourinary:  [ ] dysuria [ ] frequency [ ] hematuria [ ] discharge [ ] flank pain  [ ] incontinence  Musculoskeletal:  [ ] myalgias [ ] arthralgias [ ] arthritis  [ ] back pain  Neurological:  [ ] headache [ ] seizures  [ ] confusion/altered mental status  Psychiatric:  [ ] anxiety [ ] depression	  Hematology/Lymphatics:  [ ] lymphadenopathy  Endocrine:  [ ] adrenal [ ] thyroid  Allergic/Immunologic:	 [ ] transplant [ ] seasonal    Vital Signs Last 24 Hrs  T(F): 97.5 (11-25-20 @ 12:39), Max: 99 (11-20-20 @ 21:15)  Vital Signs Last 24 Hrs  HR: 90 (11-25-20 @ 12:39) (90 - 110)  BP: 124/38 (11-25-20 @ 12:39) (92/60 - 163/59)  RR: 18 (11-25-20 @ 12:39)  SpO2: 100% (11-25-20 @ 12:39) (100% - 100%)  Wt(kg): --    EXAM:  Constitutional: Not in acute distress  Eyes: pupils bilaterally reactive to light. No icterus.  Oral cavity: poor dentition, no lesions  Neck: No neck vein distension noted  RS: Chest clear to auscultation bilaterally. No wheeze/rhonchi/crepitations.  CVS: S1, S2 heard. Regular rate and rhythm. No murmurs/rubs/gallops.  Abdomen: Soft. tender to deep palpation in periumbilical region  : No acute abnormalities  Extremities: Warm. No pedal edema  Skin: No lesions noted  Vascular: No evidence of phlebitis; R arm AVF  Neuro: Alert, oriented to time/place/person                          8.0    5.63  )-----------( 103      ( 25 Nov 2020 06:36 )             28.0     11-24    135  |  93<L>  |  56<H>  ----------------------------<  81  5.1   |  25  |  6.43<H>    Ca    9.4      24 Nov 2020 07:40  Phos  3.5     11-24  Mg     2.6     11-24      MICROBIOLOGY:  Culture - Blood (11.17.20 @ 19:07)    Specimen Source: .Blood Blood-Peripheral    Culture Results:   No Growth Final    COVID-19 PCR: NotDetec (11-24-20 @ 21:09)    RADIOLOGY:  imaging below personally reviewed    < from: CT Abdomen and Pelvis w/ IV Cont (11.17.20 @ 20:03) >  IMPRESSION:  Heterogeneous right upper pole renal mass most likely representing a renal cell carcinoma. Heterogeneous enlargement of the right kidney from 3/8/2017, may represent infiltrating disease with superimposed infection not entirely excluded. Suspect tumoral invasion of the right renal vein, however, limitedly assessed. Mass effect with narrowing of the IVC without distinct fat plane. Contrast enhanced MR may be obtained on a nonemergent basis for more detailed evaluation if there are no contraindications.  Large right retrocaval mass, as above, possibly representing necrotic lymph node with mass effect on the IVC.  Liver metastasis.       Patient is a 75y old  Male who presents with a chief complaint of AMS (25 Nov 2020 13:46)    HPI:  75 year old man PMHx ESRD on HD (TTS), emphysema, renal cancer (diagnosed 3 months ago) on sunitinib, HTN, HLD presented to ED with sepsis and AMS on 11/18.     In ED, T 101.5, , /84, SpO2 100% on RA. Hyperkalemic to 6.1 (hemolyzed), elevated troponin 187-> 160, BUN/SCr 70/9.92, CT A/P shows R upper pole renal mass, possible tumoral invasion into R renal vein, large right retrocaval mass with mass effect with narrowing of IVC without distinct fat plane, possibly necrotic lymph node. Also noted liver metastasis.    Pt admitted due to acute encephalopathy with Sepsis concerning for infected necrotic LN. He received Zosyn, 11/17-11/23. Encephalopathy resolved. Also treated for abd pain with narcotics, attributed to prior med, sanitunib vs IVC compression with mets.    Now today with fever to 100.4. ID called for fever.    Pt reports chronic abd pain. AOx3. Denies any nausea, vomiting, diarrhea, dysuria. Was planning to go to rehab.     Per charts, pt declined any further chemo for RCC.     prior hospital charts reviewed [  ]  primary team notes reviewed [x  ]  other consultant notes reviewed [ x ]    PAST MEDICAL & SURGICAL HISTORY:  Renal cancer    Enlarged prostate without lower urinary tract symptoms (luts)    Emphysema/COPD    Diverticulitis of colon    Aortic valve disorder    AV fistula infection, sequela    Arterial insufficiency of lower extremity  RLE    Dizziness    HLD (hyperlipidemia)    Tobacco abuse  Quit in 2005, Smoked 2.5 PPD for 15 years    COPD (chronic obstructive pulmonary disease)    ESRD (end stage renal disease)    BPH (benign prostatic hyperplasia)    PE (pulmonary embolism)    History of smoking 2 pac for 25 yrs , quit 7 yrs ago    Diverticulitis of colon    HTN (hypertension)    H/O unilateral nephrectomy  9/2013    S/P kidney transplant  09/2013 in [place]    h/o AV fistula - left upper arm    History of colon resection - April 2012    h/o  LUE A-V fistula      Allergies  No Known Allergies    ANTIMICROBIALS (past 90 days)  MEDICATIONS  (STANDING):  piperacillin/tazobactam IVPB.   25 mL/Hr IV Intermittent (11-23-20 @ 17:46)    piperacillin/tazobactam IVPB..   25 mL/Hr IV Intermittent (11-23-20 @ 06:20)   25 mL/Hr IV Intermittent (11-22-20 @ 17:44)   25 mL/Hr IV Intermittent (11-22-20 @ 05:48)   25 mL/Hr IV Intermittent (11-21-20 @ 17:25)   25 mL/Hr IV Intermittent (11-21-20 @ 06:18)   25 mL/Hr IV Intermittent (11-20-20 @ 21:58)   25 mL/Hr IV Intermittent (11-20-20 @ 05:38)   25 mL/Hr IV Intermittent (11-19-20 @ 17:39)   25 mL/Hr IV Intermittent (11-19-20 @ 06:30)   25 mL/Hr IV Intermittent (11-18-20 @ 18:04)   200 mL/Hr IV Intermittent (11-17-20 @ 19:28)      ANTIMICROBIALS:    piperacillin/tazobactam IVPB.. 3.375 every 12 hours  vancomycin  IVPB 1000 once    OTHER MEDS: MEDICATIONS  (STANDING):  acetaminophen    Suspension .. 650 once  ALBUTerol    90 MICROgram(s) HFA Inhaler 2 every 6 hours PRN  aluminum hydroxide/magnesium hydroxide/simethicone Suspension 30 every 4 hours PRN  epoetin katlyn-epbx (RETACRIT) Injectable 69450 <User Schedule>  heparin   Injectable 5000 every 8 hours  oxyCODONE    IR 5 every 4 hours PRN  polyethylene glycol 3350 17 daily  senna 2 at bedtime  tiotropium 18 MICROgram(s) Capsule 1 daily    SOCIAL HISTORY:   Lives alone  Has not had alcohol in a year.  Former smoker x15 years 2 ppd quit 10 years ago.  Denies illicit drug use.       FAMILY HISTORY:  FH: hypertension      REVIEW OF SYSTEMS  [  ] ROS unobtainable because:    [ x ] All other systems negative except as noted below:	    Constitutional:  [x ] fever [ ] chills  [ ] weight loss  [ ] weakness  Skin:  [ ] rash [ ] phlebitis	  Eyes: [ ] icterus [ ] pain  [ ] discharge	  ENMT: [ ] sore throat  [ ] thrush [ ] ulcers [ ] exudates  Respiratory: [ ] dyspnea [ ] hemoptysis [ ] cough [ ] sputum	  Cardiovascular:  [ ] chest pain [ ] palpitations [ ] edema	  Gastrointestinal:  [ ] nausea [ ] vomiting [ ] diarrhea [ ] constipation [ ] pain	  Genitourinary:  [ ] dysuria [ ] frequency [ ] hematuria [ ] discharge [ ] flank pain  [ ] incontinence  Musculoskeletal:  [ ] myalgias [ ] arthralgias [ ] arthritis  [ ] back pain  Neurological:  [ ] headache [ ] seizures  [ ] confusion/altered mental status  Psychiatric:  [ ] anxiety [ ] depression	  Hematology/Lymphatics:  [ ] lymphadenopathy  Endocrine:  [ ] adrenal [ ] thyroid  Allergic/Immunologic:	 [ ] transplant [ ] seasonal    Vital Signs Last 24 Hrs  T(F): 97.5 (11-25-20 @ 12:39), Max: 99 (11-20-20 @ 21:15)  Vital Signs Last 24 Hrs  HR: 90 (11-25-20 @ 12:39) (90 - 110)  BP: 124/38 (11-25-20 @ 12:39) (92/60 - 163/59)  RR: 18 (11-25-20 @ 12:39)  SpO2: 100% (11-25-20 @ 12:39) (100% - 100%)  Wt(kg): --    EXAM:  Constitutional: Not in acute distress  Eyes: pupils bilaterally reactive to light. No icterus.  Oral cavity: poor dentition, no lesions  Neck: No neck vein distension noted  RS: clear but diminished sounds bilaterally. No wheeze/rhonchi/crepitations.  CVS: S1, S2 heard. Regular rate and rhythm. No murmurs/rubs/gallops.  Abdomen: Soft. tender to deep palpation in periumbilical region  : No acute abnormalities  Extremities: Warm. No pedal edema  Skin: No lesions noted  Vascular: No evidence of phlebitis; R arm AVF  Neuro: Alert, oriented to time/place/person                          8.0    5.63  )-----------( 103      ( 25 Nov 2020 06:36 )             28.0     11-24    135  |  93<L>  |  56<H>  ----------------------------<  81  5.1   |  25  |  6.43<H>    Ca    9.4      24 Nov 2020 07:40  Phos  3.5     11-24  Mg     2.6     11-24      MICROBIOLOGY:  Culture - Blood (11.17.20 @ 19:07)    Specimen Source: .Blood Blood-Peripheral    Culture Results:   No Growth Final    COVID-19 PCR: NotDetec (11-24-20 @ 21:09)    RADIOLOGY:  imaging below personally reviewed    < from: CT Abdomen and Pelvis w/ IV Cont (11.17.20 @ 20:03) >  IMPRESSION:  Heterogeneous right upper pole renal mass most likely representing a renal cell carcinoma. Heterogeneous enlargement of the right kidney from 3/8/2017, may represent infiltrating disease with superimposed infection not entirely excluded. Suspect tumoral invasion of the right renal vein, however, limitedly assessed. Mass effect with narrowing of the IVC without distinct fat plane. Contrast enhanced MR may be obtained on a nonemergent basis for more detailed evaluation if there are no contraindications.  Large right retrocaval mass, as above, possibly representing necrotic lymph node with mass effect on the IVC.  Liver metastasis.       Patient is a 75y old  Male who presents with a chief complaint of AMS (25 Nov 2020 13:46)    HPI:  75 year old man PMHx ESRD on HD (TTS), emphysema, renal cancer (diagnosed 3 months ago) on sunitinib, HTN, HLD presented to ED with sepsis and AMS on 11/18.     In ED, T 101.5, , /84, SpO2 100% on RA. Hyperkalemic to 6.1 (hemolyzed), elevated troponin 187-> 160, BUN/SCr 70/9.92, CT A/P shows R upper pole renal mass, possible tumoral invasion into R renal vein, large right retrocaval mass with mass effect with narrowing of IVC without distinct fat plane, possibly necrotic lymph node. Also noted liver metastasis.    Pt admitted due to acute encephalopathy with Sepsis concerning for infected necrotic LN. He received Zosyn, 11/17-11/23. Encephalopathy resolved. Also treated for abd pain with narcotics, attributed to prior med, sanitunib vs IVC compression with mets.    Now today with fever to 100.4. ID called for fever.    Pt reports chronic abd pain. AOx3. Denies any nausea, vomiting, diarrhea, dysuria. Was planning to go to rehab. Started having cough this am, does admit to coughing on eating and drinking. + Chills.     Per charts, pt declined any further chemo for RCC.     prior hospital charts reviewed [  ]  primary team notes reviewed [x  ]  other consultant notes reviewed [ x ]    PAST MEDICAL & SURGICAL HISTORY:  Renal cancer    Enlarged prostate without lower urinary tract symptoms (luts)    Emphysema/COPD    Diverticulitis of colon    Aortic valve disorder    AV fistula infection, sequela    Arterial insufficiency of lower extremity  RLE    Dizziness    HLD (hyperlipidemia)    Tobacco abuse  Quit in 2005, Smoked 2.5 PPD for 15 years    COPD (chronic obstructive pulmonary disease)    ESRD (end stage renal disease)    BPH (benign prostatic hyperplasia)    PE (pulmonary embolism)    History of smoking 2 pac for 25 yrs , quit 7 yrs ago    Diverticulitis of colon    HTN (hypertension)    H/O unilateral nephrectomy  9/2013    S/P kidney transplant  09/2013 in [place]    h/o AV fistula - left upper arm    History of colon resection - April 2012    h/o  LUE A-V fistula      Allergies  No Known Allergies    ANTIMICROBIALS (past 90 days)  MEDICATIONS  (STANDING):  piperacillin/tazobactam IVPB.   25 mL/Hr IV Intermittent (11-23-20 @ 17:46)    piperacillin/tazobactam IVPB..   25 mL/Hr IV Intermittent (11-23-20 @ 06:20)   25 mL/Hr IV Intermittent (11-22-20 @ 17:44)   25 mL/Hr IV Intermittent (11-22-20 @ 05:48)   25 mL/Hr IV Intermittent (11-21-20 @ 17:25)   25 mL/Hr IV Intermittent (11-21-20 @ 06:18)   25 mL/Hr IV Intermittent (11-20-20 @ 21:58)   25 mL/Hr IV Intermittent (11-20-20 @ 05:38)   25 mL/Hr IV Intermittent (11-19-20 @ 17:39)   25 mL/Hr IV Intermittent (11-19-20 @ 06:30)   25 mL/Hr IV Intermittent (11-18-20 @ 18:04)   200 mL/Hr IV Intermittent (11-17-20 @ 19:28)      ANTIMICROBIALS:    piperacillin/tazobactam IVPB.. 3.375 every 12 hours  vancomycin  IVPB 1000 once    OTHER MEDS: MEDICATIONS  (STANDING):  acetaminophen    Suspension .. 650 once  ALBUTerol    90 MICROgram(s) HFA Inhaler 2 every 6 hours PRN  aluminum hydroxide/magnesium hydroxide/simethicone Suspension 30 every 4 hours PRN  epoetin katlyn-epbx (RETACRIT) Injectable 61701 <User Schedule>  heparin   Injectable 5000 every 8 hours  oxyCODONE    IR 5 every 4 hours PRN  polyethylene glycol 3350 17 daily  senna 2 at bedtime  tiotropium 18 MICROgram(s) Capsule 1 daily    SOCIAL HISTORY:   Lives alone  Has not had alcohol in a year.  Former smoker x15 years 2 ppd quit 10 years ago.  Denies illicit drug use.       FAMILY HISTORY:  FH: hypertension      REVIEW OF SYSTEMS  [  ] ROS unobtainable because:    [ x ] All other systems negative except as noted below:	    Constitutional:  [x ] fever [ x] chills  [ ] weight loss  [ ] weakness  Skin:  [ ] rash [ ] phlebitis	  Eyes: [ ] icterus [ ] pain  [ ] discharge	  ENMT: [ ] sore throat  [ ] thrush [ ] ulcers [ ] exudates  Respiratory: [ ] dyspnea [ ] hemoptysis [x ] cough [ ] sputum	  Cardiovascular:  [ ] chest pain [ ] palpitations [ ] edema	  Gastrointestinal:  [ ] nausea [ ] vomiting [ ] diarrhea [ ] constipation [x ] pain	  Genitourinary:  [ ] dysuria [ ] frequency [ ] hematuria [ ] discharge [x ] flank pain  [ ] incontinence, + anuric   Musculoskeletal:  [ ] myalgias [ ] arthralgias [ ] arthritis  [ ] back pain  Neurological:  [ ] headache [ ] seizures  [ ] confusion/altered mental status  Psychiatric:  [ ] anxiety [ ] depression	  Endocrine:  [ ] adrenal [ ] thyroid  Allergic/Immunologic:	 [ ] transplant [ ] seasonal    Vital Signs Last 24 Hrs  T(F): 97.5 (11-25-20 @ 12:39), Max: 99 (11-20-20 @ 21:15)  Vital Signs Last 24 Hrs  HR: 90 (11-25-20 @ 12:39) (90 - 110)  BP: 124/38 (11-25-20 @ 12:39) (92/60 - 163/59)  RR: 18 (11-25-20 @ 12:39)  SpO2: 100% (11-25-20 @ 12:39) (100% - 100%)  Wt(kg): --      EXAM:  Constitutional: Not in acute distress  Eyes: pupils bilaterally reactive to light. No icterus.  Oral cavity: poor dentition, no lesions  Neck: supple   RS: clear but diminished sounds bilaterally.   CVS: S1, S2 heard. Regular rate and rhythm.   Abdomen: Soft. tender to deep palpation in periumbilical region  : No acute abnormalities  Extremities: Warm. No pedal edema  Skin: No lesions noted  Vascular: No evidence of phlebitis; R arm AVF, old graft site on lt UE with scars.   Neuro: Alert, oriented to time/place/person                          8.0    5.63  )-----------( 103      ( 25 Nov 2020 06:36 )             28.0     11-24    135  |  93<L>  |  56<H>  ----------------------------<  81  5.1   |  25  |  6.43<H>    Ca    9.4      24 Nov 2020 07:40  Phos  3.5     11-24  Mg     2.6     11-24      MICROBIOLOGY:  Culture - Blood (11.17.20 @ 19:07)    Specimen Source: .Blood Blood-Peripheral    Culture Results:   No Growth Final    COVID-19 PCR: NotDetec (11-24-20 @ 21:09)    RADIOLOGY:  imaging below personally reviewed    < from: CT Abdomen and Pelvis w/ IV Cont (11.17.20 @ 20:03) >  IMPRESSION:  Heterogeneous right upper pole renal mass most likely representing a renal cell carcinoma. Heterogeneous enlargement of the right kidney from 3/8/2017, may represent infiltrating disease with superimposed infection not entirely excluded. Suspect tumoral invasion of the right renal vein, however, limitedly assessed. Mass effect with narrowing of the IVC without distinct fat plane. Contrast enhanced MR may be obtained on a nonemergent basis for more detailed evaluation if there are no contraindications.  Large right retrocaval mass, as above, possibly representing necrotic lymph node with mass effect on the IVC.  Liver metastasis.    < from: Xray Chest 1 View-PORTABLE IMMEDIATE (Xray Chest 1 View-PORTABLE IMMEDIATE .) (11.17.20 @ 19:35) >  IMPRESSION: No acute pulmonary pathology.

## 2020-11-26 LAB
ANION GAP SERPL CALC-SCNC: 14 MMO/L — SIGNIFICANT CHANGE UP (ref 7–14)
BASOPHILS # BLD AUTO: 0.01 K/UL — SIGNIFICANT CHANGE UP (ref 0–0.2)
BASOPHILS NFR BLD AUTO: 0.2 % — SIGNIFICANT CHANGE UP (ref 0–2)
BUN SERPL-MCNC: 58 MG/DL — HIGH (ref 7–23)
CALCIUM SERPL-MCNC: 9.4 MG/DL — SIGNIFICANT CHANGE UP (ref 8.4–10.5)
CHLORIDE SERPL-SCNC: 91 MMOL/L — LOW (ref 98–107)
CO2 SERPL-SCNC: 28 MMOL/L — SIGNIFICANT CHANGE UP (ref 22–31)
CREAT SERPL-MCNC: 6.68 MG/DL — HIGH (ref 0.5–1.3)
EOSINOPHIL # BLD AUTO: 0 K/UL — SIGNIFICANT CHANGE UP (ref 0–0.5)
EOSINOPHIL NFR BLD AUTO: 0 % — SIGNIFICANT CHANGE UP (ref 0–6)
GLUCOSE SERPL-MCNC: 95 MG/DL — SIGNIFICANT CHANGE UP (ref 70–99)
HCT VFR BLD CALC: 26 % — LOW (ref 39–50)
HGB BLD-MCNC: 7.9 G/DL — LOW (ref 13–17)
IMM GRANULOCYTES NFR BLD AUTO: 0.3 % — SIGNIFICANT CHANGE UP (ref 0–1.5)
LYMPHOCYTES # BLD AUTO: 1.11 K/UL — SIGNIFICANT CHANGE UP (ref 1–3.3)
LYMPHOCYTES # BLD AUTO: 18.2 % — SIGNIFICANT CHANGE UP (ref 13–44)
MAGNESIUM SERPL-MCNC: 2.6 MG/DL — SIGNIFICANT CHANGE UP (ref 1.6–2.6)
MCHC RBC-ENTMCNC: 25.2 PG — LOW (ref 27–34)
MCHC RBC-ENTMCNC: 30.4 % — LOW (ref 32–36)
MCV RBC AUTO: 83.1 FL — SIGNIFICANT CHANGE UP (ref 80–100)
MONOCYTES # BLD AUTO: 0.66 K/UL — SIGNIFICANT CHANGE UP (ref 0–0.9)
MONOCYTES NFR BLD AUTO: 10.8 % — SIGNIFICANT CHANGE UP (ref 2–14)
NEUTROPHILS # BLD AUTO: 4.29 K/UL — SIGNIFICANT CHANGE UP (ref 1.8–7.4)
NEUTROPHILS NFR BLD AUTO: 70.5 % — SIGNIFICANT CHANGE UP (ref 43–77)
NRBC # FLD: 0 K/UL — SIGNIFICANT CHANGE UP (ref 0–0)
PHOSPHATE SERPL-MCNC: 4 MG/DL — SIGNIFICANT CHANGE UP (ref 2.5–4.5)
PLATELET # BLD AUTO: 227 K/UL — SIGNIFICANT CHANGE UP (ref 150–400)
PMV BLD: 9.7 FL — SIGNIFICANT CHANGE UP (ref 7–13)
POTASSIUM SERPL-MCNC: 4.3 MMOL/L — SIGNIFICANT CHANGE UP (ref 3.5–5.3)
POTASSIUM SERPL-SCNC: 4.3 MMOL/L — SIGNIFICANT CHANGE UP (ref 3.5–5.3)
RBC # BLD: 3.13 M/UL — LOW (ref 4.2–5.8)
RBC # FLD: 23.7 % — HIGH (ref 10.3–14.5)
SODIUM SERPL-SCNC: 133 MMOL/L — LOW (ref 135–145)
WBC # BLD: 6.09 K/UL — SIGNIFICANT CHANGE UP (ref 3.8–10.5)
WBC # FLD AUTO: 6.09 K/UL — SIGNIFICANT CHANGE UP (ref 3.8–10.5)

## 2020-11-26 PROCEDURE — 99232 SBSQ HOSP IP/OBS MODERATE 35: CPT

## 2020-11-26 RX ORDER — PIPERACILLIN AND TAZOBACTAM 4; .5 G/20ML; G/20ML
3.38 INJECTION, POWDER, LYOPHILIZED, FOR SOLUTION INTRAVENOUS ONCE
Refills: 0 | Status: COMPLETED | OUTPATIENT
Start: 2020-11-26 | End: 2020-11-26

## 2020-11-26 RX ORDER — PIPERACILLIN AND TAZOBACTAM 4; .5 G/20ML; G/20ML
3.38 INJECTION, POWDER, LYOPHILIZED, FOR SOLUTION INTRAVENOUS EVERY 12 HOURS
Refills: 0 | Status: DISCONTINUED | OUTPATIENT
Start: 2020-11-26 | End: 2020-11-26

## 2020-11-26 RX ORDER — CHLORPROMAZINE HCL 10 MG
10 TABLET ORAL ONCE
Refills: 0 | Status: COMPLETED | OUTPATIENT
Start: 2020-11-26 | End: 2020-11-26

## 2020-11-26 RX ORDER — OXYCODONE HYDROCHLORIDE 5 MG/1
5 TABLET ORAL EVERY 6 HOURS
Refills: 0 | Status: DISCONTINUED | OUTPATIENT
Start: 2020-11-26 | End: 2020-12-01

## 2020-11-26 RX ADMIN — HEPARIN SODIUM 5000 UNIT(S): 5000 INJECTION INTRAVENOUS; SUBCUTANEOUS at 06:28

## 2020-11-26 RX ADMIN — SENNA PLUS 2 TABLET(S): 8.6 TABLET ORAL at 21:01

## 2020-11-26 RX ADMIN — Medication 200 MILLIGRAM(S): at 17:51

## 2020-11-26 RX ADMIN — HEPARIN SODIUM 5000 UNIT(S): 5000 INJECTION INTRAVENOUS; SUBCUTANEOUS at 13:04

## 2020-11-26 RX ADMIN — PIPERACILLIN AND TAZOBACTAM 200 GRAM(S): 4; .5 INJECTION, POWDER, LYOPHILIZED, FOR SOLUTION INTRAVENOUS at 13:55

## 2020-11-26 RX ADMIN — TIOTROPIUM BROMIDE 1 CAPSULE(S): 18 CAPSULE ORAL; RESPIRATORY (INHALATION) at 10:57

## 2020-11-26 RX ADMIN — Medication 10 MILLIGRAM(S): at 06:28

## 2020-11-26 RX ADMIN — HEPARIN SODIUM 5000 UNIT(S): 5000 INJECTION INTRAVENOUS; SUBCUTANEOUS at 21:01

## 2020-11-26 RX ADMIN — CHLORHEXIDINE GLUCONATE 1 APPLICATION(S): 213 SOLUTION TOPICAL at 10:57

## 2020-11-26 RX ADMIN — POLYETHYLENE GLYCOL 3350 17 GRAM(S): 17 POWDER, FOR SOLUTION ORAL at 13:04

## 2020-11-26 NOTE — PROGRESS NOTE ADULT - PROBLEM SELECTOR PLAN 8
DVT PPx: HSQ  Diet- renal replacement  Dispo: PT recommending home with home PT. D/w patient's sister Paolo, patient not compliant with medical follow up and does not seem able to care for himself at home. Palliative care team and CM following for dispo planning, now now planned for LTC placement as pt wishes to cw HD making hospice not an option.

## 2020-11-26 NOTE — PROGRESS NOTE ADULT - PROBLEM SELECTOR PLAN 1
with sepsis presumed source infected necrotic lymph node  T 101.5, , Lactate 8 on admission. Possible source is intraabdominal given severe pain. Possible necrotic lymph node seen on CT  - completed Zosyn - 5 day course   - F/u blood cultures- NGTD  -CXR today showed possible LLL infiltrate- pt is clinically stable with no fever, leukocytosis or sob. will hold off abx for now. will start zosyn if pt is febrile or respiratory status deteriorates.  -ID consult appreciated

## 2020-11-26 NOTE — PROGRESS NOTE ADULT - ASSESSMENT
75 M PMHx ESRD on HD (TTS), emphysema, renal cancer (diagnosed 3 months ago) on sunitinib, HTN, HLD presented to ED with metabolic encephalopathy d/t sepsis possibly from necrotic lymph node vs PNA

## 2020-11-26 NOTE — PROGRESS NOTE ADULT - SUBJECTIVE AND OBJECTIVE BOX
Follow Up:      Interval History/ROS:Patient is a 75y old  Male who presents with a chief complaint of AMS (25 Nov 2020 16:39)      Allergies    No Known Allergies    Intolerances        ANTIMICROBIALS:      OTHER MEDS:  ALBUTerol    90 MICROgram(s) HFA Inhaler 2 Puff(s) Inhalation every 6 hours PRN  aluminum hydroxide/magnesium hydroxide/simethicone Suspension 30 milliLiter(s) Oral every 4 hours PRN  chlorhexidine 4% Liquid 1 Application(s) Topical <User Schedule>  epoetin katlyn-epbx (RETACRIT) Injectable 14956 Unit(s) IV Push <User Schedule>  heparin   Injectable 5000 Unit(s) SubCutaneous every 8 hours  oxyCODONE    IR 5 milliGRAM(s) Oral every 6 hours PRN  polyethylene glycol 3350 17 Gram(s) Oral daily  senna 2 Tablet(s) Oral at bedtime  tiotropium 18 MICROgram(s) Capsule 1 Capsule(s) Inhalation daily      Vital Signs Last 24 Hrs  T(C): 36.8 (26 Nov 2020 05:20), Max: 37.3 (25 Nov 2020 17:02)  T(F): 98.2 (26 Nov 2020 05:20), Max: 99.1 (25 Nov 2020 17:02)  HR: 89 (26 Nov 2020 05:20) (86 - 96)  BP: 128/57 (26 Nov 2020 05:20) (108/48 - 144/54)  BP(mean): --  RR: 18 (26 Nov 2020 05:20) (18 - 18)  SpO2: 100% (26 Nov 2020 05:20) (98% - 100%)      EXAM:  Constitutional: Not in acute distress  Eyes: pupils bilaterally reactive to light. No icterus.  Oral cavity: poor dentition, no lesions  Neck: supple   RS: clear but diminished sounds bilaterally.   CVS: S1, S2 heard. Regular rate and rhythm.   Abdomen: Soft. tender to deep palpation in periumbilical region  : No acute abnormalities  Extremities: Warm. No pedal edema  Skin: No lesions noted  Vascular: No evidence of phlebitis; R arm AVF, old graft site on lt UE with scars.   Neuro: Alert, oriented to time/place/person                            7.9    6.09  )-----------( x        ( 26 Nov 2020 07:45 )             26.0       11-26    133<L>  |  91<L>  |  58<H>  ----------------------------<  95  4.3   |  28  |  6.68<H>    Ca    9.4      26 Nov 2020 07:45  Phos  4.0     11-26  Mg     2.6     11-26        MICROBIOLOGY:  Culture - Blood (11.17.20 @ 19:07)    Specimen Source: .Blood Blood-Peripheral    Culture Results:   No Growth Final    COVID-19 PCR: NotDetec (11-24-20 @ 21:09)    RADIOLOGY:  imaging below personally reviewed    < from: CT Abdomen and Pelvis w/ IV Cont (11.17.20 @ 20:03) >  IMPRESSION:  Heterogeneous right upper pole renal mass most likely representing a renal cell carcinoma. Heterogeneous enlargement of the right kidney from 3/8/2017, may represent infiltrating disease with superimposed infection not entirely excluded. Suspect tumoral invasion of the right renal vein, however, limitedly assessed. Mass effect with narrowing of the IVC without distinct fat plane. Contrast enhanced MR may be obtained on a nonemergent basis for more detailed evaluation if there are no contraindications.  Large right retrocaval mass, as above, possibly representing necrotic lymph node with mass effect on the IVC.  Liver metastasis.    < from: Xray Chest 1 View-PORTABLE IMMEDIATE (Xray Chest 1 View-PORTABLE IMMEDIATE .) (11.17.20 @ 19:35) >  IMPRESSION: No acute pulmonary pathology.

## 2020-11-26 NOTE — PROGRESS NOTE ADULT - PROBLEM SELECTOR PLAN 7
Likely anemia of chronic disease given renal failure and malignancy vs blood loss. No active bleeding noted  -cont to monitor  -on epo with HD  - Maintain active type and screen  - Monitor H/H and transfuse to keep Hgb > 7  - Folate and Vitamin B12 levels WNL

## 2020-11-26 NOTE — PROGRESS NOTE ADULT - ASSESSMENT
Assessment:    25 year old man PMHx ESRD on HD (TTS), emphysema, renal cancer (diagnosed 3 months ago) on sunitinib, hypertension, hyperlipidemia, presented to ED with sepsis and acute encephalopathy on 11/18, now resolved. Also with abd pain suspected due to mets.   Received zosyn x 5 days for possible superimposed kidney infection.   Planned for discharge when developed fever today to 100.4    Fever - Differential includes aspiration vs transient bacteremia from the abd vs tumor fever. He completed Zosyn initially x 5day from 11/18-23 and bl clx were negative at the time. Lungs sound clear but he has some throat congestion and endorses coughing when he eats. He is ESRD and does not make urine.         Plan:  # Overall fever, tachycardia, hypotension, new cough and thrombocytopenia  Possible aspiration pneumonitis/pneumonia vs transient bacteremia from the abd vs tumor fever  - Send repeat bl clx, pt anuric   - monitor off abx for now,   - check CXR  - if develops recurrent fever or SOB, recommend to cover for Asp PNA with Zosyn q12, renally dosed  - monitor cbc for leukocytosis and thrombocytopenia        Assessment:  75 year old man PMHx ESRD on HD (TTS), emphysema, renal cancer (diagnosed 3 months ago) on sunitinib, hypertension, hyperlipidemia, presented to ED with sepsis and acute encephalopathy on 11/18, now resolved. Also with abd pain suspected due to mets.   Received zosyn x 5 days for possible superimposed kidney infection.   Planned for discharge when developed fever today to 100.4    Fever - Differential includes aspiration vs transient bacteremia from the abd vs tumor fever. He completed Zosyn initially x 5day from 11/18-23 and bl clx were negative at the time. Lungs sound clear but he has some throat congestion and endorses coughing when he eats. He is ESRD and does not make urine.         Plan:  # Overall fever, tachycardia, hypotension, new cough and thrombocytopenia  Possible aspiration pneumonitis/pneumonia vs transient bacteremia from the abd vs tumor fever  - Send repeat bl clx, pt anuric   - monitor off abx for now,   - check CXR  - if develops recurrent fever or SOB, recommend to cover for Asp PNA with Zosyn q12, renally dosed  - monitor cbc for leukocytosis and thrombocytopenia        Assessment:  75 year old man PMHx ESRD on HD (TTS), emphysema, renal cancer (diagnosed 3 months ago) on sunitinib, hypertension, hyperlipidemia, presented to ED with sepsis and acute encephalopathy on 11/18, now resolved. Also with abd pain suspected due to mets.   Received zosyn x 5 days for possible superimposed kidney infection.   Planned for discharge when developed fever today to 100.4    Fever - Differential includes aspiration vs transient bacteremia from the abd vs tumor fever. He completed Zosyn initially x 5day from 11/18-23 and bl clx were negative at the time. Lungs sound clear but he has some throat congestion and endorses coughing when he eats. He is ESRD and does not make urine.         Plan:  # Overall fever, tachycardia, hypotension at presentation, new cough and thrombocytopenia    BP stable at this time off antimirobials    Possible aspiration pneumonitis/pneumonia vs transient bacteremia from the abd vs tumor fever  - Send repeat bl clx, pt anuric   - monitor off abx for now,   - CXR done with report pending ( ? left effusion)   - if develops recurrent fever or SOB, recommend to cover for Asp PNA with Zosyn q12, renally dosed  - monitor cbc for leukocytosis and thrombocytopenia

## 2020-11-26 NOTE — PROGRESS NOTE ADULT - SUBJECTIVE AND OBJECTIVE BOX
PREOPERATIVE MEDICAL CONSULTATION      DATE:  9/12/2019    NAME:  Alondra Witt    YOB: 1974      CHIEF COMPLAINT:  Bilateral carpal tunnel syndrome    HISTORY OF PRESENT ILLNESS:  The patient is a 45 year old female, who is being evaluated preoperatively at the request of Dr. Maximiliano Oreilly prior to left carpal tunnel release scheduled for 9/16/2019 and right carpal tunnel release procedure scheduled for 9/30/2019    Alondra Witt is a 45-year-old female that was diagnosed with bilateral carpal tunnel syndrome over a year ago on EMG testing.  Unfortunately it is gotten much worse the last four months.  She has subsequently quit working and it is a bit improved but still causes her pain on a daily basis.  Unfortunately she injured her left middle finger couple days ago and she thinks she should ever left hand done 1st as it will be too difficult for her to not use her right hand in only use the left hand that is currently injured.  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  She is also has iron deficiency anemia from menorrhagia      MEDICATIONS:   Current Outpatient Medications   Medication Sig   • lisinopril-hydroCHLOROthiazide (PRINZIDE,ZESTORETIC) 10-12.5 MG per tablet Take 1 tablet by mouth daily.   • levonorgestrel-ethinyl estradiol (NORDETTE) 0.15-30 MG-MCG per tablet Take 1 active pill daily for 6 weeks straight   • HYDROcodone-acetaminophen (NORCO) 5-325 MG per tablet Take 1 tablet by mouth every 6 hours as needed.   • DULoxetine (CYMBALTA) 60 MG capsule Take 1 capsule by mouth daily.   • LORazepam (ATIVAN) 0.5 MG tablet Take 1 tablet by mouth every 6 hours as needed for Anxiety.   • meloxicam (MOBIC) 15 MG tablet Take 1 tablet by mouth daily.   • lidocaine (LIDODERM) 5 % patch apply topically to right foot for 16 hours per day   • Dexlansoprazole 60 MG capsule 1 capsule by  [facility] Division of [facility]  Catrina Edwards MD  Pager [phone]      Patient is a 75y old  Male who presents with a chief complaint of AMS (26 Nov 2020 08:35)      SUBJECTIVE / OVERNIGHT EVENTS:    no fever o/n. Pt cont to report a mild cough. denies sob. CXR showed LLL infiltrate    ADDITIONAL REVIEW OF SYSTEMS:    RESPIRATORY: +cough, No  wheezing, chills or hemoptysis; No shortness of breath  CARDIOVASCULAR: No chest pain, palpitations, dizziness, or leg swelling  GASTROINTESTINAL: No abdominal or epigastric pain. No nausea, vomiting, or hematemesis; No diarrhea or constipation. No melena or hematochezia.      MEDICATIONS  (STANDING):  chlorhexidine 4% Liquid 1 Application(s) Topical <User Schedule>  epoetin katlyn-epbx (RETACRIT) Injectable 67482 Unit(s) IV Push <User Schedule>  heparin   Injectable 5000 Unit(s) SubCutaneous every 8 hours  piperacillin/tazobactam IVPB.. 3.375 Gram(s) IV Intermittent every 12 hours  polyethylene glycol 3350 17 Gram(s) Oral daily  senna 2 Tablet(s) Oral at bedtime  tiotropium 18 MICROgram(s) Capsule 1 Capsule(s) Inhalation daily    MEDICATIONS  (PRN):  ALBUTerol    90 MICROgram(s) HFA Inhaler 2 Puff(s) Inhalation every 6 hours PRN Shortness of Breath and/or Wheezing  aluminum hydroxide/magnesium hydroxide/simethicone Suspension 30 milliLiter(s) Oral every 4 hours PRN Dyspepsia  oxyCODONE    IR 5 milliGRAM(s) Oral every 6 hours PRN Moderate to severe pain      CAPILLARY BLOOD GLUCOSE        I&O's Summary    25 Nov 2020 07:01  -  26 Nov 2020 07:00  --------------------------------------------------------  IN: 500 mL / OUT: 0 mL / NET: 500 mL        PHYSICAL EXAM:  Vital Signs Last 24 Hrs  T(C): 36.6 (26 Nov 2020 12:18), Max: 37.3 (25 Nov 2020 17:02)  T(F): 97.8 (26 Nov 2020 12:18), Max: 99.1 (25 Nov 2020 17:02)  HR: 87 (26 Nov 2020 12:18) (86 - 96)  BP: 101/52 (26 Nov 2020 12:18) (101/52 - 144/54)  BP(mean): --  RR: 18 (26 Nov 2020 12:18) (18 - 18)  SpO2: 98% (26 Nov 2020 12:18) (98% - 100%)    CONSTITUTIONAL: NAD, well-developed, well-groomed  EYES: PERRLA; conjunctiva and sclera clear  ENMT: Moist oral mucosa, no pharyngeal injection or exudates;   NECK: Supple, no palpable masses;  RESPIRATORY: Normal respiratory effort; basilar crackles bilaterally  CARDIOVASCULAR: Regular rate and rhythm, normal S1 and S2, no murmur/rub/gallop; No lower extremity edema; Peripheral pulses are 2+ bilaterally  ABDOMEN: Nontender to palpation, normoactive bowel sounds, no rebound/guarding;  MUSCLOSKELETAL:  ; no clubbing or cyanosis of digits; no joint swelling or tenderness to palpation  PSYCH: A+O to person, place,  affect appropriate  NEUROLOGY: CN 2-12 are intact and symmetric; no gross sensory deficits;   SKIN: No rashes; no palpable lesions    LABS:                        7.9    6.09  )-----------( 227      ( 26 Nov 2020 07:45 )             26.0     11-26    133<L>  |  91<L>  |  58<H>  ----------------------------<  95  4.3   |  28  |  6.68<H>    Ca    9.4      26 Nov 2020 07:45  Phos  4.0     11-26  Mg     2.6     11-26                  RADIOLOGY & ADDITIONAL TESTS:  Results Reviewed:   Imaging Personally Reviewed:  Electrocardiogram Personally Reviewed:    COORDINATION OF CARE:  Care Discussed with Consultants/Other Providers [Y/N]:  Prior or Outpatient Records Reviewed [Y/N]:   mouth daily   • Ibuprofen 200 MG tablet Take 200 mg by mouth every 6 hours as needed for Pain.   • Famotidine (PEPCID PO) Take 2 tablets by mouth daily.     No current facility-administered medications for this visit.        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   • Chronic pain    • Depression    • Dyspepsia    • Esophagitis    • Essential (primary) hypertension    • Gastroesophageal reflux disease    • Hyperhomocysteinemia (CMS/HCC)    • Motion sickness    • PONV (postoperative nausea and vomiting)     Nausea and vomiting on car ride home after procedure   • Rectal bleed    • Tubular adenoma        Past Surgical History:   Procedure Laterality Date   • Ankle arthroscopy w/ synovectomy Right 10/2014   • Ankle ligament reconstruction Right 2015   • Arthrodesis Right 2016    triple ankle-RIGHT ANKLE SUPERFICIAL AND DEEP PERONEAL NERVE DECOMPRESSION AND REVISION TALONAVICULAR FUSION WITH DISTAL TIBIAL BONE GRAFT [id number]; Surgeon: Ari Garsia MD; Location: [facility]; Service: Orthopedics; Laterality: Right;   •  section, classic  2006    related to placental abruption   • D and c  2007   • Egd  10/15/2018    alena   • Endometrial biopsy  2019   • Esophagogastroduodenoscopy  10/15/2018    repeat in 3 years -has had many prior to this one    • Foot arthrodesis Right 2017    LATERAL DISPLACEMENT CALCANEUS OSTEOTOMY [id number]; Surgeon: Ari Garsia MD; Location: [facility]; Service: Orthopedics; Laterality: Right;   • Other blood      had thumb surgery on left thumb for blood clot    • Removal gallbladder  2016   • Spinal cord stimulator implant  2019       Family History   Problem Relation Age of Onset   • High blood pressure Mother    • Diabetes Mother         boarder line   • Thyroid Mother    • Hypertension Mother    •  Early death Father         age 46   • Maximiliano Oreilly

## 2020-11-26 NOTE — PROVIDER CONTACT NOTE (OTHER) - ACTION/TREATMENT ORDERED:
Manual blood pressure to be repeated in one hour as per provider
no further orders at this time.
pt will receive fluids and blood during dialysis. continue to monitor blood pressure/vitals signs
will order medicine.
Awaiting orders. Continue to monitor.
continue to monitor patient.
no intervention needed, will continue to monitor

## 2020-11-26 NOTE — PROGRESS NOTE ADULT - SUBJECTIVE AND OBJECTIVE BOX
[facility] - S Monico / Mayi S /D Terra/ S Chanel/ S Gwendolyn/ Allan Ortiz / M Bernardau/ O Fang  service -[phone], office [phone]  ---------------------------------------------------------------------------------------------------------------    Patient seen and examined bedside    Subjective and Objective: overnight events noted. bp dropped to 87/49 yesterday, s/p NS 500ml bolus w/response. d/c held  pt c/o mild cough    Allergies: No Known Allergies      Hospital Medications:   MEDICATIONS  (STANDING):  chlorhexidine 4% Liquid 1 Application(s) Topical <User Schedule>  epoetin katlyn-epbx (RETACRIT) Injectable 23697 Unit(s) IV Push <User Schedule>  heparin   Injectable 5000 Unit(s) SubCutaneous every 8 hours  polyethylene glycol 3350 17 Gram(s) Oral daily  senna 2 Tablet(s) Oral at bedtime  tiotropium 18 MICROgram(s) Capsule 1 Capsule(s) Inhalation daily      VITALS:  T(F): 97.8 (11-26-20 @ 12:18), Max: 98.7 (11-25-20 @ 21:20)  HR: 87 (11-26-20 @ 12:18)  BP: 101/52 (11-26-20 @ 12:18)  RR: 18 (11-26-20 @ 12:18)  SpO2: 98% (11-26-20 @ 12:18)  Wt(kg): --    11-25 @ 07:01  -  11-26 @ 07:00  --------------------------------------------------------  IN: 500 mL / OUT: 0 mL / NET: 500 mL      PHYSICAL EXAM:  Neck: No JVD  Respiratory: CTAB, no wheezes, rales or rhonchi  Cardiovascular: S1, S2, RRR  Gastrointestinal: BS+, soft, NT/ND  Extremities: No peripheral edema  Neurological: A/O x 3  Psychiatric: Normal mood, normal affect  : No wheatley.   Vascular Access: AVF+thrill    LABS:  11-26    133<L>  |  91<L>  |  58<H>  ----------------------------<  95  4.3   |  28  |  6.68<H>    Ca    9.4      26 Nov 2020 07:45  Phos  4.0     11-26  Mg     2.6     11-26      Creatinine Trend: 6.68 <--, 6.43 <--, 5.05 <--, 6.50 <--, 4.98 <--, 7.71 <--                        7.9    6.09  )-----------( 227      ( 26 Nov 2020 07:45 )             26.0     Urine Studies:    RADIOLOGY & ADDITIONAL STUDIES:  < from: Xray Chest 1 View- PORTABLE-Routine (Xray Chest 1 View- PORTABLE-Routine .) (11.25.20 @ 19:47) >    IMPRESSION:  Left lower lobe infiltrate.    < end of copied text >

## 2020-11-26 NOTE — PROGRESS NOTE ADULT - ASSESSMENT
75M PMHx ESRD on HD (TTS), emphysema, renal cancer (diagnosed 3 months ago) on sunitinib, HTN, HLD presented to ED with sepsis and AMS.  Renal following for ESRD Mx.     ESRD (end stage renal disease) on dialysis.   Maintenance HD schedule TTS  Electrolytes and volume status acceptable.               s/p HD 11/24, uneventful.  (holiday schedule)              plan for next routine HD SAT  renal restrictions in diet  dose all meds for ESRD  Discussed with pt regarding pts wishes with HD and GOC, should his condition further deteriorates from metastatic cancer.   Explained to pt, that it would be appropriate to stop HD, should he becomes too weak to tolerate it. Pt understands his health is declining, however he says he is not ready to stop HD at this time. f/u w/Palliative eval    HTN, bp well controlled. stable. not on meds  Anemia in CKD- Hg <goal. epo 10k added w/hd tiw  AMS -resolved. pt at baseline now  LLL PNA on CXR- afebrile. abxs per primary team. dose for ESRD.    labs, rad, chart reviewed

## 2020-11-26 NOTE — PROGRESS NOTE ADULT - ATTENDING COMMENTS
pl call for q's   [facility]  Office [phone]  Ans Serv [phone]  [facility] - [phone]

## 2020-11-27 LAB
ANION GAP SERPL CALC-SCNC: 14 MMO/L — SIGNIFICANT CHANGE UP (ref 7–14)
BASOPHILS # BLD AUTO: 0 K/UL — SIGNIFICANT CHANGE UP (ref 0–0.2)
BASOPHILS NFR BLD AUTO: 0 % — SIGNIFICANT CHANGE UP (ref 0–2)
BUN SERPL-MCNC: 76 MG/DL — HIGH (ref 7–23)
CALCIUM SERPL-MCNC: 9 MG/DL — SIGNIFICANT CHANGE UP (ref 8.4–10.5)
CHLORIDE SERPL-SCNC: 91 MMOL/L — LOW (ref 98–107)
CO2 SERPL-SCNC: 28 MMOL/L — SIGNIFICANT CHANGE UP (ref 22–31)
CREAT SERPL-MCNC: 7.84 MG/DL — HIGH (ref 0.5–1.3)
EOSINOPHIL # BLD AUTO: 0 K/UL — SIGNIFICANT CHANGE UP (ref 0–0.5)
EOSINOPHIL NFR BLD AUTO: 0 % — SIGNIFICANT CHANGE UP (ref 0–6)
GLUCOSE SERPL-MCNC: 92 MG/DL — SIGNIFICANT CHANGE UP (ref 70–99)
HCT VFR BLD CALC: 24.5 % — LOW (ref 39–50)
HGB BLD-MCNC: 7.1 G/DL — LOW (ref 13–17)
IMM GRANULOCYTES NFR BLD AUTO: 0.2 % — SIGNIFICANT CHANGE UP (ref 0–1.5)
LYMPHOCYTES # BLD AUTO: 0.99 K/UL — LOW (ref 1–3.3)
LYMPHOCYTES # BLD AUTO: 16.6 % — SIGNIFICANT CHANGE UP (ref 13–44)
MAGNESIUM SERPL-MCNC: 2.6 MG/DL — SIGNIFICANT CHANGE UP (ref 1.6–2.6)
MCHC RBC-ENTMCNC: 24.6 PG — LOW (ref 27–34)
MCHC RBC-ENTMCNC: 29 % — LOW (ref 32–36)
MCV RBC AUTO: 84.8 FL — SIGNIFICANT CHANGE UP (ref 80–100)
MONOCYTES # BLD AUTO: 0.64 K/UL — SIGNIFICANT CHANGE UP (ref 0–0.9)
MONOCYTES NFR BLD AUTO: 10.7 % — SIGNIFICANT CHANGE UP (ref 2–14)
NEUTROPHILS # BLD AUTO: 4.34 K/UL — SIGNIFICANT CHANGE UP (ref 1.8–7.4)
NEUTROPHILS NFR BLD AUTO: 72.5 % — SIGNIFICANT CHANGE UP (ref 43–77)
NRBC # FLD: 0 K/UL — SIGNIFICANT CHANGE UP (ref 0–0)
PHOSPHATE SERPL-MCNC: 3.7 MG/DL — SIGNIFICANT CHANGE UP (ref 2.5–4.5)
PLATELET # BLD AUTO: 194 K/UL — SIGNIFICANT CHANGE UP (ref 150–400)
PMV BLD: 9.2 FL — SIGNIFICANT CHANGE UP (ref 7–13)
POTASSIUM SERPL-MCNC: 4.3 MMOL/L — SIGNIFICANT CHANGE UP (ref 3.5–5.3)
POTASSIUM SERPL-SCNC: 4.3 MMOL/L — SIGNIFICANT CHANGE UP (ref 3.5–5.3)
RBC # BLD: 2.89 M/UL — LOW (ref 4.2–5.8)
RBC # FLD: 23.6 % — HIGH (ref 10.3–14.5)
SODIUM SERPL-SCNC: 133 MMOL/L — LOW (ref 135–145)
WBC # BLD: 5.98 K/UL — SIGNIFICANT CHANGE UP (ref 3.8–10.5)
WBC # FLD AUTO: 5.98 K/UL — SIGNIFICANT CHANGE UP (ref 3.8–10.5)

## 2020-11-27 PROCEDURE — 99233 SBSQ HOSP IP/OBS HIGH 50: CPT

## 2020-11-27 PROCEDURE — 99232 SBSQ HOSP IP/OBS MODERATE 35: CPT

## 2020-11-27 RX ORDER — PIPERACILLIN AND TAZOBACTAM 4; .5 G/20ML; G/20ML
3.38 INJECTION, POWDER, LYOPHILIZED, FOR SOLUTION INTRAVENOUS EVERY 12 HOURS
Refills: 0 | Status: DISCONTINUED | OUTPATIENT
Start: 2020-11-27 | End: 2020-11-28

## 2020-11-27 RX ORDER — PIPERACILLIN AND TAZOBACTAM 4; .5 G/20ML; G/20ML
3.38 INJECTION, POWDER, LYOPHILIZED, FOR SOLUTION INTRAVENOUS ONCE
Refills: 0 | Status: COMPLETED | OUTPATIENT
Start: 2020-11-27 | End: 2020-11-27

## 2020-11-27 RX ADMIN — POLYETHYLENE GLYCOL 3350 17 GRAM(S): 17 POWDER, FOR SOLUTION ORAL at 12:26

## 2020-11-27 RX ADMIN — OXYCODONE HYDROCHLORIDE 5 MILLIGRAM(S): 5 TABLET ORAL at 02:00

## 2020-11-27 RX ADMIN — SENNA PLUS 2 TABLET(S): 8.6 TABLET ORAL at 21:00

## 2020-11-27 RX ADMIN — OXYCODONE HYDROCHLORIDE 5 MILLIGRAM(S): 5 TABLET ORAL at 19:19

## 2020-11-27 RX ADMIN — HEPARIN SODIUM 5000 UNIT(S): 5000 INJECTION INTRAVENOUS; SUBCUTANEOUS at 12:26

## 2020-11-27 RX ADMIN — HEPARIN SODIUM 5000 UNIT(S): 5000 INJECTION INTRAVENOUS; SUBCUTANEOUS at 21:00

## 2020-11-27 RX ADMIN — HEPARIN SODIUM 5000 UNIT(S): 5000 INJECTION INTRAVENOUS; SUBCUTANEOUS at 05:25

## 2020-11-27 RX ADMIN — PIPERACILLIN AND TAZOBACTAM 200 GRAM(S): 4; .5 INJECTION, POWDER, LYOPHILIZED, FOR SOLUTION INTRAVENOUS at 21:00

## 2020-11-27 RX ADMIN — PIPERACILLIN AND TAZOBACTAM 200 GRAM(S): 4; .5 INJECTION, POWDER, LYOPHILIZED, FOR SOLUTION INTRAVENOUS at 09:18

## 2020-11-27 RX ADMIN — TIOTROPIUM BROMIDE 1 CAPSULE(S): 18 CAPSULE ORAL; RESPIRATORY (INHALATION) at 09:23

## 2020-11-27 RX ADMIN — CHLORHEXIDINE GLUCONATE 1 APPLICATION(S): 213 SOLUTION TOPICAL at 09:18

## 2020-11-27 RX ADMIN — OXYCODONE HYDROCHLORIDE 5 MILLIGRAM(S): 5 TABLET ORAL at 18:47

## 2020-11-27 RX ADMIN — OXYCODONE HYDROCHLORIDE 5 MILLIGRAM(S): 5 TABLET ORAL at 01:00

## 2020-11-27 NOTE — PROGRESS NOTE ADULT - PROBLEM SELECTOR PLAN 8
DVT PPx: HSQ  Diet- renal replacement  Dispo: PT recommending home with home PT. D/w patient's sister Paolo, patient not compliant with medical follow up and does not seem able to care for himself at home. Palliative care team and CM following for dispo planning, now now planned for LTC placement as pt wishes to cw HD making hospice not an option. DVT PPx: HSQ  Diet- renal replacement - change to dysphagia P swallow eval   Dispo: PT recommending home with home PT. D/w patient's sister Paolo, patient not compliant with medical follow up and does not seem able to care for himself at home. Palliative care team and CM following for dispo planning, now now planned for LTC placement as pt wishes to cw HD making hospice not an option.

## 2020-11-27 NOTE — PROGRESS NOTE ADULT - SUBJECTIVE AND OBJECTIVE BOX
Patient is a 75y old  Male who presents with a chief complaint of AMS (26 Nov 2020 17:26)      SUBJECTIVE / OVERNIGHT EVENTS: no further fevers, still with a cough     ROS:  No HA/DZ  No Vision changes   No CP  No N/V/D  No Edema  No Rash      MEDICATIONS  (STANDING):  chlorhexidine 4% Liquid 1 Application(s) Topical <User Schedule>  epoetin katlyn-epbx (RETACRIT) Injectable 46505 Unit(s) IV Push <User Schedule>  heparin   Injectable 5000 Unit(s) SubCutaneous every 8 hours  piperacillin/tazobactam IVPB.. 3.375 Gram(s) IV Intermittent every 12 hours  polyethylene glycol 3350 17 Gram(s) Oral daily  senna 2 Tablet(s) Oral at bedtime  tiotropium 18 MICROgram(s) Capsule 1 Capsule(s) Inhalation daily    MEDICATIONS  (PRN):  ALBUTerol    90 MICROgram(s) HFA Inhaler 2 Puff(s) Inhalation every 6 hours PRN Shortness of Breath and/or Wheezing  aluminum hydroxide/magnesium hydroxide/simethicone Suspension 30 milliLiter(s) Oral every 4 hours PRN Dyspepsia  guaiFENesin   Syrup  (Sugar-Free) 200 milliGRAM(s) Oral every 6 hours PRN Cough  oxyCODONE    IR 5 milliGRAM(s) Oral every 6 hours PRN Moderate to severe pain      T(C): 36.8 (11-27-20 @ 05:23)  HR: 85 (11-27-20 @ 05:23)  BP: 136/53 (11-27-20 @ 05:23)  RR: 16 (11-27-20 @ 05:23)  SpO2: 100% (11-27-20 @ 05:23)  CAPILLARY BLOOD GLUCOSE        I&O's Summary      PHYSICAL EXAM:  GENERAL: NAD, Thin   HEAD:  Atraumatic, Normocephalic  EYES: EOMI, PERRL, conjunctiva and sclera clear  NECK: Supple, No JVD  CHEST/LUNG: Dec BS LLL  HEART: Regular rate and rhythm; No murmurs, rubs, or gallops, No Edema  ABDOMEN: Soft, Nontender, Nondistended; Bowel sounds present  EXTREMITIES:  2+ Peripheral Pulses, No clubbing, cyanosis  PSYCH: No SI/HI  SKIN: AVF    LABS:                        7.1    5.98  )-----------( 194      ( 27 Nov 2020 05:39 )             24.5     11-27    133<L>  |  91<L>  |  76<H>  ----------------------------<  92  4.3   |  28  |  7.84<H>    Ca    9.0      27 Nov 2020 05:39  Phos  3.7     11-27  Mg     2.6     11-27                    RADIOLOGY & ADDITIONAL TESTS:    Imaging Personally Reviewed:    Consultant(s) Notes Reviewed:      Care Discussed with Consultants/Other Providers:

## 2020-11-27 NOTE — SWALLOW BEDSIDE ASSESSMENT ADULT - SWALLOW EVAL: PROGNOSIS
DIAGNOSIS CONTINUED: 4. Patient demonstrated functional pharyngeal phase of swallow with puree, soft solids, regular solids and thin liquids characterized by adequate pharyngeal swallow trigger and present hyolaryngeal elevation upon digital palpation. No overt signs or symptoms of aspiration/penetration noted.

## 2020-11-27 NOTE — SWALLOW BEDSIDE ASSESSMENT ADULT - SWALLOW EVAL: RECOMMENDED DIET
1. Dysphagia 3 Diet (soft solids) with thin liquids per patient's request. 2. Patient would benefit from Cinesophagram to objectively assess pharyngeal swallow and rule out silent aspiration.

## 2020-11-27 NOTE — PROGRESS NOTE ADULT - ATTENDING COMMENTS
King Ibarra  Pager: [phone]. If no response or past 5 pm call [phone].     Please call ID service for questions over weekend at [phone].

## 2020-11-27 NOTE — SWALLOW BEDSIDE ASSESSMENT ADULT - SWALLOW EVAL: DIAGNOSIS
1. Patient demonstrated functional oral management with puree characterized by adequate stripping of bolus from utensil, bolus manipulation, A-P transport and oral clearance. 2. Patient demonstrated mild oral dysphagia with soft solids and thin liquids characterized by shortness of breath during intake, prolonged mastication secondary to dentition, adequate bolus manipulation, propulsion and oral clearance. Patient reporting shortness of breath with PO intake to be typical and that it started "many months ago." 3. Patient demonstrated mild-moderate oral dysphagia with regular solids characterized by shortness of breath during intake, prolonged and effortful mastication secondary to dentition, adequate bolus manipulation, reduced A-P transport and lingual stasis.

## 2020-11-27 NOTE — CHART NOTE - NSCHARTNOTEFT_GEN_A_CORE
Source: Patient [x ]    Family [ ]     other [x ] chart review     Nutrition f/u for severe protein calorie malnutrition. 74 y/o M with metastatic renal cancer, PMH ESRD on HD here with SIRS and AMS. Per chart review, plan to d/c to rehab. then LTC and continue HD. Poor prognosis noted. Pt reports good appetite and PO intake eating >50% meals and 3 Nepro daily. Pt c/o abdominal pain. Denies any nausea/vomiting/diarrhea/constipation. Pt ordered for swallow assessment (pt was febrile 11/25 and aspiration PNA suspected) and SLP recommended dysphagia 3 soft, thin liquids.     Diet, Dysphagia 3 Soft-Thin Liquids:   For patients receiving Renal Replacement - No Protein Restr, No Conc K, No Conc Phos, Low Sodium (RENAL) (11-27-20 @ 14:10)      PO intake:  < 50% [ ] 50-75% [ ]   % [ x]  other :  Current Weight: Weight (kg): 80 (11-18) [likely erroneous]  (11/20) 139.3 pounds   (11/24) 139.7 pounds       Edema: none  Pressure Injuries: none    __________________ Pertinent Medications__________________   MEDICATIONS  (STANDING):  chlorhexidine 4% Liquid 1 Application(s) Topical <User Schedule>  epoetin katlyn-epbx (RETACRIT) Injectable 54216 Unit(s) IV Push <User Schedule>  heparin   Injectable 5000 Unit(s) SubCutaneous every 8 hours  piperacillin/tazobactam IVPB.. 3.375 Gram(s) IV Intermittent every 12 hours  polyethylene glycol 3350 17 Gram(s) Oral daily  senna 2 Tablet(s) Oral at bedtime  tiotropium 18 MICROgram(s) Capsule 1 Capsule(s) Inhalation daily        __________________ Pertinent Labs__________________   11-27 Na133 mmol/L<L> Glu 92 mg/dL K+ 4.3 mmol/L Cr  7.84 mg/dL<H> BUN 76 mg/dL<H> 11-27 Phos 3.7 mg/dL      Estimated Needs:   [x ] no change since previous assessment  [ ] recalculated:       Previous Nutrition Diagnosis: severe protein calorie malnutrition     Nutrition Diagnosis is [x ] ongoing  [ ] resolved [ ] not applicable       Recommendations:  1. Consider liberalizing diet to low Na, Nepro 3x daily (1,275 kcals, 52.3g protein).   2. Encourage PO intake and honor food preferences as able.   3. Bowel regimen PRN.         Monitoring and Evaluation:     [x ] PO intake [ x] Tolerance to diet prescription [x ] weights [x ] follow up per protocol  [ ] other:

## 2020-11-27 NOTE — PROGRESS NOTE ADULT - PROBLEM SELECTOR PLAN 7
Likely anemia of chronic disease given renal failure and malignancy. No active bleeding noted  - h/h trending down, will transfuse 1 unit PRBC with HD today   -cont to monitor  -on epo with HD  - Maintain active type and screen  - Folate and Vitamin B12 levels WNL Likely anemia of chronic disease given renal failure and malignancy. No active bleeding noted  - h/h trending down, scheduled for HD erwin, will trend hgb and if below 7 plan for transfusion erwin via HD  -cont to monitor  -on epo with HD  - Maintain active type and screen  - Folate and Vitamin B12 levels WNL

## 2020-11-27 NOTE — PROGRESS NOTE ADULT - ATTENDING COMMENTS
Yosi See MD  [facility]  Office [phone]  Ans Serv [phone] cell - [phone]

## 2020-11-27 NOTE — PROGRESS NOTE ADULT - PROBLEM SELECTOR PLAN 1
initially with sepsis presumed source infected necrotic lymph node on admission   T 101.5, , Lactate 8 on admission. Possible source is intraabdominal given severe pain. Possible necrotic lymph node seen on CT  - completed Zosyn - 5 day course   - blood cultures- NGTD  - Then with recurrent fever and now new LLL infiltrate- will start Zosyn, check sputum cx, awaiting swallow eval, repeat bcx remain NGTD - ID f.u

## 2020-11-27 NOTE — PROGRESS NOTE ADULT - SUBJECTIVE AND OBJECTIVE BOX
75y old  Male who presents with a chief complaint of AMS (27 Nov 2020 12:03)      Interval history:  Afebrile, cough improved, no diarrhea. Abdominal pain unchanged.       Allergies:   No Known Allergies    Antimicrobials:  piperacillin/tazobactam IVPB.. 3.375 Gram(s) IV Intermittent every 12 hours      REVIEW OF SYSTEMS:  No chest pain  No SOB  No N/V  No rash.       Vital Signs Last 24 Hrs  T(C): 36.7 (11-27-20 @ 14:35), Max: 36.8 (11-26-20 @ 20:49)  T(F): 98 (11-27-20 @ 14:35), Max: 98.3 (11-26-20 @ 20:49)  HR: 66 (11-27-20 @ 14:35) (66 - 91)  BP: 143/50 (11-27-20 @ 14:35) (118/57 - 143/50)  BP(mean): --  RR: 17 (11-27-20 @ 14:35) (16 - 20)  SpO2: 99% (11-27-20 @ 14:35) (98% - 100%)      PHYSICAL EXAM:  Patient in no acute distress. Alert, awake.   rt arm fistula   Cardiovascular: S1S2 normal.  Lungs: + air entry B/L lung snell, Lt basilar rales   Gastrointestinal: soft, mild discomfort on palpation, nondistended.  Extremities: no edema.  IV sites not inflamed.                             7.1    5.98  )-----------( 194      ( 27 Nov 2020 05:39 )             24.5   11-27    133<L>  |  91<L>  |  76<H>  ----------------------------<  92  4.3   |  28  |  7.84<H>    Ca    9.0      27 Nov 2020 05:39  Phos  3.7     11-27  Mg     2.6     11-27      Culture - Blood (collected 25 Nov 2020 23:15)  Source: .Blood Blood-Peripheral  Preliminary Report (27 Nov 2020 01:01):    No growth to date.    Culture - Blood (collected 25 Nov 2020 23:15)  Source: .Blood Blood-Peripheral  Preliminary Report (27 Nov 2020 01:01):    No growth to date.      Radiology:  < from: Xray Chest 1 View- PORTABLE-Routine (Xray Chest 1 View- PORTABLE-Routine .) (11.25.20 @ 19:47) >    IMPRESSION:  Left lower lobe infiltrate.

## 2020-11-27 NOTE — PROGRESS NOTE ADULT - SUBJECTIVE AND OBJECTIVE BOX
Nephrology Followup Note - [phone] - Dr See / Dr Marc / Dr Snow / Dr Ellison / Dr Buchanan / Dr Headley / Dr Ortiz / Dr Jamison  Pt seen and examined at bedside  No acute events overnight. c/o abd pain when eating.     Allergies:  No Known Allergies    Hospital Medications:   MEDICATIONS  (STANDING):  chlorhexidine 4% Liquid 1 Application(s) Topical <User Schedule>  epoetin katlyn-epbx (RETACRIT) Injectable 19747 Unit(s) IV Push <User Schedule>  heparin   Injectable 5000 Unit(s) SubCutaneous every 8 hours  piperacillin/tazobactam IVPB.. 3.375 Gram(s) IV Intermittent every 12 hours  polyethylene glycol 3350 17 Gram(s) Oral daily  senna 2 Tablet(s) Oral at bedtime  tiotropium 18 MICROgram(s) Capsule 1 Capsule(s) Inhalation daily    VITALS:  T(F): 98.2 (11-27-20 @ 05:23), Max: 98.3 (11-26-20 @ 20:49)  HR: 85 (11-27-20 @ 05:23)  BP: 136/53 (11-27-20 @ 05:23)  RR: 16 (11-27-20 @ 05:23)  SpO2: 100% (11-27-20 @ 05:23)  Wt(kg): --      PHYSICAL EXAM:  Constitutional: NAD  HEENT: anicteric sclera, oropharynx clear, MMM  Neck: No JVD  Respiratory: CTAB, no wheezes, rales or rhonchi  Cardiovascular: S1, S2, RRR  Gastrointestinal: BS+, soft, NT/ND  Extremities: No cyanosis or clubbing. No peripheral edema  Neurological: A/O x 3, no focal deficits  Psychiatric: Normal mood, normal affect  : No CVA tenderness. No wheatley.   Skin: No rashes  Vascular Access: UE AV access +thrill and bruit.     LABS:  11-27    133<L>  |  91<L>  |  76<H>  ----------------------------<  92  4.3   |  28  |  7.84<H>    Ca    9.0      27 Nov 2020 05:39  Phos  3.7     11-27  Mg     2.6     11-27      Creatinine Trend: 7.84 <--, 6.68 <--, 6.43 <--, 5.05 <--, 6.50 <--, 4.98 <--                        7.1    5.98  )-----------( 194      ( 27 Nov 2020 05:39 )             24.5     Urine Studies:      RADIOLOGY & ADDITIONAL STUDIES:

## 2020-11-27 NOTE — PROGRESS NOTE ADULT - ASSESSMENT
75M PMHx ESRD on HD (TTS), emphysema, renal cancer (diagnosed 3 months ago) on sunitinib, HTN, HLD presented to ED with sepsis and AMS.  Renal following for ESRD Mx.     ESRD (end stage renal disease) on dialysis.   Maintenance HD schedule TTS  Electrolytes and volume status acceptable.               s/p HD 11/24, uneventful.  (holiday schedule)              plan for next routine HD tomorrow  renal restrictions in diet  dose all meds for ESRD    HTN, bp well controlled. stable. not on meds  Anemia in CKD- Hg <goal. epo 10k added w/hd tiw  AMS -resolved. pt at baseline now  LLL PNA on CXR- afebrile. abxs per primary team. dose for ESRD.    labs, rad, chart reviewed

## 2020-11-27 NOTE — PROGRESS NOTE ADULT - ASSESSMENT
75 M PMHx ESRD on HD (TTS), emphysema, renal cancer (diagnosed 3 months ago) on sunitinib, HTN, HLD presented to ED with metabolic encephalopathy d/t sepsis possibly from necrotic lymph s/p course of zosyn, course c/b recurrent fever. sepsis d/t LLL PNA

## 2020-11-27 NOTE — SWALLOW BEDSIDE ASSESSMENT ADULT - COMMENTS
H&P 11/18/2020: 75M PMHx ESRD on HD (TTS), emphysema, renal cancer (diagnosed 3 months ago) on sunitinib, HTN, HLD presented to ED with sepsis and AMS.  CXR 11/17/2020: No acute pulmonary pathology.  CXR 11/25/2020: Left lower lobe infiltrate.    Consult received and chart reviewed. Patient seen at bedside for swallow evaluation; alert and oriented x 4. Patient able to make wants/needs known and follow commands. Per RN, patient with baseline cough yesterday and took cough medication to suppress cough. Baseline wet cough noted at bedside; patient reporting cough "started a couple of days ago."    Results and recommendations discussed with patient, RN, and MD on unit.

## 2020-11-27 NOTE — PROGRESS NOTE ADULT - ASSESSMENT
Assessment:  75 year old man PMHx ESRD on HD (TTS), emphysema, renal cancer (diagnosed 3 months ago) on sunitinib, hypertension, hyperlipidemia, presented to ED with sepsis and acute encephalopathy on 11/18, now resolved. Also with abd pain suspected due to mets.   Received zosyn x 5 days for possible superimposed kidney infection.   Planned for discharge when developed fever today to 100.4    Fever - Differential includes aspiration vs transient bacteremia from the abd vs tumor fever. He completed Zosyn initially x 5day from 11/18-23 and bl clx were negative at the time   Resolved fever, clinically not behaving like a pneumonia.   Afebrile, no leucocytosis, saturating well. No hypotension.       Overall fever, tachycardia, hypotension at presentation, abnormal cxr     Plan:  BP stable at this time off antimirobials  No fever or leucocytosis   blood cx NTD   recommend to monitor off abx for now,   can consider CT chest to evaluate lt sided opacity better.   if develops recurrent fever or SOB, recommend to cover for Asp PNA with Zosyn q12, renally dosed

## 2020-11-28 LAB
ANION GAP SERPL CALC-SCNC: 16 MMO/L — HIGH (ref 7–14)
BASOPHILS # BLD AUTO: 0 K/UL — SIGNIFICANT CHANGE UP (ref 0–0.2)
BASOPHILS NFR BLD AUTO: 0 % — SIGNIFICANT CHANGE UP (ref 0–2)
BLD GP AB SCN SERPL QL: NEGATIVE — SIGNIFICANT CHANGE UP
BUN SERPL-MCNC: 90 MG/DL — HIGH (ref 7–23)
CALCIUM SERPL-MCNC: 9 MG/DL — SIGNIFICANT CHANGE UP (ref 8.4–10.5)
CHLORIDE SERPL-SCNC: 90 MMOL/L — LOW (ref 98–107)
CO2 SERPL-SCNC: 26 MMOL/L — SIGNIFICANT CHANGE UP (ref 22–31)
CREAT SERPL-MCNC: 8.5 MG/DL — HIGH (ref 0.5–1.3)
EOSINOPHIL # BLD AUTO: 0.01 K/UL — SIGNIFICANT CHANGE UP (ref 0–0.5)
EOSINOPHIL NFR BLD AUTO: 0.2 % — SIGNIFICANT CHANGE UP (ref 0–6)
GLUCOSE SERPL-MCNC: 96 MG/DL — SIGNIFICANT CHANGE UP (ref 70–99)
GRAM STN FLD: SIGNIFICANT CHANGE UP
HCT VFR BLD CALC: 24.3 % — LOW (ref 39–50)
HGB BLD-MCNC: 7.2 G/DL — LOW (ref 13–17)
IMM GRANULOCYTES NFR BLD AUTO: 0.3 % — SIGNIFICANT CHANGE UP (ref 0–1.5)
LYMPHOCYTES # BLD AUTO: 1.05 K/UL — SIGNIFICANT CHANGE UP (ref 1–3.3)
LYMPHOCYTES # BLD AUTO: 17.5 % — SIGNIFICANT CHANGE UP (ref 13–44)
MAGNESIUM SERPL-MCNC: 2.7 MG/DL — HIGH (ref 1.6–2.6)
MCHC RBC-ENTMCNC: 24.6 PG — LOW (ref 27–34)
MCHC RBC-ENTMCNC: 29.6 % — LOW (ref 32–36)
MCV RBC AUTO: 82.9 FL — SIGNIFICANT CHANGE UP (ref 80–100)
MONOCYTES # BLD AUTO: 0.75 K/UL — SIGNIFICANT CHANGE UP (ref 0–0.9)
MONOCYTES NFR BLD AUTO: 12.5 % — SIGNIFICANT CHANGE UP (ref 2–14)
NEUTROPHILS # BLD AUTO: 4.18 K/UL — SIGNIFICANT CHANGE UP (ref 1.8–7.4)
NEUTROPHILS NFR BLD AUTO: 69.5 % — SIGNIFICANT CHANGE UP (ref 43–77)
NRBC # FLD: 0 K/UL — SIGNIFICANT CHANGE UP (ref 0–0)
PHOSPHATE SERPL-MCNC: 4 MG/DL — SIGNIFICANT CHANGE UP (ref 2.5–4.5)
PLATELET # BLD AUTO: 194 K/UL — SIGNIFICANT CHANGE UP (ref 150–400)
PMV BLD: 9.5 FL — SIGNIFICANT CHANGE UP (ref 7–13)
POTASSIUM SERPL-MCNC: 4.5 MMOL/L — SIGNIFICANT CHANGE UP (ref 3.5–5.3)
POTASSIUM SERPL-SCNC: 4.5 MMOL/L — SIGNIFICANT CHANGE UP (ref 3.5–5.3)
RBC # BLD: 2.93 M/UL — LOW (ref 4.2–5.8)
RBC # FLD: 23.3 % — HIGH (ref 10.3–14.5)
RH IG SCN BLD-IMP: POSITIVE — SIGNIFICANT CHANGE UP
SODIUM SERPL-SCNC: 132 MMOL/L — LOW (ref 135–145)
SPECIMEN SOURCE: SIGNIFICANT CHANGE UP
WBC # BLD: 6.01 K/UL — SIGNIFICANT CHANGE UP (ref 3.8–10.5)
WBC # FLD AUTO: 6.01 K/UL — SIGNIFICANT CHANGE UP (ref 3.8–10.5)

## 2020-11-28 PROCEDURE — 99233 SBSQ HOSP IP/OBS HIGH 50: CPT

## 2020-11-28 PROCEDURE — 71250 CT THORAX DX C-: CPT | Mod: 26

## 2020-11-28 RX ADMIN — HEPARIN SODIUM 5000 UNIT(S): 5000 INJECTION INTRAVENOUS; SUBCUTANEOUS at 13:12

## 2020-11-28 RX ADMIN — OXYCODONE HYDROCHLORIDE 5 MILLIGRAM(S): 5 TABLET ORAL at 02:44

## 2020-11-28 RX ADMIN — Medication 200 MILLIGRAM(S): at 22:10

## 2020-11-28 RX ADMIN — PIPERACILLIN AND TAZOBACTAM 200 GRAM(S): 4; .5 INJECTION, POWDER, LYOPHILIZED, FOR SOLUTION INTRAVENOUS at 08:39

## 2020-11-28 RX ADMIN — TIOTROPIUM BROMIDE 1 CAPSULE(S): 18 CAPSULE ORAL; RESPIRATORY (INHALATION) at 12:24

## 2020-11-28 RX ADMIN — OXYCODONE HYDROCHLORIDE 5 MILLIGRAM(S): 5 TABLET ORAL at 22:30

## 2020-11-28 RX ADMIN — HEPARIN SODIUM 5000 UNIT(S): 5000 INJECTION INTRAVENOUS; SUBCUTANEOUS at 21:30

## 2020-11-28 RX ADMIN — OXYCODONE HYDROCHLORIDE 5 MILLIGRAM(S): 5 TABLET ORAL at 03:44

## 2020-11-28 RX ADMIN — HEPARIN SODIUM 5000 UNIT(S): 5000 INJECTION INTRAVENOUS; SUBCUTANEOUS at 05:10

## 2020-11-28 RX ADMIN — CHLORHEXIDINE GLUCONATE 1 APPLICATION(S): 213 SOLUTION TOPICAL at 12:24

## 2020-11-28 RX ADMIN — OXYCODONE HYDROCHLORIDE 5 MILLIGRAM(S): 5 TABLET ORAL at 21:31

## 2020-11-28 RX ADMIN — SENNA PLUS 2 TABLET(S): 8.6 TABLET ORAL at 21:30

## 2020-11-28 RX ADMIN — ERYTHROPOIETIN 10000 UNIT(S): 10000 INJECTION, SOLUTION INTRAVENOUS; SUBCUTANEOUS at 16:25

## 2020-11-28 RX ADMIN — Medication 30 MILLILITER(S): at 05:47

## 2020-11-28 NOTE — PROGRESS NOTE ADULT - PROBLEM SELECTOR PLAN 7
Likely anemia of chronic disease given renal failure and malignancy. No active bleeding noted  - h/h trending down, scheduled for HD erwin, will trend hgb and if below 7 plan for transfusion erwin via HD  -cont to monitor  -on epo with HD  - Maintain active type and screen  - Folate and Vitamin B12 levels WNL

## 2020-11-28 NOTE — PROGRESS NOTE ADULT - ATTENDING COMMENTS
Yosi See MD  [facility]  Office [phone]  Ans Serv [phone] cell - [phone]

## 2020-11-28 NOTE — PROGRESS NOTE ADULT - PROBLEM SELECTOR PLAN 8
DVT PPx: HSQ  Diet- renal replacement - change to dysphagia P swallow eval   Dispo: PT recommending home with home PT. D/w patient's sister Paolo, patient not compliant with medical follow up and does not seem able to care for himself at home. Palliative care team and CM following for dispo planning, now now planned for LTC placement as pt wishes to cw HD making hospice not an option.

## 2020-11-28 NOTE — PROGRESS NOTE ADULT - ASSESSMENT
75M PMHx ESRD on HD (TTS), emphysema, renal cancer (diagnosed 3 months ago) on sunitinib, HTN, HLD presented to ED with sepsis and AMS.  Renal following for ESRD Mx.     ESRD (end stage renal disease) on dialysis.   Maintenance HD schedule TTS  Electrolytes and volume status acceptable.               plan for next routine HD today, low UF goals  renal restrictions in diet  dose all meds for ESRD    HTN, bp well controlled. stable. not on meds  Anemia in CKD- Hg <goal. epo 10k added w/hd tiw  AMS -resolved. pt at baseline now  LLL PNA on CXR- afebrile. monitor off abx, as per ID. dose for ESRD.    labs, rad, chart reviewed

## 2020-11-28 NOTE — PROGRESS NOTE ADULT - SUBJECTIVE AND OBJECTIVE BOX
[facility] Division of Hospital Medicine  aCtrina Edwards MD  Pager [phone]      Patient is a 75y old  Male who presents with a chief complaint of AMS (28 Nov 2020 12:34)      SUBJECTIVE / OVERNIGHT EVENTS:    No fever o/n. Pt denies sob, still has mild cough. No new complaint     ADDITIONAL REVIEW OF SYSTEMS:    RESPIRATORY: + cough, No shortness of breath  CARDIOVASCULAR: No chest pain, palpitations, dizziness, or leg swelling  GASTROINTESTINAL: No abdominal or epigastric pain. No nausea, vomiting, or hematemesis; No diarrhea or constipation. No melena or hematochezia.      MEDICATIONS  (STANDING):  chlorhexidine 4% Liquid 1 Application(s) Topical <User Schedule>  epoetin katlyn-epbx (RETACRIT) Injectable 21765 Unit(s) IV Push <User Schedule>  heparin   Injectable 5000 Unit(s) SubCutaneous every 8 hours  polyethylene glycol 3350 17 Gram(s) Oral daily  senna 2 Tablet(s) Oral at bedtime  tiotropium 18 MICROgram(s) Capsule 1 Capsule(s) Inhalation daily    MEDICATIONS  (PRN):  ALBUTerol    90 MICROgram(s) HFA Inhaler 2 Puff(s) Inhalation every 6 hours PRN Shortness of Breath and/or Wheezing  aluminum hydroxide/magnesium hydroxide/simethicone Suspension 30 milliLiter(s) Oral every 4 hours PRN Dyspepsia  guaiFENesin   Syrup  (Sugar-Free) 200 milliGRAM(s) Oral every 6 hours PRN Cough  oxyCODONE    IR 5 milliGRAM(s) Oral every 6 hours PRN Moderate to severe pain      CAPILLARY BLOOD GLUCOSE        I&O's Summary      PHYSICAL EXAM:  Vital Signs Last 24 Hrs  T(C): 36.4 (28 Nov 2020 12:24), Max: 36.7 (27 Nov 2020 14:35)  T(F): 97.6 (28 Nov 2020 12:24), Max: 98 (27 Nov 2020 14:35)  HR: 81 (28 Nov 2020 12:24) (66 - 89)  BP: 132/56 (28 Nov 2020 12:24) (131/50 - 143/50)  BP(mean): --  RR: 18 (28 Nov 2020 12:24) (17 - 18)  SpO2: 92% (28 Nov 2020 12:24) (92% - 99%)    CONSTITUTIONAL: NAD, well-developed, well-groomed  EYES: PERRLA; conjunctiva and sclera clear  ENMT: Moist oral mucosa, no pharyngeal injection or exudates;   NECK: Supple, no palpable masses;  RESPIRATORY: Normal respiratory effort; basilar crackles bilaterally  CARDIOVASCULAR: Regular rate and rhythm, normal S1 and S2, no murmur/rub/gallop; No lower extremity edema; Peripheral pulses are 2+ bilaterally  ABDOMEN: Nontender to palpation, normoactive bowel sounds, no rebound/guarding;  MUSCLOSKELETAL:  ; no clubbing or cyanosis of digits; no joint swelling or tenderness to palpation  PSYCH: A+O to person, place,  affect appropriate  NEUROLOGY: CN 2-12 are intact and symmetric; no gross sensory deficits;   SKIN: No rashes; no palpable lesions    LABS:                        7.2    6.01  )-----------( 194      ( 28 Nov 2020 03:30 )             24.3     11-28    132<L>  |  90<L>  |  90<H>  ----------------------------<  96  4.5   |  26  |  8.50<H>    Ca    9.0      28 Nov 2020 03:30  Phos  4.0     11-28  Mg     2.7     11-28                Culture - Sputum (collected 28 Nov 2020 00:35)  Source: .Sputum Sputum  Gram Stain (28 Nov 2020 02:32):    Rare polymorphonuclear leukocytes per low power field    Few Squamous epithelial cells per low power field    No organisms seen per oil power field    Culture - Blood (collected 25 Nov 2020 23:15)  Source: .Blood Blood-Peripheral  Preliminary Report (27 Nov 2020 01:01):    No growth to date.    Culture - Blood (collected 25 Nov 2020 23:15)  Source: .Blood Blood-Peripheral  Preliminary Report (27 Nov 2020 01:01):    No growth to date.        RADIOLOGY & ADDITIONAL TESTS:  Results Reviewed:   Imaging Personally Reviewed:  Electrocardiogram Personally Reviewed:    COORDINATION OF CARE:  Care Discussed with Consultants/Other Providers [Y/N]:  Prior or Outpatient Records Reviewed [Y/N]:

## 2020-11-28 NOTE — PROGRESS NOTE ADULT - PROBLEM SELECTOR PLAN 1
initially with sepsis presumed source infected necrotic lymph node on admission   T 101.5, , Lactate 8 on admission. Possible source inc intraabdominal vs PNA  - completed Zosyn - 5 day course   - blood cultures- NGTD  -ID following- monitor off abx for now. will start zosyn if febrile again. will order CT chest  -f/u cineesophagogram r/o dysphagia

## 2020-11-28 NOTE — PROGRESS NOTE ADULT - SUBJECTIVE AND OBJECTIVE BOX
Nephrology Followup Note - [phone] - Dr See / Dr Marc / Dr Snow / Dr Ellison / Dr Buchanan / Dr Headley / Dr Ortiz / Dr Jamison  Pt seen and examined at bedside  No acute events overnight. No complaints.     Allergies:  No Known Allergies    Hospital Medications:   MEDICATIONS  (STANDING):  chlorhexidine 4% Liquid 1 Application(s) Topical <User Schedule>  epoetin katlyn-epbx (RETACRIT) Injectable 51309 Unit(s) IV Push <User Schedule>  heparin   Injectable 5000 Unit(s) SubCutaneous every 8 hours  piperacillin/tazobactam IVPB.. 3.375 Gram(s) IV Intermittent every 12 hours  polyethylene glycol 3350 17 Gram(s) Oral daily  senna 2 Tablet(s) Oral at bedtime  tiotropium 18 MICROgram(s) Capsule 1 Capsule(s) Inhalation daily    VITALS:  T(F): 97.6 (11-28-20 @ 12:24), Max: 98 (11-27-20 @ 14:35)  HR: 81 (11-28-20 @ 12:24)  BP: 132/56 (11-28-20 @ 12:24)  RR: 18 (11-28-20 @ 12:24)  SpO2: 92% (11-28-20 @ 12:24)  Wt(kg): --      PHYSICAL EXAM:  Constitutional: NAD  HEENT: anicteric sclera, oropharynx clear, MMM  Neck: No JVD  Respiratory: CTAB, no wheezes, rales or rhonchi  Cardiovascular: S1, S2, RRR  Gastrointestinal: BS+, soft, NT/ND  Extremities: No cyanosis or clubbing. No peripheral edema  Neurological: A/O x 3, no focal deficits  Psychiatric: Normal mood, normal affect  : No CVA tenderness. No wheatley.   Skin: No rashes  Vascular Access: UE AV access +thrill and bruit.     LABS:  11-28    132<L>  |  90<L>  |  90<H>  ----------------------------<  96  4.5   |  26  |  8.50<H>    Ca    9.0      28 Nov 2020 03:30  Phos  4.0     11-28  Mg     2.7     11-28      Creatinine Trend: 8.50 <--, 7.84 <--, 6.68 <--, 6.43 <--, 5.05 <--, 6.50 <--                        7.2    6.01  )-----------( 194      ( 28 Nov 2020 03:30 )             24.3     Urine Studies:      RADIOLOGY & ADDITIONAL STUDIES:

## 2020-11-29 LAB
ANION GAP SERPL CALC-SCNC: 11 MMO/L — SIGNIFICANT CHANGE UP (ref 7–14)
BASOPHILS # BLD AUTO: 0.01 K/UL — SIGNIFICANT CHANGE UP (ref 0–0.2)
BASOPHILS NFR BLD AUTO: 0.2 % — SIGNIFICANT CHANGE UP (ref 0–2)
BUN SERPL-MCNC: 51 MG/DL — HIGH (ref 7–23)
CALCIUM SERPL-MCNC: 9 MG/DL — SIGNIFICANT CHANGE UP (ref 8.4–10.5)
CHLORIDE SERPL-SCNC: 93 MMOL/L — LOW (ref 98–107)
CO2 SERPL-SCNC: 31 MMOL/L — SIGNIFICANT CHANGE UP (ref 22–31)
CREAT SERPL-MCNC: 5.81 MG/DL — HIGH (ref 0.5–1.3)
CULTURE RESULTS: SIGNIFICANT CHANGE UP
EOSINOPHIL # BLD AUTO: 0.02 K/UL — SIGNIFICANT CHANGE UP (ref 0–0.5)
EOSINOPHIL NFR BLD AUTO: 0.4 % — SIGNIFICANT CHANGE UP (ref 0–6)
GLUCOSE SERPL-MCNC: 95 MG/DL — SIGNIFICANT CHANGE UP (ref 70–99)
HCT VFR BLD CALC: 25.3 % — LOW (ref 39–50)
HGB BLD-MCNC: 7.4 G/DL — LOW (ref 13–17)
IMM GRANULOCYTES NFR BLD AUTO: 0.2 % — SIGNIFICANT CHANGE UP (ref 0–1.5)
LYMPHOCYTES # BLD AUTO: 0.93 K/UL — LOW (ref 1–3.3)
LYMPHOCYTES # BLD AUTO: 20.4 % — SIGNIFICANT CHANGE UP (ref 13–44)
MAGNESIUM SERPL-MCNC: 2.5 MG/DL — SIGNIFICANT CHANGE UP (ref 1.6–2.6)
MCHC RBC-ENTMCNC: 24.2 PG — LOW (ref 27–34)
MCHC RBC-ENTMCNC: 29.2 % — LOW (ref 32–36)
MCV RBC AUTO: 82.7 FL — SIGNIFICANT CHANGE UP (ref 80–100)
MONOCYTES # BLD AUTO: 0.65 K/UL — SIGNIFICANT CHANGE UP (ref 0–0.9)
MONOCYTES NFR BLD AUTO: 14.3 % — HIGH (ref 2–14)
NEUTROPHILS # BLD AUTO: 2.93 K/UL — SIGNIFICANT CHANGE UP (ref 1.8–7.4)
NEUTROPHILS NFR BLD AUTO: 64.5 % — SIGNIFICANT CHANGE UP (ref 43–77)
NRBC # FLD: 0 K/UL — SIGNIFICANT CHANGE UP (ref 0–0)
PHOSPHATE SERPL-MCNC: 3 MG/DL — SIGNIFICANT CHANGE UP (ref 2.5–4.5)
PLATELET # BLD AUTO: 193 K/UL — SIGNIFICANT CHANGE UP (ref 150–400)
PMV BLD: 8.9 FL — SIGNIFICANT CHANGE UP (ref 7–13)
POTASSIUM SERPL-MCNC: 3.9 MMOL/L — SIGNIFICANT CHANGE UP (ref 3.5–5.3)
POTASSIUM SERPL-SCNC: 3.9 MMOL/L — SIGNIFICANT CHANGE UP (ref 3.5–5.3)
RBC # BLD: 3.06 M/UL — LOW (ref 4.2–5.8)
RBC # FLD: 23.1 % — HIGH (ref 10.3–14.5)
SODIUM SERPL-SCNC: 135 MMOL/L — SIGNIFICANT CHANGE UP (ref 135–145)
SPECIMEN SOURCE: SIGNIFICANT CHANGE UP
WBC # BLD: 4.55 K/UL — SIGNIFICANT CHANGE UP (ref 3.8–10.5)
WBC # FLD AUTO: 4.55 K/UL — SIGNIFICANT CHANGE UP (ref 3.8–10.5)

## 2020-11-29 PROCEDURE — 99232 SBSQ HOSP IP/OBS MODERATE 35: CPT

## 2020-11-29 RX ORDER — CHLORPROMAZINE HCL 10 MG
10 TABLET ORAL ONCE
Refills: 0 | Status: COMPLETED | OUTPATIENT
Start: 2020-11-29 | End: 2020-11-29

## 2020-11-29 RX ADMIN — Medication 30 MILLILITER(S): at 04:36

## 2020-11-29 RX ADMIN — SENNA PLUS 2 TABLET(S): 8.6 TABLET ORAL at 21:12

## 2020-11-29 RX ADMIN — HEPARIN SODIUM 5000 UNIT(S): 5000 INJECTION INTRAVENOUS; SUBCUTANEOUS at 13:24

## 2020-11-29 RX ADMIN — Medication 10 MILLIGRAM(S): at 17:20

## 2020-11-29 RX ADMIN — OXYCODONE HYDROCHLORIDE 5 MILLIGRAM(S): 5 TABLET ORAL at 22:12

## 2020-11-29 RX ADMIN — TIOTROPIUM BROMIDE 1 CAPSULE(S): 18 CAPSULE ORAL; RESPIRATORY (INHALATION) at 17:21

## 2020-11-29 RX ADMIN — HEPARIN SODIUM 5000 UNIT(S): 5000 INJECTION INTRAVENOUS; SUBCUTANEOUS at 05:23

## 2020-11-29 RX ADMIN — HEPARIN SODIUM 5000 UNIT(S): 5000 INJECTION INTRAVENOUS; SUBCUTANEOUS at 21:12

## 2020-11-29 RX ADMIN — Medication 30 MILLILITER(S): at 21:40

## 2020-11-29 RX ADMIN — CHLORHEXIDINE GLUCONATE 1 APPLICATION(S): 213 SOLUTION TOPICAL at 13:24

## 2020-11-29 RX ADMIN — OXYCODONE HYDROCHLORIDE 5 MILLIGRAM(S): 5 TABLET ORAL at 21:12

## 2020-11-29 NOTE — PROGRESS NOTE ADULT - PROBLEM SELECTOR PROBLEM 3
GROUP THERAPY PROGRESS NOTE    The patient Kay carias 27 y.o. male is participating in [employer]. Group time: 1 hour    Personal goal for participation:  To concentrate on selected task    Goal orientation: social    Group therapy participation: minimal    Therapeutic interventions reviewed and discussed: Crafts, games, music    Impression of participation: The patient was present-arrived late    Regina Duong  7/26/2019  6:13 PM Encephalopathy

## 2020-11-29 NOTE — PROGRESS NOTE ADULT - PROBLEM SELECTOR PLAN 1
initially with sepsis presumed source infected necrotic lymph node on admission   T 101.5, , Lactate 8 on admission. Possible source inc intraabdominal vs PNA  - completed Zosyn - 5 day course   - blood cultures- NGTD  -CT chest 11/28 small pleural effusion and atelectasis.   -ID following- monitor off abx for now. will start zosyn if febrile again.   -f/u cineesophagogram r/o dysphagia

## 2020-11-29 NOTE — PROGRESS NOTE ADULT - SUBJECTIVE AND OBJECTIVE BOX
[facility] Division of [facility] Medicine  Catrina Edwards MD  Pager [phone]      Patient is a 75y old  Male who presents with a chief complaint of AMS (28 Nov 2020 12:41)      SUBJECTIVE / OVERNIGHT EVENTS:    No fever o/n. Pt denies sob or pain. mild  cough. No new complaint     ADDITIONAL REVIEW OF SYSTEMS:    RESPIRATORY: + cough. No wheezing, chills or hemoptysis; No shortness of breath  CARDIOVASCULAR: No chest pain, palpitations, dizziness, or leg swelling  GASTROINTESTINAL: No abdominal or epigastric pain. No nausea, vomiting, or hematemesis; No diarrhea or constipation. No melena or hematochezia.      MEDICATIONS  (STANDING):  chlorhexidine 4% Liquid 1 Application(s) Topical <User Schedule>  epoetin katlyn-epbx (RETACRIT) Injectable 58466 Unit(s) IV Push <User Schedule>  heparin   Injectable 5000 Unit(s) SubCutaneous every 8 hours  polyethylene glycol 3350 17 Gram(s) Oral daily  senna 2 Tablet(s) Oral at bedtime  tiotropium 18 MICROgram(s) Capsule 1 Capsule(s) Inhalation daily    MEDICATIONS  (PRN):  ALBUTerol    90 MICROgram(s) HFA Inhaler 2 Puff(s) Inhalation every 6 hours PRN Shortness of Breath and/or Wheezing  aluminum hydroxide/magnesium hydroxide/simethicone Suspension 30 milliLiter(s) Oral every 4 hours PRN Dyspepsia  guaiFENesin   Syrup  (Sugar-Free) 200 milliGRAM(s) Oral every 6 hours PRN Cough  oxyCODONE    IR 5 milliGRAM(s) Oral every 6 hours PRN Moderate to severe pain      CAPILLARY BLOOD GLUCOSE        I&O's Summary    28 Nov 2020 07:01  -  29 Nov 2020 07:00  --------------------------------------------------------  IN: 400 mL / OUT: 1400 mL / NET: -1000 mL        PHYSICAL EXAM:  Vital Signs Last 24 Hrs  T(C): 36.8 (29 Nov 2020 05:22), Max: 36.8 (29 Nov 2020 05:22)  T(F): 98.2 (29 Nov 2020 05:22), Max: 98.2 (29 Nov 2020 05:22)  HR: 91 (29 Nov 2020 05:22) (81 - 99)  BP: 131/51 (29 Nov 2020 05:22) (116/51 - 144/59)  BP(mean): --  RR: 18 (29 Nov 2020 05:22) (18 - 18)  SpO2: 97% (29 Nov 2020 05:22) (96% - 97%)    CONSTITUTIONAL: NAD,   EYES: PERRLA; conjunctiva and sclera clear  ENMT: Moist oral mucosa, no pharyngeal injection or exudates;   NECK: Supple, no palpable masses;  RESPIRATORY: Normal respiratory effort; basilar crackles bilaterally  CARDIOVASCULAR: Regular rate and rhythm, normal S1 and S2, no murmur/rub/gallop; No lower extremity edema; Peripheral pulses are 2+ bilaterally  ABDOMEN: Nontender to palpation, normoactive bowel sounds, no rebound/guarding;  MUSCLOSKELETAL:  ; no clubbing or cyanosis of digits; no joint swelling or tenderness to palpation  PSYCH: A+O to person, place,  affect appropriate  NEUROLOGY: CN 2-12 are intact and symmetric; no gross sensory deficits;   SKIN: No rashes; no palpable lesions      LABS:                        7.4    4.55  )-----------( 193      ( 29 Nov 2020 05:53 )             25.3     11-29    135  |  93<L>  |  51<H>  ----------------------------<  95  3.9   |  31  |  5.81<H>    Ca    9.0      29 Nov 2020 05:53  Phos  3.0     11-29  Mg     2.5     11-29                Culture - Sputum (collected 28 Nov 2020 00:35)  Source: .Sputum Sputum  Gram Stain (28 Nov 2020 02:32):    Rare polymorphonuclear leukocytes per low power field    Few Squamous epithelial cells per low power field    No organisms seen per oil power field  Preliminary Report (28 Nov 2020 19:29):    Normal Respiratory Josselin present        RADIOLOGY & ADDITIONAL TESTS:  Results Reviewed:   Imaging Personally Reviewed:  Electrocardiogram Personally Reviewed:    COORDINATION OF CARE:  Care Discussed with Consultants/Other Providers [Y/N]:  Prior or Outpatient Records Reviewed [Y/N]:

## 2020-11-29 NOTE — PROGRESS NOTE ADULT - ASSESSMENT
75 M PMHx ESRD on HD (TTS), emphysema, renal cancer (diagnosed 3 months ago) on sunitinib, HTN, HLD presented to ED with metabolic encephalopathy d/t sepsis possibly from necrotic lymph s/p course of zosyn, course c/b recurrent fever. sepsis possibly d/t LLL PNA

## 2020-11-30 LAB
ANION GAP SERPL CALC-SCNC: 14 MMO/L — SIGNIFICANT CHANGE UP (ref 7–14)
BASOPHILS # BLD AUTO: 0.01 K/UL — SIGNIFICANT CHANGE UP (ref 0–0.2)
BASOPHILS NFR BLD AUTO: 0.2 % — SIGNIFICANT CHANGE UP (ref 0–2)
BUN SERPL-MCNC: 69 MG/DL — HIGH (ref 7–23)
CALCIUM SERPL-MCNC: 9 MG/DL — SIGNIFICANT CHANGE UP (ref 8.4–10.5)
CHLORIDE SERPL-SCNC: 91 MMOL/L — LOW (ref 98–107)
CO2 SERPL-SCNC: 29 MMOL/L — SIGNIFICANT CHANGE UP (ref 22–31)
CREAT SERPL-MCNC: 7.01 MG/DL — HIGH (ref 0.5–1.3)
EOSINOPHIL # BLD AUTO: 0 K/UL — SIGNIFICANT CHANGE UP (ref 0–0.5)
EOSINOPHIL NFR BLD AUTO: 0 % — SIGNIFICANT CHANGE UP (ref 0–6)
GLUCOSE SERPL-MCNC: 88 MG/DL — SIGNIFICANT CHANGE UP (ref 70–99)
HCT VFR BLD CALC: 23.2 % — LOW (ref 39–50)
HGB BLD-MCNC: 6.8 G/DL — CRITICAL LOW (ref 13–17)
IMM GRANULOCYTES NFR BLD AUTO: 0.2 % — SIGNIFICANT CHANGE UP (ref 0–1.5)
LYMPHOCYTES # BLD AUTO: 1.11 K/UL — SIGNIFICANT CHANGE UP (ref 1–3.3)
LYMPHOCYTES # BLD AUTO: 25.2 % — SIGNIFICANT CHANGE UP (ref 13–44)
MAGNESIUM SERPL-MCNC: 2.8 MG/DL — HIGH (ref 1.6–2.6)
MCHC RBC-ENTMCNC: 24.8 PG — LOW (ref 27–34)
MCHC RBC-ENTMCNC: 29.3 % — LOW (ref 32–36)
MCV RBC AUTO: 84.7 FL — SIGNIFICANT CHANGE UP (ref 80–100)
MONOCYTES # BLD AUTO: 0.63 K/UL — SIGNIFICANT CHANGE UP (ref 0–0.9)
MONOCYTES NFR BLD AUTO: 14.3 % — HIGH (ref 2–14)
NEUTROPHILS # BLD AUTO: 2.65 K/UL — SIGNIFICANT CHANGE UP (ref 1.8–7.4)
NEUTROPHILS NFR BLD AUTO: 60.1 % — SIGNIFICANT CHANGE UP (ref 43–77)
NRBC # FLD: 0 K/UL — SIGNIFICANT CHANGE UP (ref 0–0)
PHOSPHATE SERPL-MCNC: 3.2 MG/DL — SIGNIFICANT CHANGE UP (ref 2.5–4.5)
PLATELET # BLD AUTO: 203 K/UL — SIGNIFICANT CHANGE UP (ref 150–400)
PMV BLD: 10.1 FL — SIGNIFICANT CHANGE UP (ref 7–13)
POTASSIUM SERPL-MCNC: 4.4 MMOL/L — SIGNIFICANT CHANGE UP (ref 3.5–5.3)
POTASSIUM SERPL-SCNC: 4.4 MMOL/L — SIGNIFICANT CHANGE UP (ref 3.5–5.3)
RBC # BLD: 2.74 M/UL — LOW (ref 4.2–5.8)
RBC # FLD: 23.1 % — HIGH (ref 10.3–14.5)
SARS-COV-2 RNA SPEC QL NAA+PROBE: SIGNIFICANT CHANGE UP
SODIUM SERPL-SCNC: 134 MMOL/L — LOW (ref 135–145)
WBC # BLD: 4.41 K/UL — SIGNIFICANT CHANGE UP (ref 3.8–10.5)
WBC # FLD AUTO: 4.41 K/UL — SIGNIFICANT CHANGE UP (ref 3.8–10.5)

## 2020-11-30 PROCEDURE — 99232 SBSQ HOSP IP/OBS MODERATE 35: CPT

## 2020-11-30 PROCEDURE — 74230 X-RAY XM SWLNG FUNCJ C+: CPT | Mod: 26

## 2020-11-30 PROCEDURE — 99233 SBSQ HOSP IP/OBS HIGH 50: CPT

## 2020-11-30 RX ORDER — ERYTHROPOIETIN 10000 [IU]/ML
16000 INJECTION, SOLUTION INTRAVENOUS; SUBCUTANEOUS
Refills: 0 | Status: DISCONTINUED | OUTPATIENT
Start: 2020-11-30 | End: 2020-12-01

## 2020-11-30 RX ORDER — CHLORPROMAZINE HCL 10 MG
10 TABLET ORAL ONCE
Refills: 0 | Status: COMPLETED | OUTPATIENT
Start: 2020-11-30 | End: 2020-11-30

## 2020-11-30 RX ADMIN — Medication 10 MILLIGRAM(S): at 05:06

## 2020-11-30 RX ADMIN — TIOTROPIUM BROMIDE 1 CAPSULE(S): 18 CAPSULE ORAL; RESPIRATORY (INHALATION) at 14:34

## 2020-11-30 RX ADMIN — SENNA PLUS 2 TABLET(S): 8.6 TABLET ORAL at 21:50

## 2020-11-30 RX ADMIN — HEPARIN SODIUM 5000 UNIT(S): 5000 INJECTION INTRAVENOUS; SUBCUTANEOUS at 14:46

## 2020-11-30 RX ADMIN — HEPARIN SODIUM 5000 UNIT(S): 5000 INJECTION INTRAVENOUS; SUBCUTANEOUS at 05:06

## 2020-11-30 RX ADMIN — HEPARIN SODIUM 5000 UNIT(S): 5000 INJECTION INTRAVENOUS; SUBCUTANEOUS at 21:50

## 2020-11-30 RX ADMIN — CHLORHEXIDINE GLUCONATE 1 APPLICATION(S): 213 SOLUTION TOPICAL at 12:46

## 2020-11-30 NOTE — SWALLOW VFSS/MBS ASSESSMENT ADULT - DIAGNOSTIC IMPRESSIONS
Patient presents with Moderate to Severe Oropharyngeal Dysphagia. The Oral Phase was marked by adequate stripping of bolus from utensil, adequate oral containment, extended mastication for 1/4 inch regular solid with slow bolus manipulation, slow anterior to posterior transport and incomplete tongue to palate seal with premature loss for Thin Liquids. Reduced oral clearance across trials marked by diffuse trace lingual residue post primary swallow. The Pharyngeal Phase was marked by delay in initiation of pharyngeal swallow (head of bolus at level of valleculae for Thin Liquids), reduced laryngeal elevation, reduced base of tongue retraction resulting in mild residue in valleculae post primary swallow, reduced epiglottic deflection, reduced laryngeal vestibular closure and reduced pharyngeal contractility resulting in mild residue in pyriforms post primary swallow. Patient benefits from prompted secondary re-swallow to assist with oropharyngeal clearance. There was deep SILENT laryngeal penetration during the swallow with migration to the level of the vocal cords without retrieval for Thin Liquids. Reduced laryngeal sensation given no reflexive cough response to deep laryngeal penetration. Postural/Compensatory Strategies not attempted given patient unable to consistently follow simple/ multi-step directions. There was no laryngeal penetration/aspiration observed before, during or after the swallow for puree consistency, 1/4 inch regular solids, honey thick liquids and nectar thick liquids. Patient presents with Moderate to Severe Oropharyngeal Dysphagia. The Oral Phase was marked by adequate stripping of bolus from utensil, adequate oral containment, extended mastication for 1/4 inch regular solid with slow bolus manipulation, slow anterior to posterior transport and incomplete tongue to palate seal with premature loss for Thin Liquids. Reduced oral clearance across trials marked by diffuse trace lingual residue post primary swallow. The Pharyngeal Phase was marked by delay in initiation of pharyngeal swallow (head of bolus at level of valleculae for Thin Liquids), reduced laryngeal elevation, reduced base of tongue retraction resulting in mild residue in valleculae post primary swallow, reduced epiglottic deflection, reduced laryngeal vestibular closure and reduced pharyngeal contractility resulting in mild residue in pyriforms post primary swallow. Patient benefits from prompted secondary re-swallow to assist with oropharyngeal clearance. There was deep SILENT laryngeal penetration during the swallow with migration to the level of the vocal cords without retrieval for Thin Liquids. Reduced laryngeal sensation given no reflexive cough response to deep laryngeal penetration. Postural/Compensatory Strategies not attempted given patient unable to consistently follow simple/ multi-step directions. There was no laryngeal penetration/aspiration observed before, during or after the swallow for puree consistency, 1/4 inch regular solids, honey thick liquids and nectar thick liquids.    Of Note, there was an incidental anatomical finding of a questionable posterior diverticulum at level of C2-C3 (as per Radiologist) that does not negatively impact bolus passage.

## 2020-11-30 NOTE — SWALLOW VFSS/MBS ASSESSMENT ADULT - RECOMMENDED CONSISTENCY
1. Mechanical Soft Solids and Nectar Thick Liquids  2. Secondary Re-Swallow After Each Sip/Bite  3. Follow safe swallow guidelines including small/single sips and bites, alternate liquids and solids, slow rate of intake, upright position during eating/drinking, maintain upright position 30 minutes post eating/drinking and maintain good oral hygiene

## 2020-11-30 NOTE — PROGRESS NOTE ADULT - PROBLEM SELECTOR PLAN 6
Off of antihypertensives  Blood pressure will be monitored and if needed medications will be adjusted accordingly

## 2020-11-30 NOTE — SWALLOW VFSS/MBS ASSESSMENT ADULT - ORAL PHASE
Residue in oral cavity/Reduced anterior - posterior transport/Delayed oral transit time Delayed oral transit time/Reduced anterior - posterior transport/Residue in oral cavity Incomplete tongue to palate contact/Uncontrolled bolus / spillover in hypopharynx

## 2020-11-30 NOTE — PROGRESS NOTE ADULT - SUBJECTIVE AND OBJECTIVE BOX
Jarocho Mckeon MD  [employer]  Pager [phone]  Email: [email or web address]          PROGRESS NOTE:     Patient is a 75y old  Male who presents with a chief complaint of AMS (29 Nov 2020 12:44)      SUBJECTIVE / OVERNIGHT EVENTS:  Patient seen and examined this morning. No acute events overnight. Denies f/c/n/v. Case and plan discussed with the patient's sister       MEDICATIONS  (STANDING):  chlorhexidine 4% Liquid 1 Application(s) Topical <User Schedule>  epoetin katlyn-epbx (RETACRIT) Injectable 49246 Unit(s) IV Push <User Schedule>  heparin   Injectable 5000 Unit(s) SubCutaneous every 8 hours  polyethylene glycol 3350 17 Gram(s) Oral daily  senna 2 Tablet(s) Oral at bedtime  tiotropium 18 MICROgram(s) Capsule 1 Capsule(s) Inhalation daily    MEDICATIONS  (PRN):  ALBUTerol    90 MICROgram(s) HFA Inhaler 2 Puff(s) Inhalation every 6 hours PRN Shortness of Breath and/or Wheezing  aluminum hydroxide/magnesium hydroxide/simethicone Suspension 30 milliLiter(s) Oral every 4 hours PRN Dyspepsia  guaiFENesin   Syrup  (Sugar-Free) 200 milliGRAM(s) Oral every 6 hours PRN Cough  oxyCODONE    IR 5 milliGRAM(s) Oral every 6 hours PRN Moderate to severe pain      CAPILLARY BLOOD GLUCOSE        I&O's Summary      PHYSICAL EXAM:  Vital Signs Last 24 Hrs  T(C): 36.6 (30 Nov 2020 12:25), Max: 36.8 (30 Nov 2020 05:05)  T(F): 97.9 (30 Nov 2020 12:25), Max: 98.2 (30 Nov 2020 05:05)  HR: 80 (30 Nov 2020 12:25) (80 - 85)  BP: 132/48 (30 Nov 2020 12:25) (117/56 - 132/50)  BP(mean): --  RR: 16 (30 Nov 2020 12:25) (16 - 17)  SpO2: 100% (30 Nov 2020 12:25) (96% - 100%)    CONSTITUTIONAL: NAD, well-developed, cachectic  RESPIRATORY: Normal respiratory effort; lungs are clear to auscultation bilaterally  CARDIOVASCULAR: Regular rate and rhythm, normal S1 and S2, no murmur/rub/gallop; No lower extremity edema; Peripheral pulses are 2+ bilaterally  ABDOMEN: Nontender to palpation, normoactive bowel sounds, no rebound/guarding;  MUSCLOSKELETAL: no clubbing or cyanosis of digits; no joint swelling or tenderness to palpation  PSYCH: A+O to person, place, and time; affect appropriate    LABS:                        6.8    4.41  )-----------( 203      ( 30 Nov 2020 04:50 )             23.2     11-30    134<L>  |  91<L>  |  69<H>  ----------------------------<  88  4.4   |  29  |  7.01<H>    Ca    9.0      30 Nov 2020 04:50  Phos  3.2     11-30  Mg     2.8     11-30                Culture - Sputum (collected 27 Nov 2020 18:49)  Source: .Sputum Sputum  Gram Stain (28 Nov 2020 02:32):    Rare polymorphonuclear leukocytes per low power field    Few Squamous epithelial cells per low power field    No organisms seen per oil power field  Final Report (29 Nov 2020 17:34):    Normal Respiratory Josselin present        RADIOLOGY & ADDITIONAL TESTS:  Results Reviewed:   Imaging Personally Reviewed:  Electrocardiogram Personally Reviewed:    COORDINATION OF CARE:  Care Discussed with Consultants/Other Providers [Y/N]:  Prior or Outpatient Records Reviewed [Y/N]:

## 2020-11-30 NOTE — PROGRESS NOTE ADULT - ATTENDING COMMENTS
King Ibarra  Pager: [phone]. If no response or past 5 pm call [phone].     Will sign off, please call with questions.

## 2020-11-30 NOTE — SWALLOW VFSS/MBS ASSESSMENT ADULT - PHARYNGEAL PHASE COMMENTS
Adequate initiation of pharyngeal swallow, adequate laryngeal elevation, reduced base of tongue retraction, adequate epiglottic deflection, adequate laryngeal vestibular closure and reduced pharyngeal contractility. Delay in initiation of pharyngeal swallow, reduced laryngeal elevation, reduced base of tongue retraction, reduced epiglottic deflection, reduced laryngeal vestibular closure and reduced pharyngeal contractility.

## 2020-11-30 NOTE — PROGRESS NOTE ADULT - PROBLEM SELECTOR PLAN 4
Collateral information obtained from patient's oncologist Dr. 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 He had CT imaging consistent with liver metastases at the time  -Patient was prescribed Sutinib in July 2020, which he had trouble obtaining due to its cost- had poor follow up and was not seen again in the office until September when cost was addressed- did not start Sutinib until October 11th  -Previous imaging showed sclerotic lesion in T8, with possible other bony metastases- an MRI of the spine was ordered, which patient did not get done  -Patient 1 month ago complained of worsening dizziness, MRI head was ordered to r/o metastatic disease, which patient did not get done  -Per Dr. Castillo, palliative care is appropriate, although it is hard to say if this is progression of disease on current chemotherapy given questionable patient compliance and delay in starting treatment since initial scans were done  -DCd MR spine and head as with ESRD and need for Riley - prior hospitalist d/w renal, would need 3 sessions of HD to mitigate risk of NSF and then there is still possibility of adverse outcome and unsure if pt can tolerate back to back HD sessions with clinical status  - considering no plans for further DMT, MR imaging would not  - can be considered as out-pt as for staging purposes if GOC change   -Hold Sutinib in setting of infection

## 2020-11-30 NOTE — SWALLOW VFSS/MBS ASSESSMENT ADULT - COMMENTS
Progress Note 11/29/20: 75 M PMHx ESRD on HD (TTS), emphysema, renal cancer (diagnosed 3 months ago) on sunitinib, HTN, HLD presented to ED with metabolic encephalopathy d/t sepsis possibly from necrotic lymph s/p course of zosyn, course c/b recurrent fever. sepsis possibly d/t LLL PNA    Clinical bedside swallow evaluation completed on 11/27/20 (see note for details).     Patient was seen seated upright in specialized chair with Radiologist present. Patient was alert/awake and cooperative for assessment.

## 2020-11-30 NOTE — SWALLOW VFSS/MBS ASSESSMENT ADULT - NS SWALLOW VFSS REC ASPIR MON
throat clearing/position upright (90Y)/cough/gurgly voice/pneumonia/change of breathing pattern/oral hygiene/upper respiratory infection/fever

## 2020-11-30 NOTE — PROGRESS NOTE ADULT - ASSESSMENT
Assessment:  75 year old man PMHx ESRD on HD (TTS), emphysema, renal cancer (diagnosed 3 months ago) on sunitinib, hypertension, hyperlipidemia, presented to ED with sepsis and acute encephalopathy on 11/18, now resolved. Also with abd pain suspected due to mets.   Received zosyn x 5 days for possible superimposed kidney infection.   Planned for discharge when developed fever today to 100.4    Fever - Differential includes aspiration vs transient bacteremia from the abd vs tumor fever. He completed Zosyn initially x 5day from 11/18-23 and bl clx were negative at the time   Resolved fever, clinically not behaving like a pneumonia.   Afebrile, no leucocytosis, saturating well. No hypotension.       Overall fever, tachycardia, hypotension at presentation, abnormal cxr   resolved symptoms.       Plan:  BP stable at this time off antimicrobials   No fever or leucocytosis   blood cx NTD   monitoring off abx for now,   CT chest with no pneumonia

## 2020-11-30 NOTE — PROGRESS NOTE ADULT - ASSESSMENT
75M PMHx ESRD on HD (TTS), emphysema, renal cancer (diagnosed 3 months ago) on sunitinib, HTN, HLD presented to ED with sepsis and AMS.  Renal following for ESRD Mx.     ESRD (end stage renal disease) on dialysis.   Maintenance HD schedule TTS  Electrolytes and volume status acceptable.               plan for next routine HD tomorrow, low UF goals  renal restrictions in diet  dose all meds for ESRD    HTN, bp well controlled. stable. not on meds  Anemia in CKD- Hg <goal. inc epo 10>16k w/hd tiw. Hb now <7 transfuse 1unit prbc over 4hrs. clinically not in CHF  AMS -resolved. pt at baseline now  LLL PNA on CXR- afebrile. monitor off abx, as per ID. dose for ESRD.    labs, chart reviewed

## 2020-11-30 NOTE — PROGRESS NOTE ADULT - PROBLEM SELECTOR PLAN 8
DVT PPx: HSQ  Diet- renal replacement - change to dysphagia P swallow eval   Dispo: PT recommending home with home PT. D/w patient's sister Paolo on 11/30, patient not compliant with medical follow up and does not seem able to care for himself at home. Palliative care team and CM following for dispo planning, now now planned for LTC placement as pt wishes to cw HD making hospice not an option.

## 2020-11-30 NOTE — PROGRESS NOTE ADULT - PROBLEM SELECTOR PLAN 1
initially with sepsis presumed source infected necrotic lymph node on admission   T 101.5, , Lactate 8 on admission. Possible source inc intraabdominal vs PNA  - completed Zosyn - 5 day course   - blood cultures- NGTD  -CT chest 11/28 small pleural effusion and atelectasis.   -ID following- monitor off abx for now. will start zosyn if febrile again.   -cineesophagogram completed, diet restarted

## 2020-11-30 NOTE — PROGRESS NOTE ADULT - SUBJECTIVE AND OBJECTIVE BOX
[facility] - S Monico / Mayi S /D Terra/ S Chanel/ S Gwendolyn/ Allan Ortiz / M Bernardau/ O Fang  service -[phone], office [phone]  ---------------------------------------------------------------------------------------------------------------    Patient seen and examined bedside    Subjective and Objective: No overnight events, denied cp/sob. No complaints today.     Allergies: No Known Allergies      Hospital Medications:   MEDICATIONS  (STANDING):  chlorhexidine 4% Liquid 1 Application(s) Topical <User Schedule>  epoetin katlyn-epbx (RETACRIT) Injectable 30790 Unit(s) IV Push <User Schedule>  heparin   Injectable 5000 Unit(s) SubCutaneous every 8 hours  polyethylene glycol 3350 17 Gram(s) Oral daily  senna 2 Tablet(s) Oral at bedtime  tiotropium 18 MICROgram(s) Capsule 1 Capsule(s) Inhalation daily      VITALS:  T(F): 97.9 (11-30-20 @ 12:25), Max: 98.2 (11-30-20 @ 05:05)  HR: 80 (11-30-20 @ 12:25)  BP: 132/48 (11-30-20 @ 12:25)  RR: 16 (11-30-20 @ 12:25)  SpO2: 100% (11-30-20 @ 12:25)  Wt(kg): --    PHYSICAL EXAM:  Constitutional: NAD  HEENT: anicteric sclera  Neck: No JVD  Respiratory: CTAB, no wheezes, rales or rhonchi  Cardiovascular: S1, S2, RRR  Gastrointestinal: BS+, soft,  Extremities: No peripheral edema  Neurological: A/O x 3  Psychiatric: Normal mood, normal affect  : No wheatley.   Vascular Access: UE AV access +thrill and bruit.     LABS:  11-30    134<L>  |  91<L>  |  69<H>  ----------------------------<  88  4.4   |  29  |  7.01<H>    Ca    9.0      30 Nov 2020 04:50  Phos  3.2     11-30  Mg     2.8     11-30      Creatinine Trend: 7.01 <--, 5.81 <--, 8.50 <--, 7.84 <--, 6.68 <--, 6.43 <--                        6.8    4.41  )-----------( 203      ( 30 Nov 2020 04:50 )             23.2     Urine Studies:        RADIOLOGY & ADDITIONAL STUDIES:

## 2020-11-30 NOTE — PROGRESS NOTE ADULT - PROBLEM SELECTOR PLAN 7
Likely anemia of chronic disease given renal failure and malignancy. No active bleeding noted  -cont to monitor  -on epo with HD  - Maintain active type and screen  - Folate and Vitamin B12 levels WNL  - Transfuse 1/2 unit today

## 2020-11-30 NOTE — PROGRESS NOTE ADULT - SUBJECTIVE AND OBJECTIVE BOX
75y old  Male who presents with a chief complaint of AMS (30 Nov 2020 14:06)      Interval history:  Afebrile, receiving blood transfusion, no new complains     Allergies:   No Known Allergies      Antimicrobials:      REVIEW OF SYSTEMS:  No chest pain   Some cough,   No N/V, chronic abdominal pain  No rash.     Vital Signs Last 24 Hrs  T(C): 36.6 (11-30-20 @ 12:25), Max: 36.8 (11-30-20 @ 05:05)  T(F): 97.9 (11-30-20 @ 12:25), Max: 98.2 (11-30-20 @ 05:05)  HR: 80 (11-30-20 @ 12:25) (80 - 85)  BP: 132/48 (11-30-20 @ 12:25) (117/56 - 132/50)  BP(mean): --  RR: 16 (11-30-20 @ 12:25) (16 - 17)  SpO2: 100% (11-30-20 @ 12:25) (96% - 100%)      PHYSICAL EXAM:  Patient in no acute distress. Alert, awake. talking on the phone   rt arm fistula   Cardiovascular: S1S2 normal.  Lungs: + air entry B/L lung fields,   Gastrointestinal: soft, mild discomfort on palpation, nondistended.  Extremities: no edema.  IV sites not inflamed.                           6.8    4.41  )-----------( 203      ( 30 Nov 2020 04:50 )             23.2   11-30    134<L>  |  91<L>  |  69<H>  ----------------------------<  88  4.4   |  29  |  7.01<H>    Ca    9.0      30 Nov 2020 04:50  Phos  3.2     11-30  Mg     2.8     11-30        Culture - Sputum (collected 27 Nov 2020 18:49)  Source: .Sputum Sputum  Gram Stain (28 Nov 2020 02:32):    Rare polymorphonuclear leukocytes per low power field    Few Squamous epithelial cells per low power field    No organisms seen per oil power field  Final Report (29 Nov 2020 17:34):    Normal Respiratory Josselin present      Radiology:  < from: CT Chest No Cont (11.28.20 @ 20:09) >  IMPRESSION:  1.  Small bilateral pleural effusions and bibasilar atelectasis.  2.  New lytic lesions throughout the spine concerning for osseous metastasis.  3.  The retrocaval lymph node and renal lesions are better characterized on 11/27/2020.  4.  The hepatic lesions are bettercharacterized on 11/17/2020 and are concerning for metastasis.

## 2020-12-01 VITALS
RESPIRATION RATE: 18 BRPM | SYSTOLIC BLOOD PRESSURE: 144 MMHG | HEART RATE: 96 BPM | TEMPERATURE: 98 F | DIASTOLIC BLOOD PRESSURE: 42 MMHG | OXYGEN SATURATION: 97 %

## 2020-12-01 LAB
ANION GAP SERPL CALC-SCNC: 16 MMO/L — HIGH (ref 7–14)
BLD GP AB SCN SERPL QL: NEGATIVE — SIGNIFICANT CHANGE UP
BUN SERPL-MCNC: 81 MG/DL — HIGH (ref 7–23)
CALCIUM SERPL-MCNC: 9 MG/DL — SIGNIFICANT CHANGE UP (ref 8.4–10.5)
CHLORIDE SERPL-SCNC: 93 MMOL/L — LOW (ref 98–107)
CO2 SERPL-SCNC: 27 MMOL/L — SIGNIFICANT CHANGE UP (ref 22–31)
CREAT SERPL-MCNC: 9.01 MG/DL — HIGH (ref 0.5–1.3)
CULTURE RESULTS: SIGNIFICANT CHANGE UP
CULTURE RESULTS: SIGNIFICANT CHANGE UP
GLUCOSE SERPL-MCNC: 86 MG/DL — SIGNIFICANT CHANGE UP (ref 70–99)
HCT VFR BLD CALC: 24.7 % — LOW (ref 39–50)
HGB BLD-MCNC: 7.4 G/DL — LOW (ref 13–17)
MAGNESIUM SERPL-MCNC: 3 MG/DL — HIGH (ref 1.6–2.6)
MCHC RBC-ENTMCNC: 24.7 PG — LOW (ref 27–34)
MCHC RBC-ENTMCNC: 30 % — LOW (ref 32–36)
MCV RBC AUTO: 82.6 FL — SIGNIFICANT CHANGE UP (ref 80–100)
NRBC # FLD: 0 K/UL — SIGNIFICANT CHANGE UP (ref 0–0)
PHOSPHATE SERPL-MCNC: 3.5 MG/DL — SIGNIFICANT CHANGE UP (ref 2.5–4.5)
PLATELET # BLD AUTO: 260 K/UL — SIGNIFICANT CHANGE UP (ref 150–400)
PMV BLD: 9.5 FL — SIGNIFICANT CHANGE UP (ref 7–13)
POTASSIUM SERPL-MCNC: 4.7 MMOL/L — SIGNIFICANT CHANGE UP (ref 3.5–5.3)
POTASSIUM SERPL-SCNC: 4.7 MMOL/L — SIGNIFICANT CHANGE UP (ref 3.5–5.3)
RBC # BLD: 2.99 M/UL — LOW (ref 4.2–5.8)
RBC # FLD: 22.3 % — HIGH (ref 10.3–14.5)
RH IG SCN BLD-IMP: POSITIVE — SIGNIFICANT CHANGE UP
SODIUM SERPL-SCNC: 136 MMOL/L — SIGNIFICANT CHANGE UP (ref 135–145)
SPECIMEN SOURCE: SIGNIFICANT CHANGE UP
SPECIMEN SOURCE: SIGNIFICANT CHANGE UP
WBC # BLD: 4.12 K/UL — SIGNIFICANT CHANGE UP (ref 3.8–10.5)
WBC # FLD AUTO: 4.12 K/UL — SIGNIFICANT CHANGE UP (ref 3.8–10.5)

## 2020-12-01 PROCEDURE — 99239 HOSP IP/OBS DSCHRG MGMT >30: CPT

## 2020-12-01 RX ORDER — ERYTHROPOIETIN 10000 [IU]/ML
16000 INJECTION, SOLUTION INTRAVENOUS; SUBCUTANEOUS
Qty: 0 | Refills: 0 | DISCHARGE
Start: 2020-12-01

## 2020-12-01 RX ORDER — GABAPENTIN 400 MG/1
1 CAPSULE ORAL
Qty: 0 | Refills: 0 | DISCHARGE

## 2020-12-01 RX ADMIN — CHLORHEXIDINE GLUCONATE 1 APPLICATION(S): 213 SOLUTION TOPICAL at 12:47

## 2020-12-01 RX ADMIN — ERYTHROPOIETIN 16000 UNIT(S): 10000 INJECTION, SOLUTION INTRAVENOUS; SUBCUTANEOUS at 09:49

## 2020-12-01 RX ADMIN — Medication 30 MILLILITER(S): at 05:04

## 2020-12-01 RX ADMIN — HEPARIN SODIUM 5000 UNIT(S): 5000 INJECTION INTRAVENOUS; SUBCUTANEOUS at 05:04

## 2020-12-01 RX ADMIN — HEPARIN SODIUM 5000 UNIT(S): 5000 INJECTION INTRAVENOUS; SUBCUTANEOUS at 12:47

## 2020-12-01 RX ADMIN — TIOTROPIUM BROMIDE 1 CAPSULE(S): 18 CAPSULE ORAL; RESPIRATORY (INHALATION) at 12:47

## 2020-12-01 NOTE — PROGRESS NOTE ADULT - SUBJECTIVE AND OBJECTIVE BOX
Jarocho Mckeon MD  [employer] Hospitalist  Pager [phone]  Email: [email or web address]          PROGRESS NOTE:     Patient is a 75y old  Male who presents with a chief complaint of AMS (30 Nov 2020 16:09)      SUBJECTIVE / OVERNIGHT EVENTS:  Patient seen and examined this morning. Denies shortness of breath f/c/n/v.  ADDITIONAL REVIEW OF SYSTEMS:    MEDICATIONS  (STANDING):  chlorhexidine 4% Liquid 1 Application(s) Topical <User Schedule>  epoetin katlyn-epbx (RETACRIT) Injectable 58195 Unit(s) IV Push <User Schedule>  heparin   Injectable 5000 Unit(s) SubCutaneous every 8 hours  polyethylene glycol 3350 17 Gram(s) Oral daily  senna 2 Tablet(s) Oral at bedtime  tiotropium 18 MICROgram(s) Capsule 1 Capsule(s) Inhalation daily    MEDICATIONS  (PRN):  ALBUTerol    90 MICROgram(s) HFA Inhaler 2 Puff(s) Inhalation every 6 hours PRN Shortness of Breath and/or Wheezing  aluminum hydroxide/magnesium hydroxide/simethicone Suspension 30 milliLiter(s) Oral every 4 hours PRN Dyspepsia  oxyCODONE    IR 5 milliGRAM(s) Oral every 6 hours PRN Moderate to severe pain      CAPILLARY BLOOD GLUCOSE        I&O's Summary    01 Dec 2020 07:01  -  01 Dec 2020 13:23  --------------------------------------------------------  IN: 750 mL / OUT: 1700 mL / NET: -950 mL        PHYSICAL EXAM:  Vital Signs Last 24 Hrs  T(C): 36.4 (01 Dec 2020 12:18), Max: 37 (30 Nov 2020 22:03)  T(F): 97.6 (01 Dec 2020 12:18), Max: 98.6 (30 Nov 2020 22:03)  HR: 96 (01 Dec 2020 12:18) (82 - 96)  BP: 144/42 (01 Dec 2020 12:18) (113/50 - 147/50)  BP(mean): --  RR: 18 (01 Dec 2020 12:18) (16 - 18)  SpO2: 97% (01 Dec 2020 12:18) (96% - 99%)    CONSTITUTIONAL: NAD, well-developed, cachectic  RESPIRATORY: Normal respiratory effort; lungs are clear to auscultation bilaterally  CARDIOVASCULAR: Regular rate and rhythm, normal S1 and S2, no murmur/rub/gallop; No lower extremity edema; Peripheral pulses are 2+ bilaterally  ABDOMEN: Nontender to palpation, normoactive bowel sounds, no rebound/guarding;  MUSCLOSKELETAL: no clubbing or cyanosis of digits; no joint swelling or tenderness to palpation  PSYCH: A+O to person, place, and time; affect appropriate      LABS:                        7.4    4.12  )-----------( 260      ( 01 Dec 2020 06:14 )             24.7     12-01    136  |  93<L>  |  81<H>  ----------------------------<  86  4.7   |  27  |  9.01<H>    Ca    9.0      01 Dec 2020 06:14  Phos  3.5     12-01  Mg     3.0     12-01                  RADIOLOGY & ADDITIONAL TESTS:  Results Reviewed:   Imaging Personally Reviewed:  Electrocardiogram Personally Reviewed:    COORDINATION OF CARE:  Care Discussed with Consultants/Other Providers [Y/N]:  Prior or Outpatient Records Reviewed [Y/N]:

## 2020-12-01 NOTE — PROGRESS NOTE ADULT - SUBJECTIVE AND OBJECTIVE BOX
Nephrology Followup Note - [phone] - Dr See / Dr Marc / Dr Snow / Dr Ellison / Dr Buchanan / Dr Headley / Dr Ortiz / Dr Jamison  Pt seen and examined at bedside  No acute events overnight. No complaints.     Allergies:  No Known Allergies    Hospital Medications:   MEDICATIONS  (STANDING):  chlorhexidine 4% Liquid 1 Application(s) Topical <User Schedule>  epoetin katlyn-epbx (RETACRIT) Injectable 81005 Unit(s) IV Push <User Schedule>  heparin   Injectable 5000 Unit(s) SubCutaneous every 8 hours  polyethylene glycol 3350 17 Gram(s) Oral daily  senna 2 Tablet(s) Oral at bedtime  tiotropium 18 MICROgram(s) Capsule 1 Capsule(s) Inhalation daily    VITALS:  T(F): 97.6 (12-01-20 @ 12:18), Max: 98.6 (11-30-20 @ 22:03)  HR: 96 (12-01-20 @ 12:18)  BP: 144/42 (12-01-20 @ 12:18)  RR: 18 (12-01-20 @ 12:18)  SpO2: 97% (12-01-20 @ 12:18)  Wt(kg): --    12-01 @ 07:01  -  12-01 @ 14:45  --------------------------------------------------------  IN: 750 mL / OUT: 1700 mL / NET: -950 mL        PHYSICAL EXAM:  Constitutional: NAD  HEENT: anicteric sclera, oropharynx clear, MMM  Neck: No JVD  Respiratory: CTAB, no wheezes, rales or rhonchi  Cardiovascular: S1, S2, RRR  Gastrointestinal: BS+, soft, NT/ND  Extremities: No cyanosis or clubbing. No peripheral edema  Neurological: A/O x 3, no focal deficits  Psychiatric: Normal mood, normal affect  : No CVA tenderness. No wheatley.   Skin: No rashes  Vascular Access: UE AV access +thrill and bruit.     LABS:  12-01    136  |  93<L>  |  81<H>  ----------------------------<  86  4.7   |  27  |  9.01<H>    Ca    9.0      01 Dec 2020 06:14  Phos  3.5     12-01  Mg     3.0     12-01      Creatinine Trend: 9.01 <--, 7.01 <--, 5.81 <--, 8.50 <--, 7.84 <--, 6.68 <--                        7.4    4.12  )-----------( 260      ( 01 Dec 2020 06:14 )             24.7     Urine Studies:      RADIOLOGY & ADDITIONAL STUDIES:

## 2020-12-01 NOTE — PROGRESS NOTE ADULT - REASON FOR ADMISSION
AMS

## 2020-12-01 NOTE — PROGRESS NOTE ADULT - ATTENDING COMMENTS
Yosi See MD  [facility]  Office [phone]  Ans Serv [phone] cell [phone]

## 2020-12-01 NOTE — PROGRESS NOTE ADULT - ASSESSMENT
75M PMHx ESRD on HD (TTS), emphysema, renal cancer (diagnosed 3 months ago) on sunitinib, HTN, HLD presented to ED with sepsis and AMS.  Renal following for ESRD Mx.     ESRD (end stage renal disease) on dialysis.   Maintenance HD schedule TTS  Electrolytes and volume status acceptable.               plan for routine HD today, low UF goals  renal restrictions in diet  dose all meds for ESRD    HTN, bp well controlled. stable. not on meds  Anemia in CKD- Hg <goal. inc epo 10>16k w/hd tiw. Hb now <7 transfuse 1unit prbc over 4hrs. clinically not in CHF  AMS -resolved. pt at baseline now  LLL PNA on CXR- afebrile. monitor off abx, as per ID. dose for ESRD.    labs, chart reviewed     75M PMHx ESRD on HD (TTS), emphysema, renal cancer (diagnosed 3 months ago) on sunitinib, HTN, HLD presented to ED with sepsis and AMS.  Renal following for ESRD Mx.     ESRD (end stage renal disease) on dialysis.   Maintenance HD schedule TTS  Electrolytes and volume status acceptable.               HD earlier today, uneventful. UF 0.9kg achieved.  PLan for next HD 12/3   renal restrictions in diet  dose all meds for ESRD    HTN, bp well controlled. stable. not on meds  Anemia in CKD- Hg <goal. inc epo 10>16k w/hd tiw. Hb now <7 transfuse 1unit prbc over 4hrs. clinically not in CHF  AMS -resolved. pt at baseline now  LLL PNA on CXR- afebrile. monitor off abx, as per ID. dose for ESRD.    labs, chart reviewed

## 2020-12-01 NOTE — PROGRESS NOTE ADULT - PROBLEM SELECTOR PLAN 8
DVT PPx: HSQ  Diet- renal replacement - change to dysphagia P swallow eval   Dispo: PT recommending home with home PT. D/w patient's sister Paolo on 11/30, patient not compliant with medical follow up and does not seem able to care for himself at home. Palliative care team and CM following for dispo planning, now now planned for LTC placement as pt wishes to cw HD making hospice not an option.   Patient now optimized for LTC placement.

## 2020-12-01 NOTE — PROGRESS NOTE ADULT - NUTRITIONAL ASSESSMENT
This patient has been assessed with a concern for Malnutrition and has been determined to have a diagnosis/diagnoses of Severe protein-calorie malnutrition.    This patient is being managed with:   Diet Regular-  For patients receiving Renal Replacement - No Protein Restr No Conc K No Conc Phos Low Sodium (RENAL)  Supplement Feeding Modality:  Oral  Nepro Cans or Servings Per Day:  1       Frequency:  Three Times a day  Entered: Nov 19 2020  3:22PM    
This patient has been assessed with a concern for Malnutrition and has been determined to have a diagnosis/diagnoses of Severe protein-calorie malnutrition.    This patient is being managed with:   Diet Dysphagia 1 Pureed-Nectar Consistency Fluid-  For patients receiving Renal Replacement - No Protein Restr No Conc K No Conc Phos Low Sodium (RENAL)  Entered: Nov 27 2020 12:00PM    
This patient has been assessed with a concern for Malnutrition and has been determined to have a diagnosis/diagnoses of Severe protein-calorie malnutrition.    This patient is being managed with:   Diet Dysphagia 2 Mechanical Soft-Nectar Consistency Fluid-  For patients receiving Renal Replacement - No Protein Restr No Conc K No Conc Phos Low Sodium (RENAL)  Supplement Feeding Modality:  Oral  Nepro Cans or Servings Per Day:  3       Frequency:  Daily  Entered: Nov 30 2020 11:06AM    
This patient has been assessed with a concern for Malnutrition and has been determined to have a diagnosis/diagnoses of Severe protein-calorie malnutrition.    This patient is being managed with:   Diet Dysphagia 2 Mechanical Soft-Nectar Consistency Fluid-  For patients receiving Renal Replacement - No Protein Restr No Conc K No Conc Phos Low Sodium (RENAL)  Supplement Feeding Modality:  Oral  Nepro Cans or Servings Per Day:  3       Frequency:  Daily  Entered: Nov 30 2020 11:06AM    
This patient has been assessed with a concern for Malnutrition and has been determined to have a diagnosis/diagnoses of Severe protein-calorie malnutrition.    This patient is being managed with:   Diet Dysphagia 3 Soft-Thin Liquids-  For patients receiving Renal Replacement - No Protein Restr No Conc K No Conc Phos Low Sodium (RENAL)  Supplement Feeding Modality:  Oral  Nepro Cans or Servings Per Day:  1       Frequency:  Three Times a day  Entered: Nov 27 2020  5:24PM    
This patient has been assessed with a concern for Malnutrition and has been determined to have a diagnosis/diagnoses of Severe protein-calorie malnutrition.    This patient is being managed with:   Diet Regular-  For patients receiving Renal Replacement - No Protein Restr No Conc K No Conc Phos Low Sodium (RENAL)  Supplement Feeding Modality:  Oral  Nepro Cans or Servings Per Day:  1       Frequency:  Three Times a day  Entered: Nov 19 2020  3:22PM    
This patient has been assessed with a concern for Malnutrition and has been determined to have a diagnosis/diagnoses of Severe protein-calorie malnutrition.    This patient is being managed with:   Diet Regular-  For patients receiving Renal Replacement - No Protein Restr No Conc K No Conc Phos Low Sodium (RENAL)  Supplement Feeding Modality:  Oral  Nepro Cans or Servings Per Day:  1       Frequency:  Three Times a day  Entered: Nov 19 2020  3:22PM    
This patient has been assessed with a concern for Malnutrition and has been determined to have a diagnosis/diagnoses of Severe protein-calorie malnutrition.    This patient is being managed with:   Diet Regular-  For patients receiving Renal Replacement - No Protein Restr No Conc K No Conc Phos Low Sodium (RENAL)  Entered: Nov 19 2020 11:57AM    
This patient has been assessed with a concern for Malnutrition and has been determined to have a diagnosis/diagnoses of Severe protein-calorie malnutrition.    This patient is being managed with:   Diet Regular-  For patients receiving Renal Replacement - No Protein Restr No Conc K No Conc Phos Low Sodium (RENAL)  Supplement Feeding Modality:  Oral  Nepro Cans or Servings Per Day:  1       Frequency:  Three Times a day  Entered: Nov 19 2020  3:22PM    
This patient has been assessed with a concern for Malnutrition and has been determined to have a diagnosis/diagnoses of Severe protein-calorie malnutrition.    This patient is being managed with:   Diet Dysphagia 2 Mechanical Soft-Nectar Consistency Fluid-  For patients receiving Renal Replacement - No Protein Restr No Conc K No Conc Phos Low Sodium (RENAL)  Supplement Feeding Modality:  Oral  Nepro Cans or Servings Per Day:  3       Frequency:  Daily  Entered: Nov 30 2020 11:06AM    
This patient has been assessed with a concern for Malnutrition and has been determined to have a diagnosis/diagnoses of Severe protein-calorie malnutrition.    This patient is being managed with:   Diet Dysphagia 2 Mechanical Soft-Nectar Consistency Fluid-  For patients receiving Renal Replacement - No Protein Restr No Conc K No Conc Phos Low Sodium (RENAL)  Supplement Feeding Modality:  Oral  Nepro Cans or Servings Per Day:  3       Frequency:  Daily  Entered: Nov 30 2020 11:06AM    
This patient has been assessed with a concern for Malnutrition and has been determined to have a diagnosis/diagnoses of Severe protein-calorie malnutrition.    This patient is being managed with:   Diet Regular-  For patients receiving Renal Replacement - No Protein Restr No Conc K No Conc Phos Low Sodium (RENAL)  Supplement Feeding Modality:  Oral  Nepro Cans or Servings Per Day:  1       Frequency:  Three Times a day  Entered: Nov 19 2020  3:22PM    
This patient has been assessed with a concern for Malnutrition and has been determined to have a diagnosis/diagnoses of Severe protein-calorie malnutrition.    This patient is being managed with:   Diet Dysphagia 3 Soft-Thin Liquids-  For patients receiving Renal Replacement - No Protein Restr No Conc K No Conc Phos Low Sodium (RENAL)  Supplement Feeding Modality:  Oral  Nepro Cans or Servings Per Day:  1       Frequency:  Three Times a day  Entered: Nov 27 2020  5:24PM    
This patient has been assessed with a concern for Malnutrition and has been determined to have a diagnosis/diagnoses of Severe protein-calorie malnutrition.    This patient is being managed with:   Diet Dysphagia 3 Soft-Thin Liquids-  For patients receiving Renal Replacement - No Protein Restr No Conc K No Conc Phos Low Sodium (RENAL)  Supplement Feeding Modality:  Oral  Nepro Cans or Servings Per Day:  1       Frequency:  Three Times a day  Entered: Nov 27 2020  5:24PM    
This patient has been assessed with a concern for Malnutrition and has been determined to have a diagnosis/diagnoses of Severe protein-calorie malnutrition.    This patient is being managed with:   Diet Regular-  For patients receiving Renal Replacement - No Protein Restr No Conc K No Conc Phos Low Sodium (RENAL)  Supplement Feeding Modality:  Oral  Nepro Cans or Servings Per Day:  1       Frequency:  Three Times a day  Entered: Nov 19 2020  3:22PM    

## 2021-03-29 NOTE — H&P PST ADULT - NSCAFFEAMTFREQ_GEN_ALL_CORE_SD
Occupational Therapy  Daily Treatment     Patient Name: Rafael Mccoy  Age:  74 y.o., Sex:  male  Medical Record #: [id number]  Today's Date: 3/29/2021     Precautions  Precautions: Fall Risk, Swallow Precautions ( See Comments)  Comments: legally blind    Assessment    Able to follow more direction today but still needing frequent cueing to do so. Able to wash face w/ [place] initiation. Remains limited by weakness, fatigue, impaired balance, impaired cognition.    Plan    Continue current treatment plan.    DC Equipment Recommendations: Unable to determine at this time  Discharge Recommendations: Recommend post-acute placement for additional occupational therapy services prior to discharge home    Subjective    Pleasant and cooperative     Objective       03/29/21 1331   Cognition    Cognition / Consciousness X   Speech/ Communication Delayed Responses   Level of Consciousness Confused   Ability To Follow Commands 1 Step  (50-75%)   Safety Awareness Impaired   New Learning Impaired   Attention Impaired   Initiation Impaired   Comments able to follow 1 step better today however still w/ difficulty sequencing more difficult tasks. more verbal today than previous sessions   Active ROM Upper Body   Active ROM Upper Body  X   Comments LUE more limited than RUE   Upper Body Muscle Tone   Upper Body Muscle Tone  X   Rt Upper Extremity Muscle Tone Rigidity   Lt Upper Extremity Muscle Tone Rigidity   Neuro-Muscular Treatments   Neuro-Muscular Treatments Weight Shift Left;Weight Shift Right;Anterior weight shift;Postural Facilitation;Tactile Cuing;Verbal Cuing;Sequencing   Balance   Sitting Balance (Static) Poor   Sitting Balance (Dynamic) Poor -   Standing Balance (Static) Poor -   Standing Balance (Dynamic) Trace +   Weight Shift Sitting Poor   Weight Shift Standing Poor   Comments w/ HHA, then FWW   Bed Mobility    Supine to Sit Maximal Assist   Scooting Maximal Assist   Activities of Daily Living   Grooming Minimal  Assist;Seated  (wash face)   Lower Body Dressing Maximal Assist   Functional Mobility   Sit to Stand Moderate Assist   Bed, Chair, Wheelchair Transfer Moderate Assist   Transfer Method Stand Pivot   Mobility EOB>Stand pivot to chair   Comments lateral lean left, w/ FWW, difficulty weight shifting   Patient / Family Goals   Patient / Family Goal #1 To go home   Goal #1 Outcome Progressing as expected   Short Term Goals   Short Term Goal # 1 Pt will demo LB dressing w/ SPV   Goal Outcome # 1 Progressing slower than expected   Short Term Goal # 2 Pt will demo standing grooming w/ SPV   Goal Outcome # 2 Progressing slower than expected   Short Term Goal # 3 Pt will demo toilet txf w/ SPV   Goal Outcome # 3 Progressing slower than expected          1-2 cups/cans per day

## 2021-08-17 NOTE — PHYSICAL THERAPY INITIAL EVALUATION ADULT - ADDITIONAL COMMENTS
patient reports he ambulates with rolling walker at baseline and is able to get to his HD appointments 3 x per week [Negative] : Heme/Lymph

## 2023-03-20 NOTE — DISCHARGE NOTE NURSING/CASE MANAGEMENT/SOCIAL WORK - NSCORESITESY/N_GEN_A_CORE_RD
Renal Medicine       Following for ESRD management  Dialysis last 02/18/23    Comfortable  No SOB  No O2 requirement       Dialysis 03/21/22          Recent Labs   Lab 03/20/23  0615   *   POTASSIUM 3.7   CHLORIDE 93*   CO2 30*   ANIONGAP 7   *   BUN 90.6*   CR 2.47*   GFRESTIMATED 29*   ABHIJIT 10.0           APRIL Varghese    [facility]  [phone]         No

## 2024-03-07 NOTE — PATIENT PROFILE ADULT - NSPROPASSIVESMOKEEXPOSURE_GEN_A_NUR
[Dear  ___] : Dear  [unfilled], [Consult Letter:] : I had the pleasure of evaluating your patient, [unfilled]. [Please see my note below.] : Please see my note below. [Consult Closing:] : Thank you very much for allowing me to participate in the care of this patient.  If you have any questions, please do not hesitate to contact me. [Sincerely,] : Sincerely, [FreeTextEntry2] : Dr. Valdes [FreeTextEntry3] : Santiago Gutierres MD, PhD\par  Chief, Division of Laryngology\par  Department of Otolaryngology\par  [facility]\par  Pediatric Otolaryngology, [facility]\par   of Otolaryngology\par  [facility]  No

## 2024-04-11 NOTE — CONSULT NOTE ADULT - PROBLEM/RECOMMENDATION-1
Patient called because he reconsidered taking a steroid based anti-inflammatory as discussed at his last visit on 4/8/24. Patient had declined this medication, but the Naproxen has not been very effective. Patient's preferre pharmacy, [facility] at [place] and [address] in [place], has been selected.    Please contact patient regarding prescription.    Thank you.   DISPLAY PLAN FREE TEXT

## 2024-07-27 NOTE — H&P PST ADULT - REASON FOR ADMISSION
Patient downgraded to Med Surg Tele, surgical team (primary) aware    CGI<=3 "I feel like I have to pee and only blood comes out, I am having prostate surgery"
